# Patient Record
Sex: MALE | Race: WHITE | NOT HISPANIC OR LATINO | Employment: FULL TIME | URBAN - METROPOLITAN AREA
[De-identification: names, ages, dates, MRNs, and addresses within clinical notes are randomized per-mention and may not be internally consistent; named-entity substitution may affect disease eponyms.]

---

## 2017-01-25 ENCOUNTER — MYC REFILL (OUTPATIENT)
Dept: FAMILY MEDICINE | Facility: CLINIC | Age: 37
End: 2017-01-25

## 2017-01-25 DIAGNOSIS — B00.1 COLD SORE: ICD-10-CM

## 2017-01-25 DIAGNOSIS — F40.10 SOCIAL PHOBIA: ICD-10-CM

## 2017-01-25 RX ORDER — LORAZEPAM 0.5 MG/1
TABLET ORAL
Qty: 90 TABLET | Refills: 0 | Status: SHIPPED | OUTPATIENT
Start: 2017-01-25 | End: 2017-04-18

## 2017-01-25 RX ORDER — VALACYCLOVIR HYDROCHLORIDE 1 G/1
TABLET, FILM COATED ORAL
Qty: 28 TABLET | Refills: 2 | Status: SHIPPED | OUTPATIENT
Start: 2017-01-25 | End: 2017-07-05

## 2017-01-25 NOTE — TELEPHONE ENCOUNTER
Message from MyChart:  Original authorizing provider: JEREMIE Martin Randy Mindy would like a refill of the following medications:  valACYclovir (VALTREX) 1000 mg tablet [Jackelyn Mahajan PA-C]  LORazepam (ATIVAN) 0.5 MG tablet [Jackelyn Mahajan PA-C]    Preferred pharmacy: [facility] [address]    Comment:        Last Written Prescription Date: 01/20/16-valacyclovir, 10/24/16-lorazepam  Last Fill Quantity: 28,90,  # refills: 2,0   Last Office Visit with [facility], [facility] or [facility] prescribing provider: 07/07/16                                         Next 5 appointments (look out 90 days)     Jan 31, 2017  1:30 PM   Office Visit with Jackelyn Mahajan PA-C   [facility]    [address] [place] [id number] [phone]

## 2017-01-31 ENCOUNTER — OFFICE VISIT (OUTPATIENT)
Dept: FAMILY MEDICINE | Facility: CLINIC | Age: 37
End: 2017-01-31
Payer: COMMERCIAL

## 2017-01-31 VITALS
WEIGHT: 224 LBS | DIASTOLIC BLOOD PRESSURE: 82 MMHG | BODY MASS INDEX: 28.57 KG/M2 | RESPIRATION RATE: 16 BRPM | TEMPERATURE: 97.6 F | SYSTOLIC BLOOD PRESSURE: 122 MMHG | HEART RATE: 88 BPM

## 2017-01-31 DIAGNOSIS — F40.10 SOCIAL PHOBIA: ICD-10-CM

## 2017-01-31 DIAGNOSIS — R79.89 LOW TSH LEVEL: ICD-10-CM

## 2017-01-31 DIAGNOSIS — G47.9 SLEEP DISORDER: ICD-10-CM

## 2017-01-31 DIAGNOSIS — F90.0 ADD (ATTENTION DEFICIT HYPERACTIVITY DISORDER, INATTENTIVE TYPE): Primary | ICD-10-CM

## 2017-01-31 DIAGNOSIS — F32.5 MAJOR DEPRESSION IN COMPLETE REMISSION (H): ICD-10-CM

## 2017-01-31 DIAGNOSIS — R61 GENERALIZED HYPERHIDROSIS: ICD-10-CM

## 2017-01-31 LAB — TSH SERPL DL<=0.005 MIU/L-ACNC: 0.47 MU/L (ref 0.4–4)

## 2017-01-31 PROCEDURE — 99213 OFFICE O/P EST LOW 20 MIN: CPT | Performed by: PHYSICIAN ASSISTANT

## 2017-01-31 PROCEDURE — 36415 COLL VENOUS BLD VENIPUNCTURE: CPT | Performed by: PHYSICIAN ASSISTANT

## 2017-01-31 PROCEDURE — 84443 ASSAY THYROID STIM HORMONE: CPT | Performed by: PHYSICIAN ASSISTANT

## 2017-01-31 RX ORDER — DEXTROAMPHETAMINE SACCHARATE, AMPHETAMINE ASPARTATE MONOHYDRATE, DEXTROAMPHETAMINE SULFATE AND AMPHETAMINE SULFATE 5; 5; 5; 5 MG/1; MG/1; MG/1; MG/1
20 CAPSULE, EXTENDED RELEASE ORAL DAILY
Qty: 30 CAPSULE | Refills: 0 | Status: SHIPPED | OUTPATIENT
Start: 2017-03-28 | End: 2017-06-16

## 2017-01-31 RX ORDER — DEXTROAMPHETAMINE SACCHARATE, AMPHETAMINE ASPARTATE MONOHYDRATE, DEXTROAMPHETAMINE SULFATE AND AMPHETAMINE SULFATE 5; 5; 5; 5 MG/1; MG/1; MG/1; MG/1
20 CAPSULE, EXTENDED RELEASE ORAL DAILY
Qty: 30 CAPSULE | Refills: 0 | Status: SHIPPED | OUTPATIENT
Start: 2017-02-28 | End: 2017-01-31

## 2017-01-31 RX ORDER — VENLAFAXINE HYDROCHLORIDE 75 MG/1
225 CAPSULE, EXTENDED RELEASE ORAL DAILY
Qty: 270 CAPSULE | Refills: 3 | Status: SHIPPED | OUTPATIENT
Start: 2017-01-31 | End: 2017-07-05

## 2017-01-31 RX ORDER — DEXTROAMPHETAMINE SACCHARATE, AMPHETAMINE ASPARTATE MONOHYDRATE, DEXTROAMPHETAMINE SULFATE AND AMPHETAMINE SULFATE 5; 5; 5; 5 MG/1; MG/1; MG/1; MG/1
20 CAPSULE, EXTENDED RELEASE ORAL DAILY
Qty: 30 CAPSULE | Refills: 0 | Status: SHIPPED | OUTPATIENT
Start: 2017-01-31 | End: 2017-01-31

## 2017-01-31 RX ORDER — TRAZODONE HYDROCHLORIDE 100 MG/1
100 TABLET ORAL AT BEDTIME
Qty: 90 TABLET | Refills: 3 | Status: SHIPPED | OUTPATIENT
Start: 2017-01-31 | End: 2017-07-05

## 2017-01-31 ASSESSMENT — PAIN SCALES - GENERAL: PAINLEVEL: NO PAIN (0)

## 2017-01-31 NOTE — NURSING NOTE
"Chief Complaint   Patient presents with     A.D.H.D     med check       Initial /82 mmHg  Pulse 88  Temp(Src) 97.6  F (36.4  C) (Tympanic)  Resp 16  Wt 224 lb (101.606 kg) Estimated body mass index is 28.57 kg/(m^2) as calculated from the following:    Height as of 7/7/16: 6' 2.25\" (1.886 m).    Weight as of this encounter: 224 lb (101.606 kg).  BP completed using cuff size: kyara Hernandez CMA (AAMA)   "

## 2017-01-31 NOTE — PROGRESS NOTES
SUBJECTIVE:                                                    Roberto Guillen is a 37 year old male who presents to clinic today for the following health issues:      Medication Followup of Adderall    Taking Medication as prescribed: yes    Side Effects:  None    Medication Helping Symptoms:  Patient would like to go back to the extended release cap -had been on 20 milligrams extended release.          Depression and Anxiety Follow-Up    Status since last visit: Doing very well-no concerns. Currently on Effexor 225 mg daily. Also uses Ativan sparingly for anxiety and for diagnosis of social phobia-recently had this refilled.  Would like to continue same dose of medication.      Other associated symptoms:None    Complicating factors:     Significant life event: No     Current substance abuse: None    PHQ-9 SCORE 5/28/2015 7/7/2016 1/31/2017   Total Score 5 - -   Total Score - 3 1     No flowsheet data found.     PHQ-9  English      PHQ-9   Any Language     GAD7       SLEEP DISORDER-  Using trazodone 100 mg at bedtime-sleep has improved dramatically since the use of this medication.    LOW TSH-  His worsening anxiety had run a TSH which was just slightly under the normal at 0.36. Have suggested we recheck that in a month and he had forgotten to come in. Denies any significant endocrinology issues with review of systems today. We'll recheck this today.    Problem list and histories reviewed & adjusted, as indicated.  Additional history: as documented    Problem list, Medication list, Allergies, and Medical/Social/Surgical histories reviewed in [facility] and updated as appropriate.    ROS:  Constitutional, HEENT, cardiovascular, pulmonary, gi and gu systems are negative, except as otherwise noted.    OBJECTIVE:                                                    /82 mmHg  Pulse 88  Temp(Src) 97.6  F (36.4  C) (Tympanic)  Resp 16  Wt 224 lb (101.606 kg)  Body mass index is 28.57 kg/(m^2).   GENERAL:  healthy, alert and no distress  No signs of self harming behaviours. Normal hygiene & dress. Eye contact normal. Speech/mentation normal.      Diagnostic Test Results:  TSH is pending.      ASSESSMENT:                                                       ADD (attention deficit hyperactivity disorder, inattentive type)  Sleep disorder  Social phobia  Major depression in complete remission (H)  Generalized hyperhidrosis  Low TSH level      PLAN:                                                        ICD-10-CM    1. ADD (attention deficit hyperactivity disorder, inattentive type) F90.0 amphetamine-dextroamphetamine (ADDERALL XR) 20 MG per 24 hr capsule     DISCONTINUED: amphetamine-dextroamphetamine (ADDERALL XR) 20 MG per 24 hr capsule     DISCONTINUED: amphetamine-dextroamphetamine (ADDERALL XR) 20 MG per 24 hr capsule   2. Sleep disorder G47.9 traZODone (DESYREL) 100 MG tablet     venlafaxine (EFFEXOR-XR) 75 MG 24 hr capsule   3. Social phobia F40.10 traZODone (DESYREL) 100 MG tablet     venlafaxine (EFFEXOR-XR) 75 MG 24 hr capsule   4. Major depression in complete remission (H) F32.5 traZODone (DESYREL) 100 MG tablet     venlafaxine (EFFEXOR-XR) 75 MG 24 hr capsule   5. Generalized hyperhidrosis R61 traZODone (DESYREL) 100 MG tablet     venlafaxine (EFFEXOR-XR) 75 MG 24 hr capsule   6. Low TSH level R94.6 traZODone (DESYREL) 100 MG tablet     venlafaxine (EFFEXOR-XR) 75 MG 24 hr capsule     TSH with free T4 reflex           Changed his current Adderall prescription from generic twice daily dosing to extended release 20 mg daily. 3 separate months of prescriptions written out including January, February, March. He will take these to his pharmacy. Refilled Effexor and trazodone as stable on these medications. Follow-up in 6 months, sooner if any concerns or worsening.    Jackelyn Mahajan PA-C  [facility]    GREATER THAN 50% OF TIME SPENT IN COUNSELING & CARE COORDINATION - TOTAL FACE TO FACE TIME   15 MINUTES.    Orders Placed This Encounter     TSH with free T4 reflex     DISCONTD: amphetamine-dextroamphetamine (ADDERALL XR) 20 MG per 24 hr capsule     traZODone (DESYREL) 100 MG tablet     venlafaxine (EFFEXOR-XR) 75 MG 24 hr capsule     DISCONTD: amphetamine-dextroamphetamine (ADDERALL XR) 20 MG per 24 hr capsule     amphetamine-dextroamphetamine (ADDERALL XR) 20 MG per 24 hr capsule       Chart documentation done in part with Dragon Voice recognition Software. Although reviewed after completion, some word and grammatical error may remain.  AVS given to patient upon discharge today.  Electronically signed by Jackelyn Mahajan PA-C  January 31, 2017  2:13 PM

## 2017-01-31 NOTE — PROGRESS NOTES
Quick Note:    Roberto - your TSH is normal, but on the lower side. I would like to keep a close eye on this - next time you come in we should recheck it.  ______

## 2017-02-01 ASSESSMENT — PATIENT HEALTH QUESTIONNAIRE - PHQ9: SUM OF ALL RESPONSES TO PHQ QUESTIONS 1-9: 1

## 2017-02-14 ENCOUNTER — TELEPHONE (OUTPATIENT)
Dept: FAMILY MEDICINE | Facility: CLINIC | Age: 37
End: 2017-02-14

## 2017-02-14 DIAGNOSIS — J32.9 SINUS INFECTION: Primary | ICD-10-CM

## 2017-02-14 NOTE — TELEPHONE ENCOUNTER
RN Sinusitis Treatment Protocol: ages 16 and up  Roberto Guillen, a 37 year old male, is having symptoms reviewed for possible sinus infection.    ASSESSMENT/PLAN:  1.  Antibiotic treatment is indicated as onset of symptoms have been more than 10 days.  Amoxicillin-clavulanate 875mg/125mg orally twice daily or 500mg/125mg three times daily for 5 to 7 days.  2.  Education: ibuprofen or acetaminophen as directed on the bottle, over the counter decongestant, Nasal saline rinse, (Neti-pot or a nasal saline spray is preferred to Afrin nasal spray), Afrin nasal spray no longer than 5 days.  3.  Follow-up: Contact provider's triage RN if symptoms do not improve after 3 days of antibiotic treatment or if symptoms return after antibiotic therapy is complete.   4.  Patient verbalized understanding of this plan and is agreeable.    SUBJECTIVE:  Symptoms: Facial pain or pressure over the sinus areas, especially if worse with position change or cough, Nasal discharge/purulent, Nasal congestion, Change in sense of smell or lack of smell and Post nasal drip.     In addition notes: None   Shortness of breath: NO  Onset of symptoms was 4 week(s) ago.    Treatment measures tried include Fluids, OTC meds, Rest and Vaporizer.   Course of illness is same.  Predisposing conditions include: None    Complicating Factors and Serious Symptoms:   Patient reports: NONE   Patient denies: Fever > 102.5  Orbital pain  Periorbital swelling or erythema  Severe facial swelling or erythema  Severe neck pain  This being the worse headache the patient has ever experienced  Vision changes  COPD (chronic obstructive pulmonary disease)    ALLERGIES:  No Known Allergies  OBJECTIVE:  Weight: 0 lbs 0 oz    Encounter handled by: Nurse Triage.     Josey Miller RN

## 2017-04-18 ENCOUNTER — MYC REFILL (OUTPATIENT)
Dept: FAMILY MEDICINE | Facility: CLINIC | Age: 37
End: 2017-04-18

## 2017-04-18 DIAGNOSIS — F40.10 SOCIAL PHOBIA: ICD-10-CM

## 2017-04-18 RX ORDER — LORAZEPAM 0.5 MG/1
TABLET ORAL
Qty: 90 TABLET | Refills: 0 | Status: SHIPPED | OUTPATIENT
Start: 2017-04-18 | End: 2017-07-05

## 2017-04-18 NOTE — TELEPHONE ENCOUNTER
Message from MyChart:  Original authorizing provider: JEREMIE Martin would like a refill of the following medications:  LORazepam (ATIVAN) 0.5 MG tablet [Jackelyn Mahajan PA-C]    Preferred pharmacy: [address]    Comment:        Last Written Prescription Date: 01/25/17  Last Fill Quantity: 90,  # refills: 0   Last Office Visit with [facility], [facility] or [facility] prescribing provider: 01/31/17

## 2017-06-16 ENCOUNTER — MYC REFILL (OUTPATIENT)
Dept: FAMILY MEDICINE | Facility: CLINIC | Age: 37
End: 2017-06-16

## 2017-06-16 DIAGNOSIS — F90.0 ADD (ATTENTION DEFICIT HYPERACTIVITY DISORDER, INATTENTIVE TYPE): ICD-10-CM

## 2017-06-16 NOTE — TELEPHONE ENCOUNTER
Message from [employer]:  Original authorizing provider: JEREMIE Martin would like a refill of the following medications:  amphetamine-dextroamphetamine (ADDERALL XR) 20 MG per 24 hr capsule [Jackelyn Mahajan PA-C]    Preferred pharmacy: [facility] [address]    Comment:  I can  the script whenever it's ready        Last Written Prescription Date: 03/28/17  Last Fill Quantity: 30,  # refills: 0   Last Office Visit with [facility], [facility] or [facility] prescribing provider: 01/31/17

## 2017-06-19 RX ORDER — DEXTROAMPHETAMINE SACCHARATE, AMPHETAMINE ASPARTATE MONOHYDRATE, DEXTROAMPHETAMINE SULFATE AND AMPHETAMINE SULFATE 5; 5; 5; 5 MG/1; MG/1; MG/1; MG/1
20 CAPSULE, EXTENDED RELEASE ORAL DAILY
Qty: 30 CAPSULE | Refills: 0 | Status: SHIPPED | OUTPATIENT
Start: 2017-06-19 | End: 2017-07-05

## 2017-07-05 ENCOUNTER — OFFICE VISIT (OUTPATIENT)
Dept: FAMILY MEDICINE | Facility: CLINIC | Age: 37
End: 2017-07-05
Payer: COMMERCIAL

## 2017-07-05 VITALS
HEIGHT: 75 IN | DIASTOLIC BLOOD PRESSURE: 88 MMHG | SYSTOLIC BLOOD PRESSURE: 138 MMHG | HEART RATE: 100 BPM | BODY MASS INDEX: 26.73 KG/M2 | TEMPERATURE: 98.2 F | RESPIRATION RATE: 18 BRPM | WEIGHT: 215 LBS | OXYGEN SATURATION: 96 %

## 2017-07-05 DIAGNOSIS — R79.89 LOW TSH LEVEL: ICD-10-CM

## 2017-07-05 DIAGNOSIS — Z13.6 CARDIOVASCULAR SCREENING; LDL GOAL LESS THAN 160: ICD-10-CM

## 2017-07-05 DIAGNOSIS — G47.9 SLEEP DISORDER: ICD-10-CM

## 2017-07-05 DIAGNOSIS — R61 GENERALIZED HYPERHIDROSIS: ICD-10-CM

## 2017-07-05 DIAGNOSIS — F32.5 MAJOR DEPRESSION IN COMPLETE REMISSION (H): ICD-10-CM

## 2017-07-05 DIAGNOSIS — Z00.00 ENCOUNTER FOR GENERAL ADULT MEDICAL EXAMINATION WITHOUT ABNORMAL FINDINGS: Primary | ICD-10-CM

## 2017-07-05 DIAGNOSIS — F33.0 MILD RECURRENT MAJOR DEPRESSION (H): ICD-10-CM

## 2017-07-05 DIAGNOSIS — F40.10 SOCIAL PHOBIA: ICD-10-CM

## 2017-07-05 DIAGNOSIS — B00.1 COLD SORE: ICD-10-CM

## 2017-07-05 DIAGNOSIS — F98.8 ATTENTION DEFICIT DISORDER, UNSPECIFIED HYPERACTIVITY PRESENCE: ICD-10-CM

## 2017-07-05 PROBLEM — Z86.19 HISTORY OF COLD SORES: Status: ACTIVE | Noted: 2017-07-05

## 2017-07-05 LAB — TSH SERPL DL<=0.005 MIU/L-ACNC: 0.84 MU/L (ref 0.4–4)

## 2017-07-05 PROCEDURE — 36415 COLL VENOUS BLD VENIPUNCTURE: CPT | Performed by: PHYSICIAN ASSISTANT

## 2017-07-05 PROCEDURE — 99395 PREV VISIT EST AGE 18-39: CPT | Performed by: PHYSICIAN ASSISTANT

## 2017-07-05 PROCEDURE — 84443 ASSAY THYROID STIM HORMONE: CPT | Performed by: PHYSICIAN ASSISTANT

## 2017-07-05 RX ORDER — TRAZODONE HYDROCHLORIDE 100 MG/1
100 TABLET ORAL AT BEDTIME
Qty: 90 TABLET | Refills: 3 | Status: SHIPPED | OUTPATIENT
Start: 2017-07-05 | End: 2018-01-30

## 2017-07-05 RX ORDER — DEXTROAMPHETAMINE SACCHARATE, AMPHETAMINE ASPARTATE MONOHYDRATE, DEXTROAMPHETAMINE SULFATE AND AMPHETAMINE SULFATE 5; 5; 5; 5 MG/1; MG/1; MG/1; MG/1
20 CAPSULE, EXTENDED RELEASE ORAL DAILY
Qty: 30 CAPSULE | Refills: 0 | Status: SHIPPED | OUTPATIENT
Start: 2017-07-17 | End: 2017-08-16

## 2017-07-05 RX ORDER — VALACYCLOVIR HYDROCHLORIDE 1 G/1
TABLET, FILM COATED ORAL
Qty: 28 TABLET | Refills: 2 | Status: SHIPPED | OUTPATIENT
Start: 2017-07-05 | End: 2018-08-09

## 2017-07-05 RX ORDER — LORAZEPAM 0.5 MG/1
TABLET ORAL
Qty: 90 TABLET | Refills: 0 | Status: SHIPPED | OUTPATIENT
Start: 2017-07-05 | End: 2017-11-08

## 2017-07-05 RX ORDER — VENLAFAXINE HYDROCHLORIDE 75 MG/1
225 CAPSULE, EXTENDED RELEASE ORAL DAILY
Qty: 270 CAPSULE | Refills: 3 | Status: SHIPPED | OUTPATIENT
Start: 2017-07-05 | End: 2018-01-30

## 2017-07-05 ASSESSMENT — PAIN SCALES - GENERAL: PAINLEVEL: NO PAIN (0)

## 2017-07-05 NOTE — MR AVS SNAPSHOT
After Visit Summary   7/5/2017    Roberto Guillen    MRN: [id number]           Patient Information     Date Of Birth          1980        Visit Information        Provider Department      7/5/2017 1:30 PM Jackelyn Mahajan PA-C [facility]        Today's Diagnoses     Social phobia    -  1    Mild recurrent major depression (H)        Sleep disorder        Major depression in complete remission (H)        Generalized hyperhidrosis        Low TSH level        Cold sore        Attention deficit disorder, unspecified hyperactivity presence          Care Instructions      Preventive Health Recommendations  Male Ages 26 - 39    Yearly exam:             See your health care provider every year in order to  o   Review health changes.   o   Discuss preventive care.    o   Review your medicines if your doctor has prescribed any.    You should be tested each year for STDs (sexually transmitted diseases), if you re at risk.     After age 35, talk to your provider about cholesterol testing. If you are at risk for heart disease, have your cholesterol tested at least every 5 years.     If you are at risk for diabetes, you should have a diabetes test (fasting glucose).  Shots: Get a flu shot each year. Get a tetanus shot every 10 years.     Nutrition:    Eat at least 5 servings of fruits and vegetables daily.     Eat whole-grain bread, whole-wheat pasta and brown rice instead of white grains and rice.     Talk to your provider about Calcium and Vitamin D.     Lifestyle    Exercise for at least 150 minutes a week (30 minutes a day, 5 days a week). This will help you control your weight and prevent disease.     Limit alcohol to one drink per day.     No smoking.     Wear sunscreen to prevent skin cancer.     See your dentist every six months for an exam and cleaning.             Follow-ups after your visit        Who to contact     If you have questions or need follow up information about  "today's clinic visit or your schedule please contact [facility] directly at [phone].  Normal or non-critical lab and imaging results will be communicated to you by MyChart, letter or phone within 4 business days after the clinic has received the results. If you do not hear from us within 7 days, please contact the clinic through [employer] or phone. If you have a critical or abnormal lab result, we will notify you by phone as soon as possible.  Submit refill requests through [employer] or call your pharmacy and they will forward the refill request to us. Please allow 3 business days for your refill to be completed.          Additional Information About Your Visit        [employer] Information     [employer] gives you secure access to your electronic health record. If you see a primary care provider, you can also send messages to your care team and make appointments. If you have questions, please call your primary care clinic.  If you do not have a primary care provider, please call [phone] and they will assist you.        Care EveryWhere ID     This is your Care EveryWhere ID. This could be used by other organizations to access your [place] medical records  [id number]        Your Vitals Were     Pulse Temperature Respirations Height Pulse Oximetry BMI (Body Mass Index)    100 98.2  F (36.8  C) (Tympanic) 18 6' 3.4\" (1.915 m) 96% 26.59 kg/m2       Blood Pressure from Last 3 Encounters:   07/05/17 (!) 142/100   01/31/17 122/82   07/07/16 128/80    Weight from Last 3 Encounters:   07/05/17 215 lb (97.5 kg)   01/31/17 224 lb (101.6 kg)   07/07/16 211 lb (95.7 kg)              We Performed the Following     DEPRESSION ACTION PLAN (DAP)     TSH with free T4 reflex          Where to get your medicines      These medications were sent to [facility] [address] [address]     Phone:  [phone]     traZODone 100 MG tablet    " valACYclovir 1000 mg tablet    venlafaxine 75 MG 24 hr capsule         Some of these will need a paper prescription and others can be bought over the counter.  Ask your nurse if you have questions.     Bring a paper prescription for each of these medications     amphetamine-dextroamphetamine 20 MG per 24 hr capsule    LORazepam 0.5 MG tablet          Primary Care Provider Office Phone # Fax #    Jackelyn S JEREMIE Mahajan [phone]       [address] [place] [address]        Equal Access to Services     ANNETTE BARRETT : Hadii aad ku hadasho Soomaali, waaxda luqadaha, qaybta kaalmada adeegyada, waxay idiin hayaan adeeg kholivia lui . So [facility] [phone].    ATENCIÓN: Si habla español, tiene a otto disposición servicios gratuitos de asistencia lingüística. [facility] [phone].    We comply with applicable federal civil rights laws and [place] laws. We do not discriminate on the basis of race, color, national origin, age, disability sex, sexual orientation or gender identity.            Thank you!     Thank you for choosing [facility]  for your care. Our goal is always to provide you with excellent care. Hearing back from our patients is one way we can continue to improve our services. Please take a few minutes to complete the written survey that you may receive in the mail after your visit with us. Thank you!             Your Updated Medication List - Protect others around you: Learn how to safely use, store and throw away your medicines at www.disposemymeds.org.          This list is accurate as of: 7/5/17  2:05 PM.  Always use your most recent med list.                   Brand Name Dispense Instructions for use Diagnosis    amphetamine-dextroamphetamine 20 MG per 24 hr capsule   Start taking on:  7/17/2017    ADDERALL XR    30 capsule    Take 1 capsule (20 mg) by mouth daily    Attention deficit disorder, unspecified hyperactivity presence        LORazepam 0.5 MG tablet    ATIVAN    90 tablet    ONE - TWO EVERY 6 HOURS, AS NEEDED FOR ANXIETY.    Social phobia       traZODone 100 MG tablet    DESYREL    90 tablet    Take 1 tablet (100 mg) by mouth At Bedtime for sleep    Sleep disorder, Social phobia, Major depression in complete remission (H), Generalized hyperhidrosis, Low TSH level       valACYclovir 1000 mg tablet    VALTREX    28 tablet    Take  by mouth. 2 Tabs bid x1 day for cold sore outbreak. 4 tablets for treatment.    Cold sore       venlafaxine 75 MG 24 hr capsule    EFFEXOR-XR    270 capsule    Take 3 capsules (225 mg) by mouth daily    Social phobia, Major depression in complete remission (H), Sleep disorder, Generalized hyperhidrosis, Low TSH level

## 2017-07-05 NOTE — PROGRESS NOTES
SUBJECTIVE:   CC: Roberto Guillen is an 37 year old male who presents for preventative health visit.     Healthy Habits:    Do you get at least three servings of calcium containing foods daily (dairy, green leafy vegetables, etc.)? yes    Amount of exercise or daily activities, outside of work: 5 day(s) per week    Problems taking medications regularly No    Medication side effects: No    Have you had an eye exam in the past two years? no    Do you see a dentist twice per year? yes    Do you have sleep apnea, excessive snoring or daytime drowsiness?no        Depression/Anxiety Followup    Status since last visit: Stable - has been on the same medication management long-term including Effexor to 25 mg daily and Ativan sparingly. Uses trazodone for sleep.    See PHQ-9 for current symptoms.  Other associated symptoms: None    Complicating factors:   Significant life event:  No   Current substance abuse:  None  Anxiety or Panic symptoms:  Yes-  Anxiety        PHQ-9  English  PHQ-9   Any Language    Recheck ADHD - has been on Adderall extended release 20 mg daily for the past few few years after being diagnosed by Dr. Izquierdo with ADD. Is here today also for 6 month follow-up.    Also needs follow-up of his thyroid-have been watching this carefully as he did have one low TSH level, but follow-ups have prove normal levels.    Today's PHQ-2 Score:   PHQ-2 ( 1999 Pfizer) 7/5/2017   Q1: Little interest or pleasure in doing things 0   Q2: Feeling down, depressed or hopeless 0   PHQ-2 Score 0       Abuse: Current or Past(Physical, Sexual or Emotional)- No  Do you feel safe in your environment - Yes    Social History   Substance Use Topics     Smoking status: Former Smoker     Packs/day: 0.50     Quit date: 1/1/2013     Smokeless tobacco: Never Used     Alcohol use No      Comment: treatment in 2006     The patient does not drink >3 drinks per day nor >7 drinks per week.    Last PSA: No results found for:  "PSA    Reviewed orders with patient. Reviewed health maintenance and updated orders accordingly - Yes  Patient Active Problem List   Diagnosis     Social phobia     Sleep disorder     CARDIOVASCULAR SCREENING; LDL GOAL LESS THAN 160     ADD (attention deficit hyperactivity disorder, inattentive type)     History of cold sores     Mild recurrent major depression (H)     Past Surgical History:   Procedure Laterality Date     TONSILLECTOMY  1990       Social History   Substance Use Topics     Smoking status: Former Smoker     Packs/day: 0.50     Quit date: 1/1/2013     Smokeless tobacco: Never Used     Alcohol use No      Comment: treatment in 2006     Family History   Problem Relation Age of Onset     Depression Sister      Depression Mother      DIABETES Mother      borderline     Hypertension Mother      Prostate Cancer Father 60     Lipids Father      Psychotic Disorder Maternal Uncle      suicide     Cancer - colorectal No family hx of            Reviewed and updated as needed this visit by clinical staff  Tobacco  Allergies  Meds         Reviewed and updated as needed this visit by Provider            ROS:  C: NEGATIVE for fever, chills, change in weight  I: NEGATIVE for worrisome rashes, moles or lesions  E: NEGATIVE for vision changes or irritation  ENT: NEGATIVE for ear, mouth and throat problems  R: NEGATIVE for significant cough or SOB  CV: NEGATIVE for chest pain, palpitations or peripheral edema  GI: NEGATIVE for nausea, abdominal pain, heartburn, or change in bowel habits   male: negative for dysuria, hematuria, decreased urinary stream, erectile dysfunction, urethral discharge  M: NEGATIVE for significant arthralgias or myalgia  N: NEGATIVE for weakness, dizziness or paresthesias  P: NEGATIVE for changes in mood or affect    OBJECTIVE:   /88  Pulse 100  Temp 98.2  F (36.8  C) (Tympanic)  Resp 18  Ht 6' 3.4\" (1.915 m)  Wt 215 lb (97.5 kg)  SpO2 96%  BMI 26.59 kg/m2  EXAM:  GENERAL: " healthy, alert and no distress  EYES: Eyes grossly normal to inspection, PERRL and conjunctivae and sclerae normal  HENT: ear canals and TM's normal, nose and mouth without ulcers or lesions  NECK: no adenopathy, no asymmetry, masses, or scars and thyroid normal to palpation  RESP: lungs clear to auscultation - no rales, rhonchi or wheezes  CV: regular rate and rhythm, normal S1 S2, no S3 or S4, no murmur, click or rub, no peripheral edema and peripheral pulses strong  ABDOMEN: soft, nontender, no hepatosplenomegaly, no masses and bowel sounds normal   (male): declined  MS: no gross musculoskeletal defects noted, no edema  SKIN: no suspicious lesions or rashes  NEURO: Normal strength and tone, mentation intact and speech normal  PSYCH: mentation appears normal, affect normal/bright    ASSESSMENT/PLAN:       ICD-10-CM    1. Encounter for general adult medical examination without abnormal findings Z00.00    2. Social phobia F40.10 LORazepam (ATIVAN) 0.5 MG tablet     traZODone (DESYREL) 100 MG tablet     venlafaxine (EFFEXOR-XR) 75 MG 24 hr capsule   3. Mild recurrent major depression (H) F33.0    4. Sleep disorder G47.9 traZODone (DESYREL) 100 MG tablet     venlafaxine (EFFEXOR-XR) 75 MG 24 hr capsule   5. Major depression in complete remission (H) F32.5 traZODone (DESYREL) 100 MG tablet     venlafaxine (EFFEXOR-XR) 75 MG 24 hr capsule   6. Generalized hyperhidrosis R61 traZODone (DESYREL) 100 MG tablet     venlafaxine (EFFEXOR-XR) 75 MG 24 hr capsule   7. Low TSH level R94.6 traZODone (DESYREL) 100 MG tablet     venlafaxine (EFFEXOR-XR) 75 MG 24 hr capsule     TSH with free T4 reflex   8. Cold sore-history of B00.1 valACYclovir (VALTREX) 1000 mg tablet   9. Attention deficit disorder, unspecified hyperactivity presence F98.8 amphetamine-dextroamphetamine (ADDERALL XR) 20 MG per 24 hr capsule   10. CARDIOVASCULAR SCREENING; LDL GOAL LESS THAN 160 Z13.6 Lipid Profile (Chol, Trig, HDL, LDL calc)  "      COUNSELING:  Reviewed preventive health counseling, as reflected in patient instructions       Regular exercise       Healthy diet/nutrition       Vision screening    BP Screening:   Last 3 BP Readings:    BP Readings from Last 3 Encounters:   07/05/17 138/88   01/31/17 122/82   07/07/16 128/80       The following was recommended to the patient:  Re-screen BP within a year and recommended lifestyle modifications     reports that he quit smoking about 4 years ago. He smoked 0.50 packs per day. He has never used smokeless tobacco.    Estimated body mass index is 26.59 kg/(m^2) as calculated from the following:    Height as of this encounter: 6' 3.4\" (1.915 m).    Weight as of this encounter: 215 lb (97.5 kg).     Refilled all medications-she has been on these long-term and is stable. We'll see him back in 6 months for follow-up of the ADD and refill of the medication or this diagnoses. TSH and lipid panel were done today-we'll call him with results as they return. His blood pressure was slightly elevated at first check, but did come down to a normal level at the end of the visit. Still high-normal. Would like him to continue monitoring this carefully. 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 He will keep me informed particularly if elevating.      Counseling Resources:  ATP IV Guidelines  Pooled Cohorts Equation Calculator  FRAX Risk Assessment  ICSI Preventive Guidelines  Dietary Guidelines for Americans, 2010  USDA's MyPlate  ASA Prophylaxis  Lung CA Screening    Jackelyn Mahajan PA-C  [facility] Placed This Encounter     DEPRESSION ACTION PLAN (DAP)     TSH with free T4 reflex     Lipid Profile (Chol, Trig, HDL, LDL calc)     LORazepam (ATIVAN) 0.5 MG tablet     traZODone (DESYREL) 100 MG tablet     venlafaxine (EFFEXOR-XR) 75 MG 24 hr capsule     valACYclovir (VALTREX) 1000 mg tablet     amphetamine-dextroamphetamine (ADDERALL XR) 20 MG per 24 hr " capsule       AVS given to patient upon discharge today.  Electronically signed by Jackelyn Mahajan PA-C  July 12, 2017  10:36 AM

## 2017-07-05 NOTE — PROGRESS NOTES
Roberto - your lab looks fine - no concern - I'd like to continue checking this yearly with your physicals.

## 2017-07-05 NOTE — NURSING NOTE
"Chief Complaint   Patient presents with     Physical       Initial BP (!) 142/100  Pulse 100  Temp 98.2  F (36.8  C) (Tympanic)  Resp 18  Ht 6' 3.4\" (1.915 m)  Wt 215 lb (97.5 kg)  SpO2 96%  BMI 26.59 kg/m2 Estimated body mass index is 26.59 kg/(m^2) as calculated from the following:    Height as of this encounter: 6' 3.4\" (1.915 m).    Weight as of this encounter: 215 lb (97.5 kg).  BP completed using cuff size: carly Almaraz MA      "

## 2017-07-05 NOTE — LETTER
My Depression Action Plan  Name: Roberto Guillen   Date of Birth 1980  Date: 7/5/2017    My doctor: Jackelyn Mahajan   My clinic: [address] [address] [phone]          GREEN    ZONE   Good Control    What it looks like:     Things are going generally well. You have normal up s and down s. You may even feel depressed from time to time, but bad moods usually last less than a day.   What you need to do:  1. Continue to care for yourself (see self care plan)  2. Check your depression survival kit and update it as needed  3. Follow your physician s recommendations including any medication.  4. Do not stop taking medication unless you consult with your physician first.           YELLOW         ZONE Getting Worse    What it looks like:     Depression is starting to interfere with your life.     It may be hard to get out of bed; you may be starting to isolate yourself from others.    Symptoms of depression are starting to last most all day and this has happened for several days.     You may have suicidal thoughts but they are not constant.   What you need to do:     1. Call your care team, your response to treatment will improve if you keep your care team informed of your progress. Yellow periods are signs an adjustment may need to be made.     2. Continue your self-care, even if you have to fake it!    3. Talk to someone in your support network    4. Open up your depression survival kit           RED    ZONE Medical Alert - Get Help    What it looks like:     Depression is seriously interfering with your life.     You may experience these or other symptoms: You can t get out of bed most days, can t work or engage in other necessary activities, you have trouble taking care of basic hygiene, or basic responsibilities, thoughts of suicide or death that will not go away, self-injurious behavior.     What you need to do:  1. Call your care team  and request a same-day appointment. If they are not available (weekends or after hours) call your local crisis line, emergency room or 911.      Electronically signed by: Suyapa Almaraz, July 5, 2017    Depression Self Care Plan / Survival Kit    Self-Care for Depression  Here s the deal. Your body and mind are really not as separate as most people think.  What you do and think affects how you feel and how you feel influences what you do and think. This means if you do things that people who feel good do, it will help you feel better.  Sometimes this is all it takes.  There is also a place for medication and therapy depending on how severe your depression is, so be sure to consult with your medical provider and/ or Behavioral Health Consultant if your symptoms are worsening or not improving.     In order to better manage my stress, I will:    Exercise  Get some form of exercise, every day. This will help reduce pain and release endorphins, the  feel good  chemicals in your brain. This is almost as good as taking antidepressants!  This is not the same as joining a gym and then never going! (they count on that by the way ) It can be as simple as just going for a walk or doing some gardening, anything that will get you moving.      Hygiene   Maintain good hygiene (Get out of bed in the morning, Make your bed, Brush your teeth, Take a shower, and Get dressed like you were going to work, even if you are unemployed).  If your clothes don't fit try to get ones that do.    Diet  I will strive to eat foods that are good for me, drink plenty of water, and avoid excessive sugar, caffeine, alcohol, and other mood-altering substances.  Some foods that are helpful in depression are: complex carbohydrates, B vitamins, flaxseed, fish or fish oil, fresh fruits and vegetables.    Psychotherapy  I agree to participate in Individual Therapy (if recommended).    Medication  If prescribed medications, I agree to take them.  Missing doses  can result in serious side effects.  I understand that drinking alcohol, or other illicit drug use, may cause potential side effects.  I will not stop my medication abruptly without first discussing it with my provider.    Staying Connected With Others  I will stay in touch with my friends, family members, and my primary care provider/team.    Use your imagination  Be creative.  We all have a creative side; it doesn t matter if it s oil painting, sand castles, or mud pies! This will also kick up the endorphins.    Witness Beauty  (AKA stop and smell the roses) Take a look outside, even in mid-winter. Notice colors, textures. Watch the squirrels and birds.     Service to others  Be of service to others.  There is always someone else in need.  By helping others we can  get out of ourselves  and remember the really important things.  This also provides opportunities for practicing all the other parts of the program.    Humor  Laugh and be silly!  Adjust your TV habits for less news and crime-drama and more comedy.    Control your stress  Try breathing deep, massage therapy, biofeedback, and meditation. Find time to relax each day.     My support system    Clinic Contact:  Phone number:    Contact 1:  Phone number:    Contact 2:  Phone number:    Taoist/:  Phone number:    Therapist:  Phone number:    Local crisis center:    Phone number:    Other community support:  Phone number:

## 2017-07-06 ASSESSMENT — PATIENT HEALTH QUESTIONNAIRE - PHQ9: SUM OF ALL RESPONSES TO PHQ QUESTIONS 1-9: 2

## 2017-08-16 ENCOUNTER — MYC REFILL (OUTPATIENT)
Dept: FAMILY MEDICINE | Facility: CLINIC | Age: 37
End: 2017-08-16

## 2017-08-16 DIAGNOSIS — F98.8 ATTENTION DEFICIT DISORDER, UNSPECIFIED HYPERACTIVITY PRESENCE: ICD-10-CM

## 2017-08-16 RX ORDER — DEXTROAMPHETAMINE SACCHARATE, AMPHETAMINE ASPARTATE MONOHYDRATE, DEXTROAMPHETAMINE SULFATE AND AMPHETAMINE SULFATE 5; 5; 5; 5 MG/1; MG/1; MG/1; MG/1
20 CAPSULE, EXTENDED RELEASE ORAL DAILY
Qty: 30 CAPSULE | Refills: 0 | Status: SHIPPED | OUTPATIENT
Start: 2017-08-16 | End: 2017-10-23

## 2017-08-16 NOTE — TELEPHONE ENCOUNTER
Message from [employer]:  Original authorizing provider: JEREMIE Martin would like a refill of the following medications:  amphetamine-dextroamphetamine (ADDERALL XR) 20 MG per 24 hr capsule [Jackelyn Mahajan PA-C]    Preferred pharmacy: [facility] [address]    Comment:  You can give Rx to Bailee Narvaez if it's before the meeting today.. otherwise I can         Last Written Prescription Date: 07/17/17  Last Fill Quantity: 30,  # refills: 0   Last Office Visit with G, P or [facility] prescribing provider: 07/05/17

## 2017-08-16 NOTE — TELEPHONE ENCOUNTER
Hard copy Rx placed at  at [facility] to be picked up by patient.  ................Misha Friend LPN,   August 16, 2017,      1:02 PM,   [facility]

## 2017-10-23 ENCOUNTER — MYC REFILL (OUTPATIENT)
Dept: FAMILY MEDICINE | Facility: CLINIC | Age: 37
End: 2017-10-23

## 2017-10-23 DIAGNOSIS — F98.8 ATTENTION DEFICIT DISORDER, UNSPECIFIED HYPERACTIVITY PRESENCE: ICD-10-CM

## 2017-10-23 RX ORDER — DEXTROAMPHETAMINE SACCHARATE, AMPHETAMINE ASPARTATE MONOHYDRATE, DEXTROAMPHETAMINE SULFATE AND AMPHETAMINE SULFATE 5; 5; 5; 5 MG/1; MG/1; MG/1; MG/1
20 CAPSULE, EXTENDED RELEASE ORAL DAILY
Qty: 30 CAPSULE | Refills: 0 | Status: SHIPPED | OUTPATIENT
Start: 2017-11-20 | End: 2017-10-23

## 2017-10-23 RX ORDER — DEXTROAMPHETAMINE SACCHARATE, AMPHETAMINE ASPARTATE MONOHYDRATE, DEXTROAMPHETAMINE SULFATE AND AMPHETAMINE SULFATE 5; 5; 5; 5 MG/1; MG/1; MG/1; MG/1
20 CAPSULE, EXTENDED RELEASE ORAL DAILY
Qty: 30 CAPSULE | Refills: 0 | Status: SHIPPED | OUTPATIENT
Start: 2017-12-18 | End: 2018-01-30

## 2017-10-23 RX ORDER — DEXTROAMPHETAMINE SACCHARATE, AMPHETAMINE ASPARTATE MONOHYDRATE, DEXTROAMPHETAMINE SULFATE AND AMPHETAMINE SULFATE 5; 5; 5; 5 MG/1; MG/1; MG/1; MG/1
20 CAPSULE, EXTENDED RELEASE ORAL DAILY
Qty: 30 CAPSULE | Refills: 0 | Status: SHIPPED | OUTPATIENT
Start: 2017-10-23 | End: 2017-10-23

## 2017-10-23 NOTE — TELEPHONE ENCOUNTER
Message from [employer]:  Original authorizing provider: JEREMIE Martin would like a refill of the following medications:  amphetamine-dextroamphetamine (ADDERALL XR) 20 MG per 24 hr capsule [Jackelyn Mahajan PA-C]    Preferred pharmacy: Other - [facility] [place]    Comment:  Script can be mailed

## 2017-11-08 ENCOUNTER — MYC REFILL (OUTPATIENT)
Dept: FAMILY MEDICINE | Facility: CLINIC | Age: 37
End: 2017-11-08

## 2017-11-08 DIAGNOSIS — F40.10 SOCIAL PHOBIA: ICD-10-CM

## 2017-11-08 RX ORDER — LORAZEPAM 0.5 MG/1
TABLET ORAL
Qty: 90 TABLET | Refills: 0 | Status: SHIPPED | OUTPATIENT
Start: 2017-11-08 | End: 2018-01-30

## 2017-11-08 NOTE — TELEPHONE ENCOUNTER
Message from [employer]:  Original authorizing provider: JEREMIE Martin would like a refill of the following medications:  LORazepam (ATIVAN) 0.5 MG tablet [Jackelyn Mahajan PA-C]    Preferred pharmacy: [facility] [address]    Comment:

## 2018-01-30 ENCOUNTER — OFFICE VISIT (OUTPATIENT)
Dept: FAMILY MEDICINE | Facility: CLINIC | Age: 38
End: 2018-01-30
Payer: COMMERCIAL

## 2018-01-30 VITALS
HEIGHT: 75 IN | SYSTOLIC BLOOD PRESSURE: 138 MMHG | DIASTOLIC BLOOD PRESSURE: 98 MMHG | WEIGHT: 222 LBS | TEMPERATURE: 97.7 F | HEART RATE: 102 BPM | BODY MASS INDEX: 27.6 KG/M2 | RESPIRATION RATE: 16 BRPM | OXYGEN SATURATION: 96 %

## 2018-01-30 DIAGNOSIS — G47.9 SLEEP DISORDER: ICD-10-CM

## 2018-01-30 DIAGNOSIS — F98.8 ATTENTION DEFICIT DISORDER, UNSPECIFIED HYPERACTIVITY PRESENCE: ICD-10-CM

## 2018-01-30 DIAGNOSIS — F40.10 SOCIAL PHOBIA: ICD-10-CM

## 2018-01-30 DIAGNOSIS — R03.0 ELEVATED BLOOD PRESSURE READING WITHOUT DIAGNOSIS OF HYPERTENSION: ICD-10-CM

## 2018-01-30 DIAGNOSIS — F33.0 MILD RECURRENT MAJOR DEPRESSION (H): Primary | ICD-10-CM

## 2018-01-30 PROCEDURE — 99213 OFFICE O/P EST LOW 20 MIN: CPT | Performed by: PHYSICIAN ASSISTANT

## 2018-01-30 RX ORDER — TRAZODONE HYDROCHLORIDE 100 MG/1
100 TABLET ORAL AT BEDTIME
Qty: 90 TABLET | Refills: 3 | Status: SHIPPED | OUTPATIENT
Start: 2018-01-30 | End: 2018-08-09

## 2018-01-30 RX ORDER — DEXTROAMPHETAMINE SACCHARATE, AMPHETAMINE ASPARTATE MONOHYDRATE, DEXTROAMPHETAMINE SULFATE AND AMPHETAMINE SULFATE 5; 5; 5; 5 MG/1; MG/1; MG/1; MG/1
20 CAPSULE, EXTENDED RELEASE ORAL DAILY
Qty: 30 CAPSULE | Refills: 0 | Status: SHIPPED | OUTPATIENT
Start: 2018-03-27 | End: 2018-08-09

## 2018-01-30 RX ORDER — DEXTROAMPHETAMINE SACCHARATE, AMPHETAMINE ASPARTATE MONOHYDRATE, DEXTROAMPHETAMINE SULFATE AND AMPHETAMINE SULFATE 5; 5; 5; 5 MG/1; MG/1; MG/1; MG/1
20 CAPSULE, EXTENDED RELEASE ORAL DAILY
Qty: 30 CAPSULE | Refills: 0 | Status: SHIPPED | OUTPATIENT
Start: 2018-01-30 | End: 2018-01-30

## 2018-01-30 RX ORDER — DEXTROAMPHETAMINE SACCHARATE, AMPHETAMINE ASPARTATE MONOHYDRATE, DEXTROAMPHETAMINE SULFATE AND AMPHETAMINE SULFATE 5; 5; 5; 5 MG/1; MG/1; MG/1; MG/1
20 CAPSULE, EXTENDED RELEASE ORAL DAILY
Qty: 30 CAPSULE | Refills: 0 | Status: SHIPPED | OUTPATIENT
Start: 2018-02-27 | End: 2018-01-30

## 2018-01-30 RX ORDER — VENLAFAXINE HYDROCHLORIDE 75 MG/1
225 CAPSULE, EXTENDED RELEASE ORAL DAILY
Qty: 270 CAPSULE | Refills: 1 | Status: SHIPPED | OUTPATIENT
Start: 2018-01-30 | End: 2018-06-29

## 2018-01-30 RX ORDER — LORAZEPAM 0.5 MG/1
TABLET ORAL
Qty: 90 TABLET | Refills: 0 | Status: SHIPPED | OUTPATIENT
Start: 2018-01-31 | End: 2018-05-02

## 2018-01-30 RX ORDER — VENLAFAXINE 37.5 MG/1
TABLET ORAL
Qty: 60 TABLET | Refills: 0 | Status: SHIPPED | OUTPATIENT
Start: 2018-01-30 | End: 2022-01-12

## 2018-01-30 ASSESSMENT — PAIN SCALES - GENERAL: PAINLEVEL: NO PAIN (0)

## 2018-01-30 NOTE — MR AVS SNAPSHOT
After Visit Summary   1/30/2018    Roberto Guillen    MRN: [id number]           Patient Information     Date Of Birth          1980        Visit Information        Provider Department      1/30/2018 2:00 PM Jackelyn Mahajan PA-C [facility]        Today's Diagnoses     Mild recurrent major depression (H)    -  1    Social phobia        Attention deficit disorder, unspecified hyperactivity presence        Sleep disorder        Elevated blood pressure reading without diagnosis of hypertension           Follow-ups after your visit        Who to contact     If you have questions or need follow up information about today's clinic visit or your schedule please contact [facility] directly at [phone].  Normal or non-critical lab and imaging results will be communicated to you by MyChart, letter or phone within 4 business days after the clinic has received the results. If you do not hear from us within 7 days, please contact the clinic through [employer] or phone. If you have a critical or abnormal lab result, we will notify you by phone as soon as possible.  Submit refill requests through [employer] or call your pharmacy and they will forward the refill request to us. Please allow 3 business days for your refill to be completed.          Additional Information About Your Visit        MyChart Information     [employer] gives you secure access to your electronic health record. If you see a primary care provider, you can also send messages to your care team and make appointments. If you have questions, please call your primary care clinic.  If you do not have a primary care provider, please call [phone] and they will assist you.        Care EveryWhere ID     This is your Care EveryWhere ID. This could be used by other organizations to access your [place] medical records  [id number]        Your Vitals Were     Pulse Temperature Respirations Height Pulse Oximetry  "BMI (Body Mass Index)    102 97.7  F (36.5  C) (Tympanic) 16 6' 2.5\" (1.892 m) 96% 28.12 kg/m2       Blood Pressure from Last 3 Encounters:   01/30/18 (!) 138/98   07/05/17 138/88   01/31/17 122/82    Weight from Last 3 Encounters:   01/30/18 222 lb (100.7 kg)   07/05/17 215 lb (97.5 kg)   01/31/17 224 lb (101.6 kg)              Today, you had the following     No orders found for display         Today's Medication Changes          These changes are accurate as of 1/30/18 11:59 PM.  If you have any questions, ask your nurse or doctor.               Start taking these medicines.        Dose/Directions    amphetamine-dextroamphetamine 20 MG per 24 hr capsule   Commonly known as:  ADDERALL XR   Used for:  Attention deficit disorder, unspecified hyperactivity presence   Started by:  Jackelyn Mahajan PA-C        Dose:  20 mg   Start taking on:  3/27/2018   Take 1 capsule (20 mg) by mouth daily   Quantity:  30 capsule   Refills:  0         These medicines have changed or have updated prescriptions.        Dose/Directions    * venlafaxine 37.5 MG tablet   Commonly known as:  EFFEXOR   This may have changed:  You were already taking a medication with the same name, and this prescription was added. Make sure you understand how and when to take each.   Used for:  Mild recurrent major depression (H), Social phobia   Changed by:  Jackelyn Mahajan PA-C        One tablet to be taken if extended release Effexor is missed (to help alleviate side effects)   Quantity:  60 tablet   Refills:  0       * venlafaxine 75 MG 24 hr capsule   Commonly known as:  EFFEXOR-XR   This may have changed:  Another medication with the same name was added. Make sure you understand how and when to take each.   Used for:  Social phobia, Mild recurrent major depression (H)   Changed by:  Jackelyn Mahajan PA-C        Dose:  225 mg   Take 3 capsules (225 mg) by mouth daily   Quantity:  270 capsule   Refills:  1       * Notice:  This list has 2 " medication(s) that are the same as other medications prescribed for you. Read the directions carefully, and ask your doctor or other care provider to review them with you.         Where to get your medicines      These medications were sent to [facility] - [address]  [address] [place] [address]     Phone:  [phone]     traZODone 100 MG tablet    venlafaxine 37.5 MG tablet    venlafaxine 75 MG 24 hr capsule         Some of these will need a paper prescription and others can be bought over the counter.  Ask your nurse if you have questions.     Bring a paper prescription for each of these medications     amphetamine-dextroamphetamine 20 MG per 24 hr capsule    LORazepam 0.5 MG tablet                Primary Care Provider Office Phone # Fax #    Jackelyn Mahajan PA-C [phone] [address] [place] [address]        Equal Access to Services     [facility]: Hadii nighat ku hadasho Soomaali, waaxda luqadaha, qaybta kaalmada adeegyada, waxay monaein hayrustyn carlyle lui . So [facility] [phone].    ATENCIÓN: Si habla español, tiene a otto disposición servicios gratuitos de asistencia lingüística. Lldana al [phone].    We comply with applicable federal civil rights laws and [place] laws. We do not discriminate on the basis of race, color, national origin, age, disability, sex, sexual orientation, or gender identity.            Thank you!     Thank you for choosing [facility]  for your care. Our goal is always to provide you with excellent care. Hearing back from our patients is one way we can continue to improve our services. Please take a few minutes to complete the written survey that you may receive in the mail after your visit with us. Thank you!             Your Updated Medication List - Protect others around you: Learn how to safely use, store and throw away your medicines at www.disposemymeds.org.           This list is accurate as of 1/30/18 11:59 PM.  Always use your most recent med list.                   Brand Name Dispense Instructions for use Diagnosis    amphetamine-dextroamphetamine 20 MG per 24 hr capsule   Start taking on:  3/27/2018    ADDERALL XR    30 capsule    Take 1 capsule (20 mg) by mouth daily    Attention deficit disorder, unspecified hyperactivity presence       LORazepam 0.5 MG tablet    ATIVAN    90 tablet    ONE - TWO EVERY 6 HOURS, AS NEEDED FOR ANXIETY.    Social phobia       traZODone 100 MG tablet    DESYREL    90 tablet    Take 1 tablet (100 mg) by mouth At Bedtime for sleep    Sleep disorder, Social phobia       valACYclovir 1000 mg tablet    VALTREX    28 tablet    Take  by mouth. 2 Tabs bid x1 day for cold sore outbreak. 4 tablets for treatment.    Cold sore       * venlafaxine 37.5 MG tablet    EFFEXOR    60 tablet    One tablet to be taken if extended release Effexor is missed (to help alleviate side effects)    Mild recurrent major depression (H), Social phobia       * venlafaxine 75 MG 24 hr capsule    EFFEXOR-XR    270 capsule    Take 3 capsules (225 mg) by mouth daily    Social phobia, Mild recurrent major depression (H)       * Notice:  This list has 2 medication(s) that are the same as other medications prescribed for you. Read the directions carefully, and ask your doctor or other care provider to review them with you.

## 2018-01-30 NOTE — PROGRESS NOTES
SUBJECTIVE:   Roberto Guillen is a 38 year old male who presents to clinic today for the following health issues:      Depression/social phobia/sleep disturbance Followup  Been on Effexor 225 mg long-term with good effect.  This is an extended release version.  He states rarely but periodically he will forget to take his medication and feels horrible throughout the day.  Unfortunately he works in [place] and has quite a long commute from the [facility].  He is wondering about having some plain Effexor on hand in case he does forget to take his medication bridge and hopefully help alleviate some of those symptoms that he has.  States this happens maybe 1-2 times a month.  Continues to use trazodone 100 mg at bedtime with good success for sleep issues.  He is sleeping well and has no concerns with this medication.  Uses lorazepam 0.5 mg for social phobia-uses this sparingly and not on a daily basis.    Status since last visit: Stable     See PHQ-9 for current symptoms.  Other associated symptoms: None    Complicating factors:   Significant life event:  No   Current substance abuse:  None  Anxiety or Panic symptoms:  No    PHQ-9 1/31/2017 7/5/2017 1/30/2018   Total Score 1 2 2   Q9: Suicide Ideation Not at all Not at all Not at all       PHQ-9  English  PHQ-9   Any Language  Suicide Assessment Five-step Evaluation and Treatment (SAFE-T)      Recheck ADHD  Has been on Adderall extended release 20 mg daily long-term.  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    Amount of exercise or physical activity: 2-3 days/week for an average of 45-60 minutes    Problems taking medications regularly: No    Medication side effects: none    Diet: regular (no restrictions)            Problem list and histories reviewed & adjusted, as indicated.  Additional history: as documented    Patient Active Problem List   Diagnosis     Social phobia     Sleep disorder      "CARDIOVASCULAR SCREENING; LDL GOAL LESS THAN 160     ADD (attention deficit hyperactivity disorder, inattentive type)     History of cold sores     Mild recurrent major depression (H)     Past Surgical History:   Procedure Laterality Date     TONSILLECTOMY  1990       Social History   Substance Use Topics     Smoking status: Former Smoker     Packs/day: 0.50     Quit date: 1/1/2013     Smokeless tobacco: Never Used     Alcohol use No      Comment: treatment in 2006     Family History   Problem Relation Age of Onset     Depression Sister      Depression Mother      DIABETES Mother      borderline     Hypertension Mother      Prostate Cancer Father 60     Lipids Father      Psychotic Disorder Maternal Uncle      suicide     Cancer - colorectal No family hx of            Reviewed and updated as needed this visit by clinical staff  Tobacco  Allergies  Meds       Reviewed and updated as needed this visit by Provider         ROS:  Constitutional, HEENT, cardiovascular, pulmonary, gi and gu systems are negative, except as otherwise noted.    OBJECTIVE:     BP (!) 138/98  Pulse 102  Temp 97.7  F (36.5  C) (Tympanic)  Resp 16  Ht 6' 2.5\" (1.892 m)  Wt 222 lb (100.7 kg)  SpO2 96%  BMI 28.12 kg/m2  Body mass index is 28.12 kg/(m^2).   GENERAL: healthy, alert and no distress  PSYCH: No signs of self harming behaviours. Normal hygiene & dress. Eye contact normal. Speech/mentation normal.      Diagnostic Test Results:  none     ASSESSMENT:      Social phobia  Attention deficit disorder, unspecified hyperactivity presence  Mild recurrent major depression (H)  Sleep disorder  Elevated blood pressure reading without diagnosis of hypertension      PLAN:       ICD-10-CM    1. Mild recurrent major depression (H) F33.0 venlafaxine (EFFEXOR) 37.5 MG tablet     venlafaxine (EFFEXOR-XR) 75 MG 24 hr capsule   2. Social phobia F40.10 LORazepam (ATIVAN) 0.5 MG tablet     venlafaxine (EFFEXOR) 37.5 MG tablet     traZODone (DESYREL) " 100 MG tablet     venlafaxine (EFFEXOR-XR) 75 MG 24 hr capsule   3. Attention deficit disorder, unspecified hyperactivity presence F98.8 amphetamine-dextroamphetamine (ADDERALL XR) 20 MG per 24 hr capsule     DISCONTINUED: amphetamine-dextroamphetamine (ADDERALL XR) 20 MG per 24 hr capsule     DISCONTINUED: amphetamine-dextroamphetamine (ADDERALL XR) 20 MG per 24 hr capsule   4. Sleep disorder G47.9 traZODone (DESYREL) 100 MG tablet   5. Elevated blood pressure reading without diagnosis of hypertension R03.0        BP Screening:   Last 3 BP Readings:    BP Readings from Last 3 Encounters:   01/30/18 (!) 138/98   07/05/17 138/88   01/31/17 122/82       The following was recommended to the patient:  Re-screen within 4 weeks and recommend lifestyle modifications I would like him to watch his carefully and my chart me if his pressures are staying elevated greater than 140/90.  Recheck today was 138/98.  Healthy lifestyle reviewed with him today which likely would help the blood pressure is well.  But have concerns with the use of the Adderall that potentially this could be pushing it up.  May need to consider plain Ritalin if that is the case.  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    MEDICATIONS:  Continue current medications without change  FUTURE APPOINTMENTS:       - Follow-up visit in 6 months unless blood pressure remains elevated then should come in sooner for management and will need to get baseline lab studies.    Reviewed with him that he can do an E visit on the off 6 month timeframe so in 1 year he could do an E visit for refill of medications and do his PHQ 9 form via my chart.    Jackelyn Mahajan PA-C  [facility]    GREATER THAN 50% OF TIME SPENT IN COUNSELING & CARE COORDINATION - TOTAL FACE TO FACE TIME  20 MINUTES.    Orders Placed This Encounter     LORazepam (ATIVAN) 0.5 MG tablet     venlafaxine  (EFFEXOR) 37.5 MG tablet     DISCONTD: amphetamine-dextroamphetamine (ADDERALL XR) 20 MG per 24 hr capsule     DISCONTD: amphetamine-dextroamphetamine (ADDERALL XR) 20 MG per 24 hr capsule     amphetamine-dextroamphetamine (ADDERALL XR) 20 MG per 24 hr capsule     traZODone (DESYREL) 100 MG tablet     venlafaxine (EFFEXOR-XR) 75 MG 24 hr capsule       AVS given to patient upon discharge today.  Electronically signed by Jackelyn Mahajan PA-C  January 31, 2018  8:50 AM

## 2018-01-30 NOTE — NURSING NOTE
"Chief Complaint   Patient presents with     A.D.H.D     Depression       Initial BP (!) 142/100  Pulse 102  Temp 97.7  F (36.5  C) (Tympanic)  Resp 16  Ht 6' 2.5\" (1.892 m)  Wt 222 lb (100.7 kg)  SpO2 96%  BMI 28.12 kg/m2 Estimated body mass index is 28.12 kg/(m^2) as calculated from the following:    Height as of this encounter: 6' 2.5\" (1.892 m).    Weight as of this encounter: 222 lb (100.7 kg).  BP completed using cuff size: carly Almaraz MA      "

## 2018-01-31 ASSESSMENT — PATIENT HEALTH QUESTIONNAIRE - PHQ9: SUM OF ALL RESPONSES TO PHQ QUESTIONS 1-9: 2

## 2018-05-02 ENCOUNTER — MYC REFILL (OUTPATIENT)
Dept: FAMILY MEDICINE | Facility: CLINIC | Age: 38
End: 2018-05-02

## 2018-05-02 DIAGNOSIS — F40.10 SOCIAL PHOBIA: ICD-10-CM

## 2018-05-02 RX ORDER — LORAZEPAM 0.5 MG/1
TABLET ORAL
Qty: 90 TABLET | Refills: 0 | Status: SHIPPED | OUTPATIENT
Start: 2018-05-02 | End: 2018-08-09

## 2018-05-02 NOTE — TELEPHONE ENCOUNTER
Message from [employer]:  Original authorizing provider: JEREMIE Martin would like a refill of the following medications:  LORazepam (ATIVAN) 0.5 MG tablet [Jackelyn Mahajan PA-C]    Preferred pharmacy: [facility] [address]    Comment:

## 2018-05-02 NOTE — TELEPHONE ENCOUNTER
LORAZEPAM      Last Written Prescription Date:  1/31/18  Last Fill Quantity: 90,   # refills: 0  Last Office Visit: 1/30/18  Future Office visit:       Routing refill request to provider for review/approval because:  Drug not on the G, P or [facility] refill protocol or controlled substance

## 2018-06-14 ENCOUNTER — MYC MEDICAL ADVICE (OUTPATIENT)
Dept: FAMILY MEDICINE | Facility: CLINIC | Age: 38
End: 2018-06-14
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
## 2018-06-15 ENCOUNTER — TELEPHONE (OUTPATIENT)
Dept: FAMILY MEDICINE | Facility: CLINIC | Age: 38
End: 2018-06-15

## 2018-06-15 NOTE — TELEPHONE ENCOUNTER
Prior Authorization Retail Medication Request    Medication/Dose: Adderall XR 20 mg  ICD code (if different than what is on RX):    Previously Tried and Failed:  Adderall 10 mg BID  Rationale:  High risk of significant adverse clinical outcome with medication change.  Inadequate therapeutic response with lower does tried.    Insurance Name:  [employer] ([phone])  Insurance ID:  [id number], Group # [id number]      Pharmacy Information (if different than what is on RX)  Name:  [facility]  Phone:  [phone]

## 2018-06-18 NOTE — TELEPHONE ENCOUNTER
Central Prior Authorization Team   Phone: [phone]    PA Initiation    Medication: Adderall XR 20 mg  Insurance Company: [employer] - Phone [phone] Fax [phone]  Pharmacy Filling the Rx: [facility] - [address]  Filling Pharmacy Phone: [phone]  Filling Pharmacy Fax: [phone]  Start Date: 6/18/2018

## 2018-06-21 NOTE — TELEPHONE ENCOUNTER
Prior Authorization Approval    Authorization Effective Date: 5/20/2018  Authorization Expiration Date: 6/20/2019  Medication: Adderall XR 20 mg-APPROVED  Approved Dose/Quantity:    Reference #:  [id number]   Insurance Company: [employer] - Phone [phone] Fax [phone]  Expected CoPay:       CoPay Card Available:      Foundation Assistance Needed:    Which Pharmacy is filling the prescription (Not needed for infusion/clinic administered): [facility] - [place], [place] - [address] [place] [address]  Pharmacy Notified: Yes  Patient Notified: Yes

## 2018-08-01 ENCOUNTER — MYC REFILL (OUTPATIENT)
Dept: FAMILY MEDICINE | Facility: CLINIC | Age: 38
End: 2018-08-01

## 2018-08-01 DIAGNOSIS — F33.0 MILD RECURRENT MAJOR DEPRESSION (H): ICD-10-CM

## 2018-08-01 DIAGNOSIS — F40.10 SOCIAL PHOBIA: ICD-10-CM

## 2018-08-02 RX ORDER — VENLAFAXINE HYDROCHLORIDE 75 MG/1
225 CAPSULE, EXTENDED RELEASE ORAL DAILY
Qty: 90 CAPSULE | Refills: 0 | Status: SHIPPED | OUTPATIENT
Start: 2018-08-02 | End: 2018-08-09

## 2018-08-02 NOTE — TELEPHONE ENCOUNTER
Message from [email or web address]:  Original authorizing provider: JEREMIE Martin would like a refill of the following medications:  venlafaxine (EFFEXOR-XR) 75 MG 24 hr capsule [Jackelyn Mahajan PA-C]    Preferred pharmacy: [facility] [address]    Comment:

## 2018-08-02 NOTE — TELEPHONE ENCOUNTER
"Requested Prescriptions   Pending Prescriptions Disp Refills     venlafaxine (EFFEXOR-XR) 75 MG 24 hr capsule  Last Written Prescription Date:  6/29/18  Last Fill Quantity: 90,  # refills: 0   Last office visit: 1/30/2018 with prescribing provider:  1/30/18   Future Office Visit:   Next 5 appointments (look out 90 days)     Aug 09, 2018  2:00 PM CDT   [facility] Jackelyn [facility]ajan [facility]-C   [facility]    [address] [place] [id number] [phone]                  90 capsule 0     Sig: Take 3 capsules (225 mg) by mouth daily    Serotonin-Norepinephrine Reuptake Inhibitors  Failed    8/2/2018  8:56 AM       Failed - Blood pressure under 140/90 in past 12 months    BP Readings from Last 3 Encounters:   01/30/18 (!) 138/98   07/05/17 138/88   01/31/17 122/82                Failed - PHQ-9 score of less than 5 in past 6 months    Please review last PHQ-9 score.          Failed - Normal serum creatinine on file in past 12 months    Recent Labs   Lab Test  07/07/16   1220   CR  0.85            Passed - Patient is age 18 or older       Passed - Recent (6 mo) or future (30 days) visit within the authorizing provider's specialty    Patient had office visit in the last 6 months or has a visit in the next 30 days with authorizing provider or within the authorizing provider's specialty.  See \"Patient Info\" tab in inbasket, or \"Choose Columns\" in Meds & Orders section of the refill encounter.                "

## 2018-08-09 ENCOUNTER — OFFICE VISIT (OUTPATIENT)
Dept: FAMILY MEDICINE | Facility: CLINIC | Age: 38
End: 2018-08-09
Payer: COMMERCIAL

## 2018-08-09 VITALS
TEMPERATURE: 97 F | DIASTOLIC BLOOD PRESSURE: 104 MMHG | BODY MASS INDEX: 28.72 KG/M2 | RESPIRATION RATE: 16 BRPM | HEART RATE: 90 BPM | SYSTOLIC BLOOD PRESSURE: 154 MMHG | OXYGEN SATURATION: 98 % | HEIGHT: 75 IN | WEIGHT: 231 LBS

## 2018-08-09 DIAGNOSIS — F40.10 SOCIAL PHOBIA: ICD-10-CM

## 2018-08-09 DIAGNOSIS — B00.1 COLD SORE: ICD-10-CM

## 2018-08-09 DIAGNOSIS — F33.0 MILD RECURRENT MAJOR DEPRESSION (H): ICD-10-CM

## 2018-08-09 DIAGNOSIS — R03.0 ELEVATED BLOOD PRESSURE READING WITHOUT DIAGNOSIS OF HYPERTENSION: ICD-10-CM

## 2018-08-09 DIAGNOSIS — Z00.01 ENCOUNTER FOR ROUTINE ADULT MEDICAL EXAM WITH ABNORMAL FINDINGS: Primary | ICD-10-CM

## 2018-08-09 DIAGNOSIS — G47.9 SLEEP DISORDER: ICD-10-CM

## 2018-08-09 DIAGNOSIS — F98.8 ATTENTION DEFICIT DISORDER, UNSPECIFIED HYPERACTIVITY PRESENCE: ICD-10-CM

## 2018-08-09 PROCEDURE — 99395 PREV VISIT EST AGE 18-39: CPT | Performed by: PHYSICIAN ASSISTANT

## 2018-08-09 RX ORDER — DEXTROAMPHETAMINE SACCHARATE, AMPHETAMINE ASPARTATE MONOHYDRATE, DEXTROAMPHETAMINE SULFATE AND AMPHETAMINE SULFATE 5; 5; 5; 5 MG/1; MG/1; MG/1; MG/1
20 CAPSULE, EXTENDED RELEASE ORAL DAILY
Qty: 30 CAPSULE | Refills: 0 | Status: SHIPPED | OUTPATIENT
Start: 2018-10-04 | End: 2019-03-07

## 2018-08-09 RX ORDER — DEXTROAMPHETAMINE SACCHARATE, AMPHETAMINE ASPARTATE MONOHYDRATE, DEXTROAMPHETAMINE SULFATE AND AMPHETAMINE SULFATE 5; 5; 5; 5 MG/1; MG/1; MG/1; MG/1
20 CAPSULE, EXTENDED RELEASE ORAL DAILY
Qty: 30 CAPSULE | Refills: 0 | Status: SHIPPED | OUTPATIENT
Start: 2018-09-06 | End: 2018-08-09

## 2018-08-09 RX ORDER — LORAZEPAM 0.5 MG/1
TABLET ORAL
Qty: 90 TABLET | Refills: 0 | Status: SHIPPED | OUTPATIENT
Start: 2018-08-09 | End: 2019-01-08

## 2018-08-09 RX ORDER — DEXTROAMPHETAMINE SACCHARATE, AMPHETAMINE ASPARTATE MONOHYDRATE, DEXTROAMPHETAMINE SULFATE AND AMPHETAMINE SULFATE 5; 5; 5; 5 MG/1; MG/1; MG/1; MG/1
20 CAPSULE, EXTENDED RELEASE ORAL DAILY
Qty: 30 CAPSULE | Refills: 0 | Status: SHIPPED | OUTPATIENT
Start: 2018-08-09 | End: 2018-08-09

## 2018-08-09 RX ORDER — TRAZODONE HYDROCHLORIDE 100 MG/1
100 TABLET ORAL AT BEDTIME
Qty: 90 TABLET | Refills: 3 | Status: SHIPPED | OUTPATIENT
Start: 2018-08-09 | End: 2019-09-17

## 2018-08-09 RX ORDER — VENLAFAXINE 37.5 MG/1
TABLET ORAL
Qty: 60 TABLET | Refills: 0 | Status: CANCELLED | OUTPATIENT
Start: 2018-08-09

## 2018-08-09 RX ORDER — VALACYCLOVIR HYDROCHLORIDE 1 G/1
TABLET, FILM COATED ORAL
Qty: 28 TABLET | Refills: 2 | Status: SHIPPED | OUTPATIENT
Start: 2018-08-09 | End: 2020-03-26

## 2018-08-09 RX ORDER — VENLAFAXINE HYDROCHLORIDE 75 MG/1
225 CAPSULE, EXTENDED RELEASE ORAL DAILY
Qty: 270 CAPSULE | Refills: 1 | Status: SHIPPED | OUTPATIENT
Start: 2018-08-09 | End: 2019-02-18

## 2018-08-09 ASSESSMENT — PAIN SCALES - GENERAL: PAINLEVEL: NO PAIN (0)

## 2018-08-09 NOTE — NURSING NOTE
"Chief Complaint   Patient presents with     Physical       Initial BP (!) 142/102  Pulse 90  Temp 97  F (36.1  C) (Temporal)  Resp 16  Ht 6' 2.5\" (1.892 m)  Wt 231 lb (104.8 kg)  SpO2 98%  BMI 29.26 kg/m2 Estimated body mass index is 29.26 kg/(m^2) as calculated from the following:    Height as of this encounter: 6' 2.5\" (1.892 m).    Weight as of this encounter: 231 lb (104.8 kg).  BP completed using cuff size: carly Almaraz MA      "

## 2018-08-09 NOTE — MR AVS SNAPSHOT
After Visit Summary   8/9/2018    Roberto Guillen    MRN: [id number]           Patient Information     Date Of Birth          1980        Visit Information        Provider Department      8/9/2018 2:00 PM Jackelyn Mahajan PA-C [facility]        Today's Diagnoses     Encounter for routine adult medical exam with abnormal findings    -  1    Mild recurrent major depression (H)        Attention deficit disorder, unspecified hyperactivity presence        Sleep disorder        Social phobia        Cold sore-history of        Elevated blood pressure reading without diagnosis of hypertension          Care Instructions      Preventive Health Recommendations  Male Ages 26 - 39    Yearly exam:             See your health care provider every year in order to  o   Review health changes.   o   Discuss preventive care.    o   Review your medicines if your doctor has prescribed any.    You should be tested each year for STDs (sexually transmitted diseases), if you re at risk.     After age 35, talk to your provider about cholesterol testing. If you are at risk for heart disease, have your cholesterol tested at least every 5 years.     If you are at risk for diabetes, you should have a diabetes test (fasting glucose).  Shots: Get a flu shot each year. Get a tetanus shot every 10 years.     Nutrition:    Eat at least 5 servings of fruits and vegetables daily.     Eat whole-grain bread, whole-wheat pasta and brown rice instead of white grains and rice.     Get adequate Calcium and Vitamin D.     Lifestyle    Exercise for at least 150 minutes a week (30 minutes a day, 5 days a week). This will help you control your weight and prevent disease.     Limit alcohol to one drink per day.     No smoking.     Wear sunscreen to prevent skin cancer.     See your dentist every six months for an exam and cleaning.             Follow-ups after your visit        Your next 10 appointments already  "scheduled     Aug 15, 2018  1:45 PM CDT   Nurse Only with ANGEL MONTELONGO [facility]   [facility]    [address] [facility] [place] [facility] [address] [phone]              Who to contact     If you have questions or need follow up information about today's clinic visit or your schedule please contact [facility] directly at [phone].  Normal or non-critical lab and imaging results will be communicated to you by [employer], letter or phone within 4 business days after the clinic has received the results. If you do not hear from us within 7 days, please contact the clinic through [employer] or phone. If you have a critical or abnormal lab result, we will notify you by phone as soon as possible.  Submit refill requests through [employer] or call your pharmacy and they will forward the refill request to us. Please allow 3 business days for your refill to be completed.          Additional Information About Your Visit        [employer] Information     [employer] gives you secure access to your electronic health record. If you see a primary care provider, you can also send messages to your care team and make appointments. If you have questions, please call your primary care clinic.  If you do not have a primary care provider, please call [phone] and they will assist you.        Care EveryWhere ID     This is your Care EveryWhere ID. This could be used by other organizations to access your [facility] medical records  [id number]        Your Vitals Were     Pulse Temperature Respirations Height Pulse Oximetry BMI (Body Mass Index)    90 97  F (36.1  C) (Temporal) 16 6' 2.5\" (1.892 m) 98% 29.26 kg/m2       Blood Pressure from Last 3 Encounters:   08/09/18 (!) 154/104   01/30/18 (!) 138/98   07/05/17 138/88    Weight from Last 3 Encounters:   08/09/18 231 lb (104.8 kg)   01/30/18 222 lb (100.7 kg)   07/05/17 215 lb (97.5 kg)              We Performed the Following     DEPRESSION ACTION " PLAN (DAP)          Today's Medication Changes          These changes are accurate as of 8/9/18 11:59 PM.  If you have any questions, ask your nurse or doctor.               Start taking these medicines.        Dose/Directions    amphetamine-dextroamphetamine 20 MG per 24 hr capsule   Commonly known as:  ADDERALL XR   Used for:  Attention deficit disorder, unspecified hyperactivity presence   Started by:  Jackelyn Mahajan PA-C        Dose:  20 mg   Start taking on:  10/4/2018   Take 1 capsule (20 mg) by mouth daily   Quantity:  30 capsule   Refills:  0            Where to get your medicines      These medications were sent to [facility] [address]  [address] [place] [address]     Phone:  [phone]     traZODone 100 MG tablet    valACYclovir 1000 mg tablet    venlafaxine 75 MG 24 hr capsule         Some of these will need a paper prescription and others can be bought over the counter.  Ask your nurse if you have questions.     Bring a paper prescription for each of these medications     amphetamine-dextroamphetamine 20 MG per 24 hr capsule    LORazepam 0.5 MG tablet                Primary Care Provider Office Phone # Fax #    Jackelyn Mahajan PA-C [phone] [address] [place] [address]        Equal Access to Services     ANNETTE BARRETT AH: Hadii nighat spears hadasho Soomaali, waaxda luqadaha, qaybta kaalmada adeegyada, waxay darshan jones. So [facility] [phone].    ATENCIÓN: Si habla español, tiene a otto disposición servicios gratuitos de asistencia lingüística. Llame al [phone].    We comply with applicable federal civil rights laws and [place] laws. We do not discriminate on the basis of race, color, national origin, age, disability, sex, sexual orientation, or gender identity.            Thank you!     Thank you for choosing [facility]  for your care. Our goal is always to provide  you with excellent care. Hearing back from our patients is one way we can continue to improve our services. Please take a few minutes to complete the written survey that you may receive in the mail after your visit with us. Thank you!             Your Updated Medication List - Protect others around you: Learn how to safely use, store and throw away your medicines at www.disposemymeds.org.          This list is accurate as of 8/9/18 11:59 PM.  Always use your most recent med list.                   Brand Name Dispense Instructions for use Diagnosis    amphetamine-dextroamphetamine 20 MG per 24 hr capsule   Start taking on:  10/4/2018    ADDERALL XR    30 capsule    Take 1 capsule (20 mg) by mouth daily    Attention deficit disorder, unspecified hyperactivity presence       LORazepam 0.5 MG tablet    ATIVAN    90 tablet    ONE - TWO EVERY 6 HOURS, AS NEEDED FOR ANXIETY.    Social phobia       traZODone 100 MG tablet    DESYREL    90 tablet    Take 1 tablet (100 mg) by mouth At Bedtime for sleep    Sleep disorder, Social phobia       valACYclovir 1000 mg tablet    VALTREX    28 tablet    Take  by mouth. 2 Tabs bid x1 day for cold sore outbreak. 4 tablets for treatment.    Cold sore       * venlafaxine 37.5 MG tablet    EFFEXOR    60 tablet    One tablet to be taken if extended release Effexor is missed (to help alleviate side effects)    Mild recurrent major depression (H), Social phobia       * venlafaxine 75 MG 24 hr capsule    EFFEXOR-XR    270 capsule    Take 3 capsules (225 mg) by mouth daily    Social phobia, Mild recurrent major depression (H)       * Notice:  This list has 2 medication(s) that are the same as other medications prescribed for you. Read the directions carefully, and ask your doctor or other care provider to review them with you.

## 2018-08-09 NOTE — PROGRESS NOTES
SUBJECTIVE:   CC: Roberto Guillen is an 38 year old male who presents for preventative health visit.     Physical   Annual:     Getting at least 3 servings of Calcium per day:  Yes    Bi-annual eye exam:  NO    Dental care twice a year:  NO    Sleep apnea or symptoms of sleep apnea:  None    Diet:  Regular (no restrictions)    Frequency of exercise:  2-3 days/week    Duration of exercise:  15-30 minutes    Taking medications regularly:  Yes    Medication side effects:  None    Additional concerns today:  No        Depression/social phobia/anxiety/ADD Followup  I have seen him for years for anxiety and depression.  Has been stable on 225 mg of venlafaxine daily for several years.  Periodically if he misses a dose he will use a 37.5 mg tablet which is very rare.  He is also used rare lorazepam off and on through the years as well.  Has had big issues with social anxiety which was diagnosed through a counselor-overall has been well managed, but has been of concern.  Also uses trazodone for sleep 100 mg.  He was evaluated with a neuropsych test and has been on Adderall for the past several years for a documented diagnosis of ADD.  Has done very well on extended release Adderall 20 mg daily.        Status since last visit: Stable     See PHQ-9 for current symptoms.  Other associated symptoms: None    Complicating factors:   Significant life event:  No   Current substance abuse:  None  Anxiety or Panic symptoms:  No    PHQ-9 7/5/2017 1/30/2018 8/9/2018   Total Score 2 2 1   Q9: Suicide Ideation Not at all Not at all Not at all       PHQ-9  English  PHQ-9   Any Language  Suicide Assessment Five-step Evaluation and Treatment (SAFE-T)    Recheck ADHD     Today's PHQ-2 Score:   PHQ-2 ( 1999 [employer]) 8/9/2018   Q1: Little interest or pleasure in doing things 0   Q2: Feeling down, depressed or hopeless 0   PHQ-2 Score 0   Q1: Little interest or pleasure in doing things Not at all   Q2: Feeling down, depressed or hopeless  Not at all   PHQ-2 Score 0        Abuse: Current or Past(Physical, Sexual or Emotional)- No  Do you feel safe in your environment - Yes    Social History   Substance Use Topics     Smoking status: Former Smoker     Packs/day: 0.50     Quit date: 1/1/2013     Smokeless tobacco: Never Used     Alcohol use No      Comment: treatment in 2006     Alcohol Use 8/9/2018   If you drink alcohol do you typically have greater than 3 drinks per day OR greater than 7 drinks per week? No   No flowsheet data found.    Last PSA: No results found for: PSA    Reviewed orders with patient. Reviewed health maintenance and updated orders accordingly - Yes  Patient Active Problem List   Diagnosis     Social phobia     Sleep disorder     CARDIOVASCULAR SCREENING; LDL GOAL LESS THAN 160     ADD (attention deficit hyperactivity disorder, inattentive type)     History of cold sores     Mild recurrent major depression (H)     Past Surgical History:   Procedure Laterality Date     TONSILLECTOMY  1990       Social History   Substance Use Topics     Smoking status: Former Smoker     Packs/day: 0.50     Quit date: 1/1/2013     Smokeless tobacco: Never Used     Alcohol use No      Comment: treatment in 2006     Family History   Problem Relation Age of Onset     Depression Sister      Depression Mother      Diabetes Mother      borderline     Hypertension Mother      Prostate Cancer Father 60     Lipids Father      Psychotic Disorder Maternal Uncle      suicide     Cancer - colorectal No family hx of            Reviewed and updated as needed this visit by clinical staff  Tobacco  Allergies  Meds         Reviewed and updated as needed this visit by Provider            Review of Systems  CONSTITUTIONAL: NEGATIVE for fever, chills, change in weight  INTEGUMENTARY/SKIN: NEGATIVE for worrisome rashes, moles or lesions  EYES: NEGATIVE for vision changes or irritation  ENT: NEGATIVE for ear, mouth and throat problems  RESP: NEGATIVE for significant  "cough or SOB  CV: NEGATIVE for chest pain, palpitations or peripheral edema  GI: NEGATIVE for nausea, abdominal pain, heartburn, or change in bowel habits   male: negative for dysuria, hematuria, decreased urinary stream, erectile dysfunction, urethral discharge  MUSCULOSKELETAL: NEGATIVE for significant arthralgias or myalgia  NEURO: NEGATIVE for weakness, dizziness or paresthesias  ENDOCRINE: NEGATIVE for temperature intolerance, skin/hair changes  HEME/ALLERGY/IMMUNE: NEGATIVE for bleeding problems  PSYCHIATRIC: NEGATIVE for changes in mood or affect    OBJECTIVE:   BP (!) 154/104  Pulse 90  Temp 97  F (36.1  C) (Temporal)  Resp 16  Ht 6' 2.5\" (1.892 m)  Wt 231 lb (104.8 kg)  SpO2 98%  BMI 29.26 kg/m2    Physical Exam  GENERAL: alert, no distress and over weight  EYES: Eyes grossly normal to inspection, PERRL and conjunctivae and sclerae normal  HENT: ear canals and TM's normal, nose and mouth without ulcers or lesions  NECK: no adenopathy, no asymmetry, masses, or scars and thyroid normal to palpation  RESP: lungs clear to auscultation - no rales, rhonchi or wheezes  CV: regular rate and rhythm, normal S1 S2, no S3 or S4, no murmur, click or rub, no peripheral edema and peripheral pulses strong  ABDOMEN: soft, nontender, no hepatosplenomegaly, no masses and bowel sounds normal  MS: no gross musculoskeletal defects noted, no edema  SKIN: no suspicious lesions or rashes  NEURO: Normal strength and tone, mentation intact and speech normal  PSYCH: mentation appears normal, affect normal/bright    Diagnostic Test Results:  Future lipid test ordered.    ASSESSMENT/PLAN:       ICD-10-CM    1. Encounter for routine adult medical exam with abnormal findings Z00.01 Lipid Profile (Chol, Trig, HDL, LDL calc)   2. Mild recurrent major depression (H) F33.0 DEPRESSION ACTION PLAN (DAP)     venlafaxine (EFFEXOR-XR) 75 MG 24 hr capsule   3. Attention deficit disorder, unspecified hyperactivity presence F98.8 " "amphetamine-dextroamphetamine (ADDERALL XR) 20 MG per 24 hr capsule     DISCONTINUED: amphetamine-dextroamphetamine (ADDERALL XR) 20 MG per 24 hr capsule     DISCONTINUED: amphetamine-dextroamphetamine (ADDERALL XR) 20 MG per 24 hr capsule   4. Sleep disorder G47.9 traZODone (DESYREL) 100 MG tablet   5. Social phobia F40.10 traZODone (DESYREL) 100 MG tablet     venlafaxine (EFFEXOR-XR) 75 MG 24 hr capsule     LORazepam (ATIVAN) 0.5 MG tablet   6. Cold sore-history of B00.1 valACYclovir (VALTREX) 1000 mg tablet   7. Elevated blood pressure reading without diagnosis of hypertension R03.0        COUNSELING:   Reviewed preventive health counseling, as reflected in patient instructions       Consider AAA screening for ages 65-75 and smoking history       Regular exercise       Healthy diet/nutrition       Vision screening    BP Readings from Last 1 Encounters:   08/09/18 (!) 154/104     Estimated body mass index is 29.26 kg/(m^2) as calculated from the following:    Height as of this encounter: 6' 2.5\" (1.892 m).    Weight as of this encounter: 231 lb (104.8 kg).    BP Screening:   Last 3 BP Readings:    BP Readings from Last 3 Encounters:   08/09/18 (!) 154/104   01/30/18 (!) 138/98   07/05/17 138/88     I would like him to return in a week to have a follow-up visit blood pressure follow-up as his blood pressure is elevated.  Concerns with the use of the Adderall if he is using that medication.  If his blood pressure remains elevated will need to do baseline lab studies and consider medical management.  He is told to watch very carefully and also watch for symptoms of elevated pressure including visual disturbance, headaches, chest pain, shortness of breath, peripheral edema.  If he notices anything suspicious, he should be coming in sooner.    Next follow-up for medications including depression, ADD, anxiety meds will need to be in 6 months.    Yearly medications refilled- six-month follow-up with refill of meds again " at that time.  He is aware that I will be leaving the [facility] system and will need to establish care with a new provider.  Urged to start looking at making that transition  The following was recommended to the patient:  Recommend lifestyle modifications  Weight management plan: Discussed healthy diet and exercise guidelines and patient will follow up in 12 months in clinic to re-evaluate.     reports that he quit smoking about 5 years ago. He smoked 0.50 packs per day. He has never used smokeless tobacco.      Counseling Resources:  ATP IV Guidelines  Pooled Cohorts Equation Calculator  FRAX Risk Assessment  ICSI Preventive Guidelines  Dietary Guidelines for Americans, 2010  USDA's MyPlate  ASA Prophylaxis  Lung CA Screening    Jackelyn Mahajan PA-C  [facility]    Orders Placed This Encounter     DEPRESSION ACTION PLAN (DAP)     Lipid Profile (Chol, Trig, HDL, LDL calc)     DISCONTD: amphetamine-dextroamphetamine (ADDERALL XR) 20 MG per 24 hr capsule     traZODone (DESYREL) 100 MG tablet     venlafaxine (EFFEXOR-XR) 75 MG 24 hr capsule     LORazepam (ATIVAN) 0.5 MG tablet     valACYclovir (VALTREX) 1000 mg tablet     DISCONTD: amphetamine-dextroamphetamine (ADDERALL XR) 20 MG per 24 hr capsule     amphetamine-dextroamphetamine (ADDERALL XR) 20 MG per 24 hr capsule       AVS given to patient upon discharge today.  Electronically signed by Jackelyn Mahajan PA-C  August 14, 2018  5:18 PM

## 2018-08-10 ASSESSMENT — PATIENT HEALTH QUESTIONNAIRE - PHQ9: SUM OF ALL RESPONSES TO PHQ QUESTIONS 1-9: 1

## 2018-08-17 ENCOUNTER — ALLIED HEALTH/NURSE VISIT (OUTPATIENT)
Dept: FAMILY MEDICINE | Facility: CLINIC | Age: 38
End: 2018-08-17
Payer: COMMERCIAL

## 2018-08-17 VITALS — DIASTOLIC BLOOD PRESSURE: 86 MMHG | SYSTOLIC BLOOD PRESSURE: 140 MMHG | HEART RATE: 80 BPM

## 2018-08-17 DIAGNOSIS — Z01.30 BLOOD PRESSURE CHECK: Primary | ICD-10-CM

## 2018-08-17 PROCEDURE — 99207 ZZC NO CHARGE NURSE ONLY: CPT

## 2018-08-17 NOTE — PROGRESS NOTES
Please contact patient - I would like him to check his BP a couple of times a week either at work, here with float nurse or at home.  If consistently over the next few weeks elevated my first thought would be to change his ADD management from Adderall to Ritalin - Ritalin doesn't seem to raise BP like Adderall can.  He should mychart me with numbers if doing this outside of [facility].  Electronically signed by Jackelyn Mahajan PA-C  8/17/2018

## 2018-08-17 NOTE — NURSING NOTE
Roberto Guillen is a 38 year old patient who comes in today for a Blood Pressure check.  Initial BP:  /86  Pulse 80     80  Disposition: results routed to provider

## 2018-08-17 NOTE — MR AVS SNAPSHOT
After Visit Summary   8/17/2018    Roberto Guillen    MRN: [id number]           Patient Information     Date Of Birth          1980        Visit Information        Provider Department      8/17/2018 2:15 PM ANGEL MONTELONGO MA [facility]        Today's Diagnoses     Blood pressure check    -  1       Follow-ups after your visit        Who to contact     If you have questions or need follow up information about today's clinic visit or your schedule please contact [facility] directly at [phone].  Normal or non-critical lab and imaging results will be communicated to you by Socruisehart, letter or phone within 4 business days after the clinic has received the results. If you do not hear from us within 7 days, please contact the clinic through Intronist or phone. If you have a critical or abnormal lab result, we will notify you by phone as soon as possible.  Submit refill requests through Showroomprive or call your pharmacy and they will forward the refill request to us. Please allow 3 business days for your refill to be completed.          Additional Information About Your Visit        MyChart Information     Showroomprive gives you secure access to your electronic health record. If you see a primary care provider, you can also send messages to your care team and make appointments. If you have questions, please call your primary care clinic.  If you do not have a primary care provider, please call [phone] and they will assist you.        Care EveryWhere ID     This is your Care EveryWhere ID. This could be used by other organizations to access your [facility] medical records  [id number]        Your Vitals Were     Pulse                   80            Blood Pressure from Last 3 Encounters:   08/17/18 140/86   08/09/18 (!) 154/104   01/30/18 (!) 138/98    Weight from Last 3 Encounters:   08/09/18 231 lb (104.8 kg)   01/30/18 222 lb (100.7 kg)   07/05/17 215 lb (97.5 kg)               Today, you had the following     No orders found for display       Primary Care Provider Office Phone # Fax #    Jackelyn Mahajan PA-C [phone]       [address] [place] [address]        Equal Access to Services     ANNETTE BARRETT : Hadii nighat ku hadkeno Soomaali, waaxda luqadaha, qaybta kaalmada adeegyada, waxearlene shenn adeda haile laMarybetheddie jones. So [facility] [phone].    ATENCIÓN: Si habla español, tiene a otto disposición servicios gratuitos de asistencia lingüística. Llame al [phone].    We comply with applicable federal civil rights laws and [place] laws. We do not discriminate on the basis of race, color, national origin, age, disability, sex, sexual orientation, or gender identity.            Thank you!     Thank you for choosing [facility]  for your care. Our goal is always to provide you with excellent care. Hearing back from our patients is one way we can continue to improve our services. Please take a few minutes to complete the written survey that you may receive in the mail after your visit with us. Thank you!             Your Updated Medication List - Protect others around you: Learn how to safely use, store and throw away your medicines at www.disposemymeds.org.          This list is accurate as of 8/17/18  2:17 PM.  Always use your most recent med list.                   Brand Name Dispense Instructions for use Diagnosis    amphetamine-dextroamphetamine 20 MG per 24 hr capsule   Start taking on:  10/4/2018    ADDERALL XR    30 capsule    Take 1 capsule (20 mg) by mouth daily    Attention deficit disorder, unspecified hyperactivity presence       LORazepam 0.5 MG tablet    ATIVAN    90 tablet    ONE - TWO EVERY 6 HOURS, AS NEEDED FOR ANXIETY.    Social phobia       traZODone 100 MG tablet    DESYREL    90 tablet    Take 1 tablet (100 mg) by mouth At Bedtime for sleep    Sleep disorder, Social phobia       valACYclovir 1000 mg tablet    VALTREX    28  tablet    Take  by mouth. 2 Tabs bid x1 day for cold sore outbreak. 4 tablets for treatment.    Cold sore       * venlafaxine 37.5 MG tablet    EFFEXOR    60 tablet    One tablet to be taken if extended release Effexor is missed (to help alleviate side effects)    Mild recurrent major depression (H), Social phobia       * venlafaxine 75 MG 24 hr capsule    EFFEXOR-XR    270 capsule    Take 3 capsules (225 mg) by mouth daily    Social phobia, Mild recurrent major depression (H)       * Notice:  This list has 2 medication(s) that are the same as other medications prescribed for you. Read the directions carefully, and ask your doctor or other care provider to review them with you.

## 2018-08-20 ENCOUNTER — MYC MEDICAL ADVICE (OUTPATIENT)
Dept: FAMILY MEDICINE | Facility: CLINIC | Age: 38
End: 2018-08-20

## 2018-08-20 DIAGNOSIS — R03.0 ELEVATED BLOOD PRESSURE READING WITHOUT DIAGNOSIS OF HYPERTENSION: Primary | ICD-10-CM

## 2018-10-19 ENCOUNTER — MYC REFILL (OUTPATIENT)
Dept: FAMILY MEDICINE | Facility: CLINIC | Age: 38
End: 2018-10-19

## 2018-10-19 DIAGNOSIS — F98.8 ATTENTION DEFICIT DISORDER, UNSPECIFIED HYPERACTIVITY PRESENCE: ICD-10-CM

## 2018-10-19 RX ORDER — DEXTROAMPHETAMINE SACCHARATE, AMPHETAMINE ASPARTATE MONOHYDRATE, DEXTROAMPHETAMINE SULFATE AND AMPHETAMINE SULFATE 5; 5; 5; 5 MG/1; MG/1; MG/1; MG/1
20 CAPSULE, EXTENDED RELEASE ORAL DAILY
Qty: 30 CAPSULE | Refills: 0 | Status: CANCELLED | OUTPATIENT
Start: 2018-10-19

## 2018-10-22 NOTE — TELEPHONE ENCOUNTER
Message from [employer]:   From: ROBERTO ROSA   Sent: Sat Oct 20, 2018 10:07 AM   To: [facility]  Subject: RE: Medication Renewal Request  Sorry.. didn't realize pharmacy still had 2 on file for me! Yunakul, I'm going to be seeing Felicia just have to do the meet & greet.     ----- Message -----  From: Cierra SOLIS  Sent: 10/19/18, 4:10 PM  To: Roberto Rosa  Subject: RE: Medication Renewal Request    Dear Roberto,  Did you  the prescription for the fill for 10/4/18?  Jackelyn Mahajan has left the clinic and is no longer working at [facility].  You will need to establish care with another provider for ongoing medical care and refills of your medication.    Sincerely,  Your [facility] Care Team    ----- Message -----   From: Roberto Rosa   Sent: 10/19/2018 3:57 PM CDT   To: Jackelyn Mahajan PA-C  Subject: Medication Renewal Request    Original authorizing provider: JEREMIE Martin would like a refill of the following medications:  amphetamine-dextroamphetamine (ADDERALL XR) 20 MG per 24 hr capsule [Jackelyn Mahajan PA-C]    Preferred pharmacy: [facility] [address]    Comment:  I can  script

## 2018-12-06 ENCOUNTER — TELEPHONE (OUTPATIENT)
Dept: FAMILY MEDICINE | Facility: OTHER | Age: 38
End: 2018-12-06

## 2018-12-06 ENCOUNTER — OFFICE VISIT (OUTPATIENT)
Dept: INTERNAL MEDICINE | Facility: CLINIC | Age: 38
End: 2018-12-06
Payer: COMMERCIAL

## 2018-12-06 VITALS
TEMPERATURE: 97.2 F | HEART RATE: 98 BPM | WEIGHT: 227.2 LBS | BODY MASS INDEX: 28.78 KG/M2 | DIASTOLIC BLOOD PRESSURE: 102 MMHG | RESPIRATION RATE: 14 BRPM | SYSTOLIC BLOOD PRESSURE: 146 MMHG | OXYGEN SATURATION: 100 %

## 2018-12-06 DIAGNOSIS — N45.1 EPIDIDYMITIS: ICD-10-CM

## 2018-12-06 DIAGNOSIS — I10 BENIGN ESSENTIAL HYPERTENSION: Primary | ICD-10-CM

## 2018-12-06 DIAGNOSIS — Z13.6 CARDIOVASCULAR SCREENING; LDL GOAL LESS THAN 160: Primary | ICD-10-CM

## 2018-12-06 DIAGNOSIS — I10 BENIGN ESSENTIAL HYPERTENSION: ICD-10-CM

## 2018-12-06 LAB
ALBUMIN SERPL-MCNC: 4.6 G/DL (ref 3.4–5)
ALP SERPL-CCNC: 67 U/L (ref 40–150)
ALT SERPL W P-5'-P-CCNC: 52 U/L (ref 0–70)
ANION GAP SERPL CALCULATED.3IONS-SCNC: 11 MMOL/L (ref 3–14)
AST SERPL W P-5'-P-CCNC: 23 U/L (ref 0–45)
BILIRUB SERPL-MCNC: 0.8 MG/DL (ref 0.2–1.3)
BUN SERPL-MCNC: 12 MG/DL (ref 7–30)
CALCIUM SERPL-MCNC: 9.3 MG/DL (ref 8.5–10.1)
CHLORIDE SERPL-SCNC: 100 MMOL/L (ref 94–109)
CO2 SERPL-SCNC: 25 MMOL/L (ref 20–32)
CREAT SERPL-MCNC: 0.93 MG/DL (ref 0.66–1.25)
GFR SERPL CREATININE-BSD FRML MDRD: >90 ML/MIN/1.7M2
GLUCOSE SERPL-MCNC: 99 MG/DL (ref 70–99)
POTASSIUM SERPL-SCNC: 4.1 MMOL/L (ref 3.4–5.3)
PROT SERPL-MCNC: 8.5 G/DL (ref 6.8–8.8)
SODIUM SERPL-SCNC: 136 MMOL/L (ref 133–144)

## 2018-12-06 PROCEDURE — 80053 COMPREHEN METABOLIC PANEL: CPT | Performed by: INTERNAL MEDICINE

## 2018-12-06 PROCEDURE — 99213 OFFICE O/P EST LOW 20 MIN: CPT | Performed by: INTERNAL MEDICINE

## 2018-12-06 PROCEDURE — 36415 COLL VENOUS BLD VENIPUNCTURE: CPT | Performed by: INTERNAL MEDICINE

## 2018-12-06 RX ORDER — IRBESARTAN AND HYDROCHLOROTHIAZIDE 300; 12.5 MG/1; MG/1
1 TABLET, FILM COATED ORAL DAILY
Qty: 30 TABLET | Refills: 0 | Status: SHIPPED | OUTPATIENT
Start: 2018-12-06 | End: 2019-02-07 | Stop reason: ALTCHOICE

## 2018-12-06 RX ORDER — SULFAMETHOXAZOLE/TRIMETHOPRIM 800-160 MG
1 TABLET ORAL 2 TIMES DAILY
Qty: 60 TABLET | Refills: 3 | Status: SHIPPED | OUTPATIENT
Start: 2018-12-06 | End: 2021-03-10

## 2018-12-06 RX ORDER — LISINOPRIL/HYDROCHLOROTHIAZIDE 10-12.5 MG
1 TABLET ORAL DAILY
Qty: 30 TABLET | Refills: 0 | Status: SHIPPED | OUTPATIENT
Start: 2018-12-06 | End: 2018-12-06

## 2018-12-06 ASSESSMENT — PAIN SCALES - GENERAL: PAINLEVEL: NO PAIN (0)

## 2018-12-06 NOTE — TELEPHONE ENCOUNTER
Reason for Call:  Other call back regarding drug interaction    Detailed comments: Pharmacist calling and states there is a possible drug interaction between Lisinopril and Bactrim. Please call back to discuss or send new Rx.     Phone Number Patient can be reached at: [facility] [phone]     Best Time:     Can we leave a detailed message on this number? Not Applicable    Call taken on 12/6/2018 at 3:38 PM by Randi Mcmullen

## 2018-12-06 NOTE — TELEPHONE ENCOUNTER
Patient informed, told to call the pharmacy prior to  to make sure meds are available.    Bertha Hendrickson XRO/  [facility]

## 2018-12-06 NOTE — PROGRESS NOTES
SUBJECTIVE:   Roberto Guillen is a 38 year old male who presents to clinic today for the following health issues:                    Chief Complaint         The patient is a pleasant 38-year-old gentleman who presents today with testicular discomfort.  He has not had this for several days.  Seems to be slowly worse.  He notes that it is more on the right side than the left.  It is reproducible with palpation of a slight nodule at the superior pole of the testicle.  No other discomfort is noted in the testicle he does have some mild inguinal discomfort and even occasionally discomfort in the right flank.  He has had no recent trauma or injury to the testicle.  His past medical history and medications are reviewed.  Does have a history of some ADHD as well as anxiety and some depression.  He notes these are currently controlled.                         PAST, FAMILY,SOCIAL HISTORY:     Medical  History:   has a past medical history of Genital warts (8/12/2010), Lipoma, Moderate recurrent major depression (H) (7/22/2009), and Social phobia (7/22/2009).     Surgical History:   has a past surgical history that includes tonsillectomy (1990).     Social History:   reports that he quit smoking about 6 years ago. He smoked 0.50 packs per day. he has never used smokeless tobacco. He reports that he does not drink alcohol or use drugs.     Family History:  family history includes Depression in his mother and sister; Diabetes in his mother; Hypertension in his mother; Lipids in his father; Prostate Cancer (age of onset: 60) in his father; Psychotic Disorder in his maternal uncle.            MEDICATIONS  Current Outpatient Medications   Medication Sig Dispense Refill     amphetamine-dextroamphetamine (ADDERALL XR) 20 MG per 24 hr capsule Take 1 capsule (20 mg) by mouth daily 30 capsule 0     LORazepam (ATIVAN) 0.5 MG tablet ONE - TWO EVERY 6 HOURS, AS NEEDED FOR ANXIETY. 90 tablet 0     sulfamethoxazole-trimethoprim  (BACTRIM DS/SEPTRA DS) 800-160 MG tablet Take 1 tablet by mouth 2 times daily 60 tablet 3     traZODone (DESYREL) 100 MG tablet Take 1 tablet (100 mg) by mouth At Bedtime for sleep 90 tablet 3     valACYclovir (VALTREX) 1000 mg tablet Take  by mouth. 2 Tabs bid x1 day for cold sore outbreak. 4 tablets for treatment. 28 tablet 2     venlafaxine (EFFEXOR) 37.5 MG tablet One tablet to be taken if extended release Effexor is missed (to help alleviate side effects) 60 tablet 0     venlafaxine (EFFEXOR-XR) 75 MG 24 hr capsule Take 3 capsules (225 mg) by mouth daily 270 capsule 1     irbesartan-hydrochlorothiazide (AVALIDE) 300-12.5 MG tablet Take 1 tablet by mouth daily 30 tablet 0     order for DME Equipment being ordered: blood pressure machine with cuff 1 Device 0         --------------------------------------------------------------------------------------------------------------------                              Review of Systems       LUNGS: Pt denies: cough, excess sputum, hemoptysis, or shortness of breath.   HEART: Pt denies: chest pain, arrhythmia, syncope, tachy or bradyarrhythmia.   GI: Pt denies: nausea, vomiting, diarrhea, constipation, melena, or hematochezia.   NEURO: Pt denies: seizures, strokes, diplopia, weakness, paraesthesias, or paralysis.   SKIN: Pt denies: itching, rashes, discoloration, or specific lesions of concern. Denies recent hair loss.   PSYCH: The patient denies significant depression, anxiety, mood imbalance. Specifically denies any suicidal ideation.  States his medications are working adequately.                                     Examination      BP (!) 146/102 (BP Location: Left arm, Patient Position: Sitting, Cuff Size: Adult Regular)   Pulse 98   Temp 97.2  F (36.2  C) (Temporal)   Resp 14   Wt 103.1 kg (227 lb 3.2 oz)   SpO2 100%   BMI 28.78 kg/m      LUNGS: clear bilaterally, airflow is brisk, no intercostal retraction or stridor is noted. No coughing is noted during  visit.   HEART:  regular without rubs, clicks, gallops, or murmurs. PMI is nondisplaced. Upstrokes are brisk. S1,S2 are heard.   GI: Abdomen is soft, without rebound, guarding or tenderness. Bowel sounds are appropriate. No renal bruits are heard.   NEURO: Pt is alert and appropriate. No neurologic lateralization is noted. Cranial nerves 2-12 are intact. Peripheral sensory and motor function are grossly normal.    SKIN:  warm and dry. No erythema, or rashes are noted. No specific lesions of concern are noted.    PSYCH: The patient appears grossly appropriate. Maintains good eye contact, does not have any jittery or atypical motion. Displays appropriate affect.  BP (!) 146/102 (BP Location: Left arm, Patient Position: Sitting, Cuff Size: Adult Regular)   Pulse 98   Temp 97.2  F (36.2  C) (Temporal)   Resp 14   Wt 103.1 kg (227 lb 3.2 oz)   SpO2 100%   BMI 28.78 kg/m      URINARY: Yoel's punch is negative. No suprapubic tenderness is noted with palpation.   GENITAL: Normal male genitalia is noted. Testicles are bilaterally present. No penile lesions are present.  Evidence of some mild epididymitis on the right is noted.  It is tender and there is some slight swelling to the epididymal head.                                           Decision Making    1. Epididymitis  Recommend doubling up on tighty-whities to help offset pendulous activity  Anticipate antibiotics for 30 days  - sulfamethoxazole-trimethoprim (BACTRIM DS/SEPTRA DS) 800-160 MG tablet; Take 1 tablet by mouth 2 times daily  Dispense: 60 tablet; Refill: 3    2. Benign essential hypertension  We will check renal function.  Suspect to be part of current hypertension is anxiety.  Patient will check blood pressure out of the clinic  - Comprehensive metabolic panel    3. CARDIOVASCULAR SCREENING; LDL GOAL LESS THAN 160  Check lipids for screening purposes   - Lipid Profile (Chol, Trig, HDL, LDL calc); Future                      FOLLOW UP   I have asked  the patient to make an appointment for followup with me in 1 month          I have carefully explained the diagnosis and treatment options to the patient.  The patient has displayed an understanding of the above, and all subsequent questions were answered.      DO RENO Andre    Portions of this note were produced using [employer]  Although every attempt at real-time proof reading has been made, occasional grammar/syntax errors may have been missed.

## 2018-12-06 NOTE — TELEPHONE ENCOUNTER
Reason for Call:  Other call back    Detailed comments: Pharmacy called back and stated that the other medication is in the same drug family as Lisinopril and would interact with the blood pressure medication the patient is on. Please call them back to discuss a new Rx.     Phone Number Patient can be reached at: Other phone number:  [phone]    Best Time: any    Can we leave a detailed message on this number? Not Applicable    Call taken on 12/6/2018 at 4:58 PM by Anna Garg

## 2018-12-07 NOTE — PROGRESS NOTES
Dear Roberto, your recent test results are attached.  The chemistry panel shows a kidney and liver tests to be normal.    Feel free to contact me via the office or My Chart if you have any questions regarding the above.  Sincerely,  DO RENO Andre

## 2019-01-08 ENCOUNTER — MYC REFILL (OUTPATIENT)
Dept: FAMILY MEDICINE | Facility: CLINIC | Age: 39
End: 2019-01-08

## 2019-01-08 DIAGNOSIS — F40.10 SOCIAL PHOBIA: ICD-10-CM

## 2019-01-08 RX ORDER — LORAZEPAM 0.5 MG/1
TABLET ORAL
Qty: 90 TABLET | Refills: 0 | Status: SHIPPED | OUTPATIENT
Start: 2019-01-08 | End: 2019-05-11

## 2019-01-08 NOTE — TELEPHONE ENCOUNTER
I have faxed the script to [facility]. Senait Fabian CMA ([facility])

## 2019-01-08 NOTE — LETTER
[address] [address]  Phone: [phone]  Fax: [phone]        January 9, 2019      Roberto Randy Mindy                                                                                                                                [facility] [address]            Dear Mr. Guillen,    We are concerned about your health care.  We recently provided you with a medication refill.  Many medications require routine follow-up with your Doctor.      At this time we ask that: You schedule an appointment for your annual physica and to establish care with a provider.    Your prescription: Has been refilled for 1 month so you may have time for the above noted follow-up.      Thank you,      [facility] Care Team

## 2019-01-09 NOTE — TELEPHONE ENCOUNTER
2nd attempt to reach pt- left another message. Routing back to team to send letter.  Thank you,  Fabi Gunter- Pt Rep.

## 2019-02-06 ENCOUNTER — MYC MEDICAL ADVICE (OUTPATIENT)
Dept: FAMILY MEDICINE | Facility: OTHER | Age: 39
End: 2019-02-06

## 2019-02-06 DIAGNOSIS — I10 ESSENTIAL HYPERTENSION: Primary | ICD-10-CM

## 2019-02-07 RX ORDER — LISINOPRIL/HYDROCHLOROTHIAZIDE 10-12.5 MG
1 TABLET ORAL DAILY
Qty: 90 TABLET | Refills: 3 | Status: SHIPPED | OUTPATIENT
Start: 2019-02-07 | End: 2019-04-16

## 2019-02-18 ENCOUNTER — MYC REFILL (OUTPATIENT)
Dept: FAMILY MEDICINE | Facility: CLINIC | Age: 39
End: 2019-02-18

## 2019-02-18 DIAGNOSIS — F33.0 MILD RECURRENT MAJOR DEPRESSION (H): ICD-10-CM

## 2019-02-18 DIAGNOSIS — F40.10 SOCIAL PHOBIA: ICD-10-CM

## 2019-02-19 RX ORDER — VENLAFAXINE HYDROCHLORIDE 75 MG/1
225 CAPSULE, EXTENDED RELEASE ORAL DAILY
Qty: 270 CAPSULE | Refills: 1 | Status: SHIPPED | OUTPATIENT
Start: 2019-02-19 | End: 2019-08-15

## 2019-02-19 NOTE — TELEPHONE ENCOUNTER
"Routing refill request to provider for review/approval because:  Pt due for updated PHq-9  Last recorded Bp > goal    Effexor  Last Written Prescription Date:  8/9/2018  Last Fill Quantity: 270,  # refills: 1   Last office visit: 8/17/2018 with prescribing provider:     Future Office Visit:    PHQ-9 SCORE 7/5/2017 1/30/2018 8/9/2018   PHQ-9 Total Score - - -   PHQ-9 Total Score 2 2 1     Requested Prescriptions   Pending Prescriptions Disp Refills     venlafaxine (EFFEXOR-XR) 75 MG 24 hr capsule 270 capsule 1     Sig: Take 3 capsules (225 mg) by mouth daily    Serotonin-Norepinephrine Reuptake Inhibitors  Failed - 2/19/2019 10:07 AM       Failed - Blood pressure under 140/90 in past 12 months    BP Readings from Last 3 Encounters:   12/06/18 (!) 146/102   08/17/18 140/86   08/09/18 (!) 154/104                Failed - PHQ-9 score of less than 5 in past 6 months    Please review last PHQ-9 score.          Failed - Recent (6 mo) or future (30 days) visit within the authorizing provider's specialty    Patient had office visit in the last 6 months or has a visit in the next 30 days with authorizing provider or within the authorizing provider's specialty.  See \"Patient Info\" tab in inbasket, or \"Choose Columns\" in Meds & Orders section of the refill encounter.           Passed - Medication is active on med list       Passed - Patient is age 18 or older       Passed - Normal serum creatinine on file in past 12 months    Recent Labs   Lab Test 12/06/18  1521   CR 0.93               Shaye Hill RN on 2/19/2019 at 10:10 AM    "

## 2019-03-06 ENCOUNTER — MYC REFILL (OUTPATIENT)
Dept: FAMILY MEDICINE | Facility: CLINIC | Age: 39
End: 2019-03-06

## 2019-03-06 DIAGNOSIS — G47.9 SLEEP DISORDER: ICD-10-CM

## 2019-03-06 DIAGNOSIS — F40.10 SOCIAL PHOBIA: ICD-10-CM

## 2019-03-07 ENCOUNTER — MYC REFILL (OUTPATIENT)
Dept: FAMILY MEDICINE | Facility: CLINIC | Age: 39
End: 2019-03-07

## 2019-03-07 DIAGNOSIS — F98.8 ATTENTION DEFICIT DISORDER, UNSPECIFIED HYPERACTIVITY PRESENCE: ICD-10-CM

## 2019-03-07 RX ORDER — DEXTROAMPHETAMINE SACCHARATE, AMPHETAMINE ASPARTATE MONOHYDRATE, DEXTROAMPHETAMINE SULFATE AND AMPHETAMINE SULFATE 5; 5; 5; 5 MG/1; MG/1; MG/1; MG/1
20 CAPSULE, EXTENDED RELEASE ORAL DAILY
Qty: 90 CAPSULE | Refills: 0 | Status: SHIPPED | OUTPATIENT
Start: 2019-03-07 | End: 2019-09-12

## 2019-03-07 RX ORDER — TRAZODONE HYDROCHLORIDE 100 MG/1
100 TABLET ORAL AT BEDTIME
Qty: 90 TABLET | Refills: 3 | OUTPATIENT
Start: 2019-03-07

## 2019-03-07 NOTE — TELEPHONE ENCOUNTER
"Medication denied, refills on file.     Trazodone  Last Written Prescription Date:  08/09/2018  Last Fill Quantity: 90,  # refills: 3   Last office visit: 12/06/2018 with prescribing provider:  Felicia   Future Office Visit:  None    Requested Prescriptions   Pending Prescriptions Disp Refills     traZODone (DESYREL) 100 MG tablet 90 tablet 3     Sig: Take 1 tablet (100 mg) by mouth At Bedtime for sleep    Serotonin Modulators Passed - 3/7/2019  8:31 AM       Passed - Recent (12 mo) or future (30 days) visit within the authorizing provider's specialty    Patient had office visit in the last 12 months or has a visit in the next 30 days with authorizing provider or within the authorizing provider's specialty.  See \"Patient Info\" tab in inbasket, or \"Choose Columns\" in Meds & Orders section of the refill encounter.         Passed - Medication is active on med list       Passed - Patient is age 18 or older      Josey Miller RN   "

## 2019-03-08 NOTE — TELEPHONE ENCOUNTER
Patient has already established care with Dr. Duarte and he will be seeing patient now.     Justa Michael MA

## 2019-04-16 ENCOUNTER — MYC MEDICAL ADVICE (OUTPATIENT)
Dept: FAMILY MEDICINE | Facility: OTHER | Age: 39
End: 2019-04-16

## 2019-04-16 DIAGNOSIS — I10 ESSENTIAL HYPERTENSION: Primary | ICD-10-CM

## 2019-04-16 RX ORDER — AMLODIPINE BESYLATE 10 MG/1
10 TABLET ORAL DAILY
Qty: 30 TABLET | Refills: 0 | Status: SHIPPED | OUTPATIENT
Start: 2019-04-16 | End: 2019-05-11

## 2019-05-11 ENCOUNTER — MYC REFILL (OUTPATIENT)
Dept: FAMILY MEDICINE | Facility: CLINIC | Age: 39
End: 2019-05-11

## 2019-05-11 DIAGNOSIS — F40.10 SOCIAL PHOBIA: ICD-10-CM

## 2019-05-11 DIAGNOSIS — I10 ESSENTIAL HYPERTENSION: ICD-10-CM

## 2019-05-13 RX ORDER — LORAZEPAM 0.5 MG/1
TABLET ORAL
Qty: 90 TABLET | Refills: 0 | Status: SHIPPED | OUTPATIENT
Start: 2019-05-13 | End: 2019-10-11

## 2019-05-13 RX ORDER — AMLODIPINE BESYLATE 10 MG/1
10 TABLET ORAL DAILY
Qty: 30 TABLET | Refills: 0 | Status: SHIPPED | OUTPATIENT
Start: 2019-05-13 | End: 2019-06-10

## 2019-05-13 NOTE — TELEPHONE ENCOUNTER
"Norvasc  Last Written Prescription Date:  04/16/2019  Last Fill Quantity: 30,  # refills: 0   Last office visit: 12/06/2018 with prescribing provider:  Felicia Boone Office Visit:  None  Routing refill request to provider for review/approval because:  Blood pressures elevated above goal.     Ativan  Last Written Prescription Date:  01/08/2019  Last Fill Quantity: 90,  # refills: 0   Last office visit: 12/06/2018 with prescribing provider:  Felicia Boone Office Visit:  None  Routing refill request to provider for review/approval because:  Drug not on the [facility] [place]     Requested Prescriptions   Pending Prescriptions Disp Refills     LORazepam (ATIVAN) 0.5 MG tablet 90 tablet 0     Sig: ONE - TWO EVERY 6 HOURS, AS NEEDED FOR ANXIETY.       There is no refill protocol information for this order        amLODIPine (NORVASC) 10 MG tablet 30 tablet 0     Sig: Take 1 tablet (10 mg) by mouth daily       Calcium Channel Blockers Protocol  Failed - 5/13/2019  8:14 AM        Failed - Blood pressure under 140/90 in past 12 months     BP Readings from Last 3 Encounters:   12/06/18 (!) 146/102   08/17/18 140/86   08/09/18 (!) 154/104           Passed - Recent (12 mo) or future (30 days) visit within the authorizing provider's specialty     Patient had office visit in the last 12 months or has a visit in the next 30 days with authorizing provider or within the authorizing provider's specialty.  See \"Patient Info\" tab in inbasket, or \"Choose Columns\" in Meds & Orders section of the refill encounter.          Passed - Medication is active on med list        Passed - Patient is age 18 or older        Passed - Normal serum creatinine on file in past 12 months     Recent Labs   Lab Test 12/06/18  1521   CR 0.93         Josey Miller RN   "

## 2019-06-10 ENCOUNTER — MYC REFILL (OUTPATIENT)
Dept: FAMILY MEDICINE | Facility: CLINIC | Age: 39
End: 2019-06-10

## 2019-06-10 DIAGNOSIS — I10 ESSENTIAL HYPERTENSION: ICD-10-CM

## 2019-06-10 RX ORDER — AMLODIPINE BESYLATE 10 MG/1
10 TABLET ORAL DAILY
Qty: 30 TABLET | Refills: 0 | Status: SHIPPED | OUTPATIENT
Start: 2019-06-10 | End: 2019-07-09

## 2019-07-09 ENCOUNTER — MYC REFILL (OUTPATIENT)
Dept: FAMILY MEDICINE | Facility: CLINIC | Age: 39
End: 2019-07-09

## 2019-07-09 DIAGNOSIS — I10 ESSENTIAL HYPERTENSION: ICD-10-CM

## 2019-07-09 RX ORDER — AMLODIPINE BESYLATE 10 MG/1
10 TABLET ORAL DAILY
Qty: 30 TABLET | Refills: 0 | Status: SHIPPED | OUTPATIENT
Start: 2019-07-09 | End: 2019-10-30

## 2019-08-15 ENCOUNTER — MYC REFILL (OUTPATIENT)
Dept: FAMILY MEDICINE | Facility: CLINIC | Age: 39
End: 2019-08-15

## 2019-08-15 DIAGNOSIS — F40.10 SOCIAL PHOBIA: ICD-10-CM

## 2019-08-15 DIAGNOSIS — F33.0 MILD RECURRENT MAJOR DEPRESSION (H): ICD-10-CM

## 2019-08-15 RX ORDER — VENLAFAXINE HYDROCHLORIDE 75 MG/1
225 CAPSULE, EXTENDED RELEASE ORAL DAILY
Qty: 270 CAPSULE | Refills: 1 | Status: SHIPPED | OUTPATIENT
Start: 2019-08-15 | End: 2020-02-07

## 2019-08-15 NOTE — TELEPHONE ENCOUNTER
"Requested Prescriptions   Pending Prescriptions Disp Refills     venlafaxine (EFFEXOR-XR) 75 MG 24 hr capsule 270 capsule 1     Sig: Take 3 capsules (225 mg) by mouth daily  Last Written Prescription Date:  02/19/2019  Last Fill Quantity: 270,  # refills: 0   Last office visit: 8/17/2018 with prescribing provider:  02/12/2019   Future Office Visit:           Serotonin-Norepinephrine Reuptake Inhibitors  Failed - 8/15/2019  8:23 AM        Failed - Blood pressure under 140/90 in past 12 months     BP Readings from Last 3 Encounters:   12/06/18 (!) 146/102   08/17/18 140/86   08/09/18 (!) 154/104                 Failed - PHQ-9 score of less than 5 in past 6 months     Please review last PHQ-9 score.           Failed - Recent (6 mo) or future (30 days) visit within the authorizing provider's specialty     Patient had office visit in the last 6 months or has a visit in the next 30 days with authorizing provider or within the authorizing provider's specialty.  See \"Patient Info\" tab in inbasket, or \"Choose Columns\" in Meds & Orders section of the refill encounter.            Passed - Medication is active on med list        Passed - Patient is age 18 or older        Passed - Normal serum creatinine on file in past 12 months     Recent Labs   Lab Test 12/06/18  1521   CR 0.93               "

## 2019-09-12 ENCOUNTER — MYC REFILL (OUTPATIENT)
Dept: FAMILY MEDICINE | Facility: CLINIC | Age: 39
End: 2019-09-12

## 2019-09-12 DIAGNOSIS — F98.8 ATTENTION DEFICIT DISORDER, UNSPECIFIED HYPERACTIVITY PRESENCE: ICD-10-CM

## 2019-09-12 RX ORDER — DEXTROAMPHETAMINE SACCHARATE, AMPHETAMINE ASPARTATE MONOHYDRATE, DEXTROAMPHETAMINE SULFATE AND AMPHETAMINE SULFATE 5; 5; 5; 5 MG/1; MG/1; MG/1; MG/1
20 CAPSULE, EXTENDED RELEASE ORAL DAILY
Qty: 90 CAPSULE | Refills: 0 | Status: SHIPPED | OUTPATIENT
Start: 2019-09-12 | End: 2020-03-23

## 2019-09-12 NOTE — TELEPHONE ENCOUNTER
Adderall      Last Written Prescription Date:  03/07/2019  Last Fill Quantity: 90,   # refills: 0  Last Office Visit: 12/06/2018  Future Office visit:       Routing refill request to provider for review/approval because:  Drug not on the [facility], P or [facility] refill protocol or controlled substance.     Routed to pcp.     Justa Michael MA

## 2019-09-17 DIAGNOSIS — G47.9 SLEEP DISORDER: ICD-10-CM

## 2019-09-17 DIAGNOSIS — F40.10 SOCIAL PHOBIA: ICD-10-CM

## 2019-09-17 RX ORDER — TRAZODONE HYDROCHLORIDE 100 MG/1
100 TABLET ORAL AT BEDTIME
Qty: 90 TABLET | Refills: 3 | Status: SHIPPED | OUTPATIENT
Start: 2019-09-17 | End: 2020-01-15

## 2019-09-27 ENCOUNTER — TELEPHONE (OUTPATIENT)
Dept: FAMILY MEDICINE | Facility: OTHER | Age: 39
End: 2019-09-27

## 2019-09-27 NOTE — TELEPHONE ENCOUNTER
Prior Authorization Retail Medication Request    Medication/Dose: amphetamine-dextroamphetamine (ADDERALL XR) 20 MG 24 hr capsule  ICD code (if different than what is on RX):  Attention deficit disorder, unspecified hyperactivity presence [F98.8]   Previously Tried and Failed:  na  Rationale:  PA renewal needed yearly    Insurance Name:  [facility])  Insurance ID:  [id number]      Pharmacy Information (if different than what is on RX)  Name:  [facility]  [facility]  Phone:  [phone]

## 2019-09-27 NOTE — TELEPHONE ENCOUNTER
Central Prior Authorization Team   Phone: [phone]      Prior Authorization Approval    Authorization Effective Date: 8/28/2019  Authorization Expiration Date: 9/26/2020  Medication: amphetamine-dextroamphetamine (ADDERALL XR) 20 MG 24 hr capsule - APPROVED  Approved Dose/Quantity: 90 FOR 90  Reference #:     Insurance Company: [employer] - Phone [phone] Fax [phone]  Expected CoPay:       CoPay Card Available:      Foundation Assistance Needed:    Which Pharmacy is filling the prescription (Not needed for infusion/clinic administered): [facility] - [place], [place] - [address] [place] [address]  Pharmacy Notified: Yes  Patient Notified: Yes (**Instructed pharmacy to notify patient when script is ready to /ship.**)

## 2019-09-30 ENCOUNTER — HEALTH MAINTENANCE LETTER (OUTPATIENT)
Age: 39
End: 2019-09-30

## 2019-10-11 ENCOUNTER — MYC REFILL (OUTPATIENT)
Dept: FAMILY MEDICINE | Facility: CLINIC | Age: 39
End: 2019-10-11

## 2019-10-11 DIAGNOSIS — F40.10 SOCIAL PHOBIA: ICD-10-CM

## 2019-10-11 RX ORDER — LORAZEPAM 0.5 MG/1
TABLET ORAL
Qty: 90 TABLET | Refills: 0 | Status: SHIPPED | OUTPATIENT
Start: 2019-10-11 | End: 2020-03-23

## 2019-10-11 NOTE — TELEPHONE ENCOUNTER
Ativan      Last Written Prescription Date:  05/13/2019  Last Fill Quantity: 90,   # refills: 0  Last Office Visit: 12/16/2018  Future Office visit:       Routing refill request to provider for review/approval because:  Drug not on the [facility], [facility] or [facility] refill protocol or controlled substance.     Routed to pcp.     Justa Michael MA

## 2019-10-29 ENCOUNTER — HEALTH MAINTENANCE LETTER (OUTPATIENT)
Age: 39
End: 2019-10-29

## 2019-10-30 ENCOUNTER — MYC REFILL (OUTPATIENT)
Dept: FAMILY MEDICINE | Facility: CLINIC | Age: 39
End: 2019-10-30

## 2019-10-30 DIAGNOSIS — I10 ESSENTIAL HYPERTENSION: ICD-10-CM

## 2019-10-30 RX ORDER — AMLODIPINE BESYLATE 10 MG/1
10 TABLET ORAL DAILY
Qty: 30 TABLET | Refills: 0 | Status: SHIPPED | OUTPATIENT
Start: 2019-10-30 | End: 2019-12-03

## 2019-10-30 NOTE — TELEPHONE ENCOUNTER
"Requested Prescriptions   Pending Prescriptions Disp Refills     amLODIPine (NORVASC) 10 MG tablet 30 tablet 0     Sig: Take 1 tablet (10 mg) by mouth daily   Last Written Prescription Date:  07/09/2019  Last Fill Quantity: 30,  # refills: 0   Last office visit: 8/17/2018 with prescribing provider:  12/06/2018   Future Office Visit:        Calcium Channel Blockers Protocol  Failed - 10/30/2019  9:44 AM        Failed - Blood pressure under 140/90 in past 12 months     BP Readings from Last 3 Encounters:   12/06/18 (!) 146/102   08/17/18 140/86   08/09/18 (!) 154/104                 Passed - Recent (12 mo) or future (30 days) visit within the authorizing provider's specialty     Patient has had an office visit with the authorizing provider or a provider within the authorizing providers department within the previous 12 mos or has a future within next 30 days. See \"Patient Info\" tab in inbasket, or \"Choose Columns\" in Meds & Orders section of the refill encounter.              Passed - Medication is active on med list        Passed - Patient is age 18 or older        Passed - Normal serum creatinine on file in past 12 months     Recent Labs   Lab Test 12/06/18  1521   CR 0.93               "

## 2019-12-03 ENCOUNTER — MYC REFILL (OUTPATIENT)
Dept: FAMILY MEDICINE | Facility: CLINIC | Age: 39
End: 2019-12-03

## 2019-12-03 DIAGNOSIS — I10 ESSENTIAL HYPERTENSION: ICD-10-CM

## 2019-12-03 RX ORDER — AMLODIPINE BESYLATE 10 MG/1
10 TABLET ORAL DAILY
Qty: 30 TABLET | Refills: 0 | Status: SHIPPED | OUTPATIENT
Start: 2019-12-03 | End: 2020-01-03

## 2019-12-03 NOTE — TELEPHONE ENCOUNTER
"Requested Prescriptions   Pending Prescriptions Disp Refills     amLODIPine (NORVASC) 10 MG tablet 30 tablet 0     Sig: Take 1 tablet (10 mg) by mouth daily   Last Written Prescription Date:  10/30/2019  Last Fill Quantity: 30,  # refills: 0   Last office visit: 8/17/2018 with prescribing provider:  12/06/2018   Future Office Visit:        Calcium Channel Blockers Protocol  Failed - 12/3/2019 11:51 AM        Failed - Blood pressure under 140/90 in past 12 months     BP Readings from Last 3 Encounters:   12/06/18 (!) 146/102   08/17/18 140/86   08/09/18 (!) 154/104                 Passed - Recent (12 mo) or future (30 days) visit within the authorizing provider's specialty     Patient has had an office visit with the authorizing provider or a provider within the authorizing providers department within the previous 12 mos or has a future within next 30 days. See \"Patient Info\" tab in inbasket, or \"Choose Columns\" in Meds & Orders section of the refill encounter.              Passed - Medication is active on med list        Passed - Patient is age 18 or older        Passed - Normal serum creatinine on file in past 12 months     Recent Labs   Lab Test 12/06/18  1521   CR 0.93               "

## 2020-01-03 ENCOUNTER — MYC REFILL (OUTPATIENT)
Dept: FAMILY MEDICINE | Facility: CLINIC | Age: 40
End: 2020-01-03

## 2020-01-03 DIAGNOSIS — I10 ESSENTIAL HYPERTENSION: ICD-10-CM

## 2020-01-03 RX ORDER — AMLODIPINE BESYLATE 10 MG/1
10 TABLET ORAL DAILY
Qty: 30 TABLET | Refills: 0 | Status: SHIPPED | OUTPATIENT
Start: 2020-01-03 | End: 2020-02-07

## 2020-01-03 NOTE — TELEPHONE ENCOUNTER
"Requested Prescriptions   Pending Prescriptions Disp Refills     amlodipine (NORVASC) 10 MG tablet 30 tablet 0     Sig: Take 1 tablet (10 mg) by mouth daily   Last Written Prescription Date:  12/3/2019  Last Fill Quantity: 30,  # refills: 0   Last office visit: 8/17/2018 with prescribing provider:  08/09/2018   Future Office Visit:        Calcium Channel Blockers Protocol  Failed - 1/3/2020  3:26 AM        Failed - Blood pressure under 140/90 in past 12 months     BP Readings from Last 3 Encounters:   12/06/18 (!) 146/102   08/17/18 140/86   08/09/18 (!) 154/104                 Failed - Recent (12 mo) or future (30 days) visit within the authorizing provider's specialty     Patient has had an office visit with the authorizing provider or a provider within the authorizing providers department within the previous 12 mos or has a future within next 30 days. See \"Patient Info\" tab in inbasket, or \"Choose Columns\" in Meds & Orders section of the refill encounter.              Failed - Normal serum creatinine on file in past 12 months     Recent Labs   Lab Test 12/06/18  1521   CR 0.93             Passed - Medication is active on med list        Passed - Patient is age 18 or older          "

## 2020-01-14 ENCOUNTER — MYC MEDICAL ADVICE (OUTPATIENT)
Dept: FAMILY MEDICINE | Facility: OTHER | Age: 40
End: 2020-01-14

## 2020-01-14 DIAGNOSIS — G47.9 SLEEP DISORDER: ICD-10-CM

## 2020-01-14 DIAGNOSIS — F40.10 SOCIAL PHOBIA: ICD-10-CM

## 2020-01-15 RX ORDER — TRAZODONE HYDROCHLORIDE 100 MG/1
150 TABLET ORAL AT BEDTIME
Qty: 90 TABLET | Refills: 3 | COMMUNITY
Start: 2020-01-15 | End: 2020-01-23

## 2020-01-17 ENCOUNTER — TELEPHONE (OUTPATIENT)
Dept: FAMILY MEDICINE | Facility: OTHER | Age: 40
End: 2020-01-17

## 2020-01-17 NOTE — TELEPHONE ENCOUNTER
Panel Management Review      Patient has the following on his problem list: None      Composite cancer screening  Chart review shows that this patient is due/due soon for the following None  Summary:    Patient is due/failing the following:   BP CHECK and PHQ9    Action needed:   Patient needs to do PHQ9. and Patient needs nurse only appointment.    Type of outreach:    Sent [employer] message.    Questions for provider review:    None                                                                                                                                    Hetal Smith MA     1/17/2020       Chart routed to  .

## 2020-01-23 ENCOUNTER — TELEPHONE (OUTPATIENT)
Dept: FAMILY MEDICINE | Facility: OTHER | Age: 40
End: 2020-01-23

## 2020-01-23 DIAGNOSIS — F40.10 SOCIAL PHOBIA: ICD-10-CM

## 2020-01-23 DIAGNOSIS — G47.9 SLEEP DISORDER: ICD-10-CM

## 2020-01-23 RX ORDER — TRAZODONE HYDROCHLORIDE 100 MG/1
150 TABLET ORAL AT BEDTIME
Qty: 135 TABLET | Refills: 3 | Status: SHIPPED | OUTPATIENT
Start: 2020-01-23 | End: 2021-01-13

## 2020-01-23 RX ORDER — TRAZODONE HYDROCHLORIDE 100 MG/1
150 TABLET ORAL AT BEDTIME
Qty: 90 TABLET | Refills: 3 | Status: SHIPPED | OUTPATIENT
Start: 2020-01-23 | End: 2020-01-23

## 2020-01-23 NOTE — TELEPHONE ENCOUNTER
[facility] calling. Dosage was changed, but not the quantity. Can this be changed so he can get a 90 day supply?   Thank you,  Fabi Gunter- Patient Representative

## 2020-01-23 NOTE — TELEPHONE ENCOUNTER
Trazodone  Last office visit: 12/06/2018 with prescribing provider:  devyn   Future Office Visit:  None    Routing refill request to provider for review/approval because:  Medication is reported/historical  Change dose per [employer] message on 01/14/2020.    Josey Miller RN

## 2020-01-29 ENCOUNTER — OFFICE VISIT (OUTPATIENT)
Dept: URGENT CARE | Facility: RETAIL CLINIC | Age: 40
End: 2020-01-29
Payer: COMMERCIAL

## 2020-01-29 VITALS
OXYGEN SATURATION: 97 % | HEART RATE: 96 BPM | SYSTOLIC BLOOD PRESSURE: 151 MMHG | DIASTOLIC BLOOD PRESSURE: 88 MMHG | TEMPERATURE: 97.7 F

## 2020-01-29 DIAGNOSIS — H10.9 BACTERIAL CONJUNCTIVITIS: Primary | ICD-10-CM

## 2020-01-29 PROCEDURE — 99203 OFFICE O/P NEW LOW 30 MIN: CPT | Performed by: NURSE PRACTITIONER

## 2020-01-29 RX ORDER — OFLOXACIN 3 MG/ML
SOLUTION/ DROPS OPHTHALMIC
Qty: 5 ML | Refills: 0 | Status: SHIPPED | OUTPATIENT
Start: 2020-01-29 | End: 2021-03-10

## 2020-01-29 ASSESSMENT — ENCOUNTER SYMPTOMS
PHOTOPHOBIA: 0
FEVER: 0
SORE THROAT: 0
RHINORRHEA: 0
EYE DISCHARGE: 1
EYE PAIN: 0
COUGH: 0
EYE REDNESS: 1
EYE ITCHING: 1

## 2020-01-29 NOTE — PROGRESS NOTES
Chief Complaint   Patient presents with     Eye Problem     left eye red and draining x 2 days getting worse     SUBJECTIVE:  Roberto Guillen is a 40 year old male who presents complaining of severe left red eye, yellow goopy watery drainage, crusting in the mornings for 2 days. He tried Polytrim with dexamethasone drops from a doctor he works with and that made it worse. He has no significant eye history. No known injury to eyeball.  Patient denies pain, change in vision and light sensitivity.  Onset/timing: gradual, worsening.    Treatment measures tried include: polytrim with dexamethasone drops.  Contact wearer: No.    Past Medical History:   Diagnosis Date     Genital warts 2010     Lipoma     right leg, excised     Moderate recurrent major depression (H) 2009    Dx age  21     Social phobia 2009     amLODIPine (NORVASC) 10 MG tablet, Take 1 tablet (10 mg) by mouth daily  amphetamine-dextroamphetamine (ADDERALL XR) 20 MG 24 hr capsule, Take 1 capsule (20 mg) by mouth daily  LORazepam (ATIVAN) 0.5 MG tablet, ONE - TWO EVERY 6 HOURS, AS NEEDED FOR ANXIETY.  order for DME, Equipment being ordered: blood pressure machine with cuff  sulfamethoxazole-trimethoprim (BACTRIM DS/SEPTRA DS) 800-160 MG tablet, Take 1 tablet by mouth 2 times daily  traZODone (DESYREL) 100 MG tablet, Take 1.5 tablets (150 mg) by mouth At Bedtime for sleep  valACYclovir (VALTREX) 1000 mg tablet, Take  by mouth. 2 Tabs bid x1 day for cold sore outbreak. 4 tablets for treatment.  venlafaxine (EFFEXOR) 37.5 MG tablet, One tablet to be taken if extended release Effexor is missed (to help alleviate side effects)  venlafaxine (EFFEXOR-XR) 75 MG 24 hr capsule, Take 3 capsules (225 mg) by mouth daily    No current facility-administered medications on file prior to visit.     Social History     Tobacco Use     Smoking status: Former Smoker     Packs/day: 0.50     Last attempt to quit: 2013     Years since quittin.0      Smokeless tobacco: Never Used   Substance Use Topics     Alcohol use: No     Comment: treatment in 2006     No Known Allergies    Review of Systems   Constitutional: Negative for fever.   HENT: Negative for congestion, ear pain, rhinorrhea, sneezing and sore throat.    Eyes: Positive for discharge (goopy watery yellow, crusting in the mornings), redness and itching (felt like there was a stye in it on Monday). Negative for photophobia, pain and visual disturbance.   Respiratory: Negative for cough.    Skin: Negative for rash.   Allergic/Immunologic: Negative for environmental allergies.     OBJECTIVE:  BP (!) 151/88   Pulse 96   Temp 97.7  F (36.5  C) (Temporal)   SpO2 97%     Physical Exam  Vitals signs reviewed.   Constitutional:       General: He is not in acute distress.     Appearance: Normal appearance. He is not ill-appearing.   HENT:      Head: Normocephalic and atraumatic.      Nose: No congestion or rhinorrhea.   Eyes:      General:         Left eye: Discharge (watery) present.     Extraocular Movements: Extraocular movements intact.      Pupils: Pupils are equal, round, and reactive to light.      Comments: Severe conjunctival injection scattered, without ciliary flush. Nontender periorbital area. No lesions visualized.   Cardiovascular:      Rate and Rhythm: Normal rate.   Pulmonary:      Effort: Pulmonary effort is normal.   Skin:     General: Skin is warm and dry.   Neurological:      General: No focal deficit present.      Mental Status: He is alert and oriented to person, place, and time.   Psychiatric:         Mood and Affect: Mood normal.         Behavior: Behavior normal.       ASSESSMENT:    ICD-10-CM    1. Bacterial conjunctivitis H10.9 ofloxacin (OCUFLOX) 0.3 % ophthalmic solution     PLAN:   Patient Instructions   Use antibiotic eye drops as directed for 7 days.  Wipe away drainage before administering eye drops.  Wipe discharge away with wet paper towel and then discard.   Discharge should  clear up in 72 hours; redness may continue several more days.  Out of activities for at least 24 hrs due to being contagious.  Infection is spread by contact. Avoid touching eye as much as possible to avoid spreading the infection.  Wash hands regularly and sanitize items touched.  Wash previously used bath, face and hand towels. Do not share towels with others.    Optho urgently if no improvement by tomorrow, eyeball pain, vision change, worsening redness. I am worried about serious conditions such as keratitis / iritis. Patient understands, but prefers trying antibiotic drops for pink eye.    Follow up with primary care provider with any problems, questions or concerns or if symptoms worsen or fail to improve. Patient agreed to plan and verbalized understanding.    Sarah Gordillo, DELMY  [facility] [place]

## 2020-01-29 NOTE — PATIENT INSTRUCTIONS
Use antibiotic eye drops as directed for 7 days.  Wipe away drainage before administering eye drops.  Wipe discharge away with wet paper towel and then discard.   Discharge should clear up in 72 hours; redness may continue several more days.  Out of activities for at least 24 hrs due to being contagious.  Infection is spread by contact. Avoid touching eye as much as possible to avoid spreading the infection.  Wash hands regularly and sanitize items touched.  Wash previously used bath, face and hand towels. Do not share towels with others.    Optho urgently if no improvement by tomorrow, eyeball pain, vision change, worsening redness. I am worried about serious conditions such as keratitis / iritis. Patient understands, but prefers trying antibiotic drops for pink eye.

## 2020-02-07 ENCOUNTER — MYC REFILL (OUTPATIENT)
Dept: FAMILY MEDICINE | Facility: CLINIC | Age: 40
End: 2020-02-07

## 2020-02-07 DIAGNOSIS — F40.10 SOCIAL PHOBIA: ICD-10-CM

## 2020-02-07 DIAGNOSIS — I10 ESSENTIAL HYPERTENSION: ICD-10-CM

## 2020-02-07 DIAGNOSIS — F33.0 MILD RECURRENT MAJOR DEPRESSION (H): ICD-10-CM

## 2020-02-07 NOTE — TELEPHONE ENCOUNTER
"Requested Prescriptions   Pending Prescriptions Disp Refills     venlafaxine (EFFEXOR-XR) 75 MG 24 hr capsule 270 capsule 1     Sig: Take 3 capsules (225 mg) by mouth daily   Last Written Prescription Date:  08/15/2019  Last Fill Quantity: 270,  # refills: 1   Last office visit: 8/17/2018 with prescribing provider:  12/06/2018   Future Office Visit:        Serotonin-Norepinephrine Reuptake Inhibitors  Failed - 2/7/2020  8:26 AM        Failed - Blood pressure under 140/90 in past 12 months     BP Readings from Last 3 Encounters:   01/29/20 (!) 151/88   12/06/18 (!) 146/102 08/17/18 140/86                 Failed - PHQ-9 score of less than 5 in past 6 months     Please review last PHQ-9 score.           Failed - Normal serum creatinine on file in past 12 months     Recent Labs   Lab Test 12/06/18  1521   CR 0.93             Failed - Recent (6 mo) or future (30 days) visit within the authorizing provider's specialty     Patient had office visit in the last 6 months or has a visit in the next 30 days with authorizing provider or within the authorizing provider's specialty.  See \"Patient Info\" tab in inbasket, or \"Choose Columns\" in Meds & Orders section of the refill encounter.            Passed - Medication is active on med list        Passed - Patient is age 18 or older        amLODIPine (NORVASC) 10 MG tablet 30 tablet 0     Sig: Take 1 tablet (10 mg) by mouth daily   Last Written Prescription Date:  01/03/2020  Last Fill Quantity: 30,  # refills: 0   Last office visit: 8/17/2018 with prescribing provider:  12/06/2018   Future Office Visit:        Calcium Channel Blockers Protocol  Failed - 2/7/2020  8:26 AM        Failed - Blood pressure under 140/90 in past 12 months     BP Readings from Last 3 Encounters:   01/29/20 (!) 151/88   12/06/18 (!) 146/102   08/17/18 140/86                 Failed - Recent (12 mo) or future (30 days) visit within the authorizing provider's specialty     Patient has had an office visit " "with the authorizing provider or a provider within the authorizing providers department within the previous 12 mos or has a future within next 30 days. See \"Patient Info\" tab in inbasket, or \"Choose Columns\" in Meds & Orders section of the refill encounter.              Failed - Normal serum creatinine on file in past 12 months     Recent Labs   Lab Test 12/06/18  1521   CR 0.93             Passed - Medication is active on med list        Passed - Patient is age 18 or older          "

## 2020-02-10 RX ORDER — AMLODIPINE BESYLATE 10 MG/1
10 TABLET ORAL DAILY
Qty: 30 TABLET | Refills: 0 | Status: SHIPPED | OUTPATIENT
Start: 2020-02-10 | End: 2020-03-05

## 2020-02-10 RX ORDER — VENLAFAXINE HYDROCHLORIDE 75 MG/1
225 CAPSULE, EXTENDED RELEASE ORAL DAILY
Qty: 270 CAPSULE | Refills: 1 | OUTPATIENT
Start: 2020-02-10

## 2020-02-10 RX ORDER — VENLAFAXINE HYDROCHLORIDE 75 MG/1
225 CAPSULE, EXTENDED RELEASE ORAL DAILY
Qty: 270 CAPSULE | Refills: 1 | Status: SHIPPED | OUTPATIENT
Start: 2020-02-10 | End: 2020-03-23

## 2020-02-10 RX ORDER — AMLODIPINE BESYLATE 10 MG/1
10 TABLET ORAL DAILY
Qty: 30 TABLET | Refills: 0 | OUTPATIENT
Start: 2020-02-10

## 2020-02-10 NOTE — TELEPHONE ENCOUNTER
Prescription was sent 2/10/20 for #30 with 0 refills.  Pharmacy notified via E-Prescribe refusal.     Megha Browning RN on 2/10/2020 at 10:35 AM

## 2020-02-10 NOTE — TELEPHONE ENCOUNTER
"Requested Prescriptions   Pending Prescriptions Disp Refills     venlafaxine (EFFEXOR-XR) 75 MG 24 hr capsule [Pharmacy Med Name: venlafaxine ER 75 mg capsule,extended release 24 hr (EFFEXOR XR)] 270 capsule 1     Sig: Take 3 capsules (225 mg) by mouth daily   Last Written Prescription Date:  2/10/20  Last Fill Quantity: 270,  # refills: 1   Last office visit: 8/9/18 [place]  Future Office Visit:        Serotonin-Norepinephrine Reuptake Inhibitors  Failed - 2/7/2020  5:50 PM        Failed - Blood pressure under 140/90 in past 12 months     BP Readings from Last 3 Encounters:   01/29/20 (!) 151/88   12/06/18 (!) 146/102   08/17/18 140/86           Failed - PHQ-9 score of less than 5 in past 6 months     Please review last PHQ-9 score.   PHQ-9 score:    PHQ-9 SCORE 8/9/2018   PHQ-9 Total Score -   PHQ-9 Total Score 1           Failed - Normal serum creatinine on file in past 12 months     Recent Labs   Lab Test 12/06/18  1521   CR 0.93           Failed - Recent (6 mo) or future (30 days) visit within the authorizing provider's specialty     Patient had office visit in the last 6 months or has a visit in the next 30 days with authorizing provider or within the authorizing provider's specialty.  See \"Patient Info\" tab in inbasket, or \"Choose Columns\" in Meds & Orders section of the refill encounter.            Passed - Medication is active on med list        Passed - Patient is age 18 or older        amLODIPine (NORVASC) 10 MG tablet [Pharmacy Med Name: amLODIPine 10 mg tablet (NORVASC)] 30 tablet 0     Sig: Take 1 tablet (10 mg) by mouth daily   Last Written Prescription Date:  2/10/20  Last Fill Quantity: 30,  # refills: 0   Last office visit: 8/9/2018 with prescribing provider:  Keyshawn Boone Office Visit:        Calcium Channel Blockers Protocol  Failed - 2/7/2020  5:50 PM        Failed - Blood pressure under 140/90 in past 12 months     BP Readings from Last 3 Encounters:   01/29/20 (!) 151/88   12/06/18 (!) " "146/102   08/17/18 140/86           Failed - Recent (12 mo) or future (30 days) visit within the authorizing provider's specialty     Patient has had an office visit with the authorizing provider or a provider within the authorizing providers department within the previous 12 mos or has a future within next 30 days. See \"Patient Info\" tab in inbasket, or \"Choose Columns\" in Meds & Orders section of the refill encounter.              Failed - Normal serum creatinine on file in past 12 months     Recent Labs   Lab Test 12/06/18  1521   CR 0.93           Passed - Medication is active on med list        Passed - Patient is age 18 or older          "

## 2020-03-05 ENCOUNTER — MYC REFILL (OUTPATIENT)
Dept: FAMILY MEDICINE | Facility: CLINIC | Age: 40
End: 2020-03-05

## 2020-03-05 DIAGNOSIS — I10 ESSENTIAL HYPERTENSION: ICD-10-CM

## 2020-03-05 RX ORDER — AMLODIPINE BESYLATE 10 MG/1
10 TABLET ORAL DAILY
Qty: 30 TABLET | Refills: 0 | Status: SHIPPED | OUTPATIENT
Start: 2020-03-05 | End: 2020-03-23

## 2020-03-23 ENCOUNTER — MYC REFILL (OUTPATIENT)
Dept: FAMILY MEDICINE | Facility: CLINIC | Age: 40
End: 2020-03-23

## 2020-03-23 DIAGNOSIS — F98.8 ATTENTION DEFICIT DISORDER, UNSPECIFIED HYPERACTIVITY PRESENCE: ICD-10-CM

## 2020-03-23 DIAGNOSIS — I10 ESSENTIAL HYPERTENSION: ICD-10-CM

## 2020-03-23 DIAGNOSIS — F40.10 SOCIAL PHOBIA: ICD-10-CM

## 2020-03-23 DIAGNOSIS — F33.0 MILD RECURRENT MAJOR DEPRESSION (H): ICD-10-CM

## 2020-03-23 RX ORDER — VENLAFAXINE HYDROCHLORIDE 75 MG/1
225 CAPSULE, EXTENDED RELEASE ORAL DAILY
Qty: 270 CAPSULE | Refills: 1 | Status: SHIPPED | OUTPATIENT
Start: 2020-03-23 | End: 2020-08-03

## 2020-03-23 RX ORDER — DEXTROAMPHETAMINE SACCHARATE, AMPHETAMINE ASPARTATE MONOHYDRATE, DEXTROAMPHETAMINE SULFATE AND AMPHETAMINE SULFATE 5; 5; 5; 5 MG/1; MG/1; MG/1; MG/1
20 CAPSULE, EXTENDED RELEASE ORAL DAILY
Qty: 90 CAPSULE | Refills: 0 | Status: SHIPPED | OUTPATIENT
Start: 2020-03-23 | End: 2021-01-05

## 2020-03-23 RX ORDER — LORAZEPAM 0.5 MG/1
TABLET ORAL
Qty: 90 TABLET | Refills: 0 | Status: SHIPPED | OUTPATIENT
Start: 2020-03-23 | End: 2021-03-25

## 2020-03-23 RX ORDER — AMLODIPINE BESYLATE 10 MG/1
10 TABLET ORAL DAILY
Qty: 30 TABLET | Refills: 0 | Status: SHIPPED | OUTPATIENT
Start: 2020-03-23 | End: 2020-05-05

## 2020-03-23 NOTE — TELEPHONE ENCOUNTER
"Requested Prescriptions   Pending Prescriptions Disp Refills     amphetamine-dextroamphetamine (ADDERALL XR) 20 MG 24 hr capsule 90 capsule 0     Sig: Take 1 capsule (20 mg) by mouth daily       Last Written Prescription Date:  09/12/2019  Last Fill Quantity: 90,   # refills: 0  Last Office Visit: 12/06/2018  Future Office visit:       Routing refill request to provider for review/approval because:  Drug not on the [facility], [facility] or  Health refill protocol or controlled substance              LORazepam (ATIVAN) 0.5 MG tablet 90 tablet 0     Sig: ONE - TWO EVERY 6 HOURS, AS NEEDED FOR ANXIETY.      Last Written Prescription Date:  10/11/2019  Last Fill Quantity: 90,   # refills: 0  Last Office Visit: 12/06/2018  Future Office visit:       Routing refill request to provider for review/approval because:  Drug not on the [facility], [facility] or [facility] refill protocol or controlled substance              venlafaxine (EFFEXOR-XR) 75 MG 24 hr capsule 270 capsule 1     Sig: Take 3 capsules (225 mg) by mouth daily   Last Written Prescription Date:  02/10/2020  Last Fill Quantity: 270,  # refills: 1   Last office visit: 8/17/2018 with prescribing provider:  12/06/2018   Future Office Visit:        Serotonin-Norepinephrine Reuptake Inhibitors  Failed - 3/23/2020 12:50 PM        Failed - Blood pressure under 140/90 in past 12 months     BP Readings from Last 3 Encounters:   01/29/20 (!) 151/88   12/06/18 (!) 146/102   08/17/18 140/86                 Failed - Normal serum creatinine on file in past 12 months     Recent Labs   Lab Test 12/06/18  1521   CR 0.93       Ok to refill medication if creatinine is low          Failed - Recent (6 mo) or future (30 days) visit within the authorizing provider's specialty     Patient had office visit in the last 6 months or has a visit in the next 30 days with authorizing provider or within the authorizing provider's specialty.  See \"Patient Info\" tab in inbasket, or \"Choose Columns\" in Meds & Orders " "section of the refill encounter.            Passed - PHQ-9 score of less than 5 in past 6 months     Please review last PHQ-9 score.           Passed - Medication is active on med list        Passed - Patient is age 18 or older           amLODIPine (NORVASC) 10 MG tablet 30 tablet 0     Sig: Take 1 tablet (10 mg) by mouth daily   Last Written Prescription Date:  03/05/2020  Last Fill Quantity: 30,  # refills: 0   Last office visit: 8/17/2018 with prescribing provider:  12/06/2018   Future Office Visit:        Calcium Channel Blockers Protocol  Failed - 3/23/2020 12:50 PM        Failed - Blood pressure under 140/90 in past 12 months     BP Readings from Last 3 Encounters:   01/29/20 (!) 151/88   12/06/18 (!) 146/102   08/17/18 140/86                 Failed - Recent (12 mo) or future (30 days) visit within the authorizing provider's specialty     Patient has had an office visit with the authorizing provider or a provider within the authorizing providers department within the previous 12 mos or has a future within next 30 days. See \"Patient Info\" tab in inbasket, or \"Choose Columns\" in Meds & Orders section of the refill encounter.              Failed - Normal serum creatinine on file in past 12 months     Recent Labs   Lab Test 12/06/18  1521   CR 0.93       Ok to refill medication if creatinine is low          Passed - Medication is active on med list        Passed - Patient is age 18 or older             "

## 2020-03-26 ENCOUNTER — MYC REFILL (OUTPATIENT)
Dept: FAMILY MEDICINE | Facility: CLINIC | Age: 40
End: 2020-03-26

## 2020-03-26 DIAGNOSIS — B00.1 COLD SORE: ICD-10-CM

## 2020-03-26 RX ORDER — VALACYCLOVIR HYDROCHLORIDE 1 G/1
TABLET, FILM COATED ORAL
Qty: 28 TABLET | Refills: 2 | Status: SHIPPED | OUTPATIENT
Start: 2020-03-26 | End: 2023-01-24

## 2020-03-26 NOTE — TELEPHONE ENCOUNTER
"Valtrex  Last Written Prescription Date:  08/09/2018  Last Fill Quantity: 28,  # refills: 2   Last office visit: 8/17/2018 with prescribing provider:  Keyshawn   Future Office Visit:  None  Routing refill request to provider for review/approval because:  Labs not current:  Creatinine.     Requested Prescriptions   Pending Prescriptions Disp Refills     valACYclovir (VALTREX) 1000 mg tablet 28 tablet 2     Sig: Take  by mouth. 2 Tabs bid x1 day for cold sore outbreak. 4 tablets for treatment.       Antivirals for Herpes Protocol Failed - 3/26/2020  9:37 AM        Failed - Recent (12 mo) or future (30 days) visit within the authorizing provider's specialty     Patient has had an office visit with the authorizing provider or a provider within the authorizing providers department within the previous 12 mos or has a future within next 30 days. See \"Patient Info\" tab in inbasket, or \"Choose Columns\" in Meds & Orders section of the refill encounter.          Failed - Normal serum creatinine on file in past 12 months     Recent Labs   Lab Test 12/06/18  1521   CR 0.93     Ok to refill medication if creatinine is low        Passed - Patient is age 12 or older        Passed - Medication is active on med list         Josey Miller RN   "

## 2020-04-02 ENCOUNTER — E-VISIT (OUTPATIENT)
Dept: FAMILY MEDICINE | Facility: OTHER | Age: 40
End: 2020-04-02
Payer: COMMERCIAL

## 2020-04-02 DIAGNOSIS — F33.0 MILD RECURRENT MAJOR DEPRESSION (H): ICD-10-CM

## 2020-04-02 DIAGNOSIS — F40.10 SOCIAL PHOBIA: ICD-10-CM

## 2020-04-02 DIAGNOSIS — N52.2 DRUG-INDUCED ERECTILE DYSFUNCTION: ICD-10-CM

## 2020-04-02 DIAGNOSIS — F98.8 ATTENTION DEFICIT DISORDER, UNSPECIFIED HYPERACTIVITY PRESENCE: Primary | ICD-10-CM

## 2020-04-02 DIAGNOSIS — I10 ESSENTIAL HYPERTENSION: ICD-10-CM

## 2020-04-02 PROCEDURE — 99421 OL DIG E/M SVC 5-10 MIN: CPT | Performed by: INTERNAL MEDICINE

## 2020-04-03 RX ORDER — SILDENAFIL 100 MG/1
50-100 TABLET, FILM COATED ORAL DAILY PRN
Qty: 10 TABLET | Refills: 3 | Status: SHIPPED | OUTPATIENT
Start: 2020-04-03 | End: 2020-06-05

## 2020-04-24 ENCOUNTER — TELEPHONE (OUTPATIENT)
Dept: FAMILY MEDICINE | Facility: OTHER | Age: 40
End: 2020-04-24

## 2020-04-24 NOTE — TELEPHONE ENCOUNTER
Prior Authorization Retail Medication Request    Medication/Dose: sildenafil (VIAGRA) 100 MG tablet   ICD code (if different than what is on RX):  Drug-induced erectile dysfunction [N52.2]   Previously Tried and Failed:  na  Rationale:  na    Insurance Name:  [employer]  Insurance ID:  [id number]      Pharmacy Information (if different than what is on RX)  Name:  [id number]  Phone:  [phone]

## 2020-04-27 NOTE — TELEPHONE ENCOUNTER
PA Initiation    Medication: sildenafil (VIAGRA) 100 MG tablet - INITIATED  Insurance Company: [employer] - Phone [phone] Fax [phone]  Pharmacy Filling the Rx: [facility] - [address]  Filling Pharmacy Phone:    Filling Pharmacy Fax:    Start Date: 4/27/2020

## 2020-04-29 NOTE — TELEPHONE ENCOUNTER
Prior Authorization Approval    Authorization Effective Date: 3/30/2020  Authorization Expiration Date: 4/29/2021  Medication: sildenafil (VIAGRA) 100 MG tablet - APPROVED  Approved Dose/Quantity: 10 TABLETS PER 30 DAYS  Reference #: PA Case ID: [id number]   Insurance Company: [employer] - Phone [phone] Fax [phone]  Expected CoPay:       CoPay Card Available:      Foundation Assistance Needed:    Which Pharmacy is filling the prescription (Not needed for infusion/clinic administered): [facility] - [address]  Pharmacy Notified: Yes  Patient Notified: Yes    FAX TO COME

## 2020-05-05 ENCOUNTER — MYC REFILL (OUTPATIENT)
Dept: FAMILY MEDICINE | Facility: CLINIC | Age: 40
End: 2020-05-05

## 2020-05-05 DIAGNOSIS — I10 ESSENTIAL HYPERTENSION: ICD-10-CM

## 2020-05-05 RX ORDER — AMLODIPINE BESYLATE 10 MG/1
10 TABLET ORAL DAILY
Qty: 30 TABLET | Refills: 0 | Status: SHIPPED | OUTPATIENT
Start: 2020-05-05 | End: 2020-06-03

## 2020-05-06 RX ORDER — AMLODIPINE BESYLATE 10 MG/1
10 TABLET ORAL DAILY
Qty: 30 TABLET | Refills: 0 | OUTPATIENT
Start: 2020-05-06

## 2020-05-10 ENCOUNTER — E-VISIT (OUTPATIENT)
Dept: FAMILY MEDICINE | Facility: OTHER | Age: 40
End: 2020-05-10
Payer: COMMERCIAL
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
## 2020-05-11 RX ORDER — AMOXICILLIN AND CLAVULANATE POTASSIUM 500; 125 MG/1; MG/1
1 TABLET, FILM COATED ORAL 2 TIMES DAILY
Qty: 14 TABLET | Refills: 0 | Status: SHIPPED | OUTPATIENT
Start: 2020-05-11 | End: 2021-03-10

## 2020-05-11 RX ORDER — AMOXICILLIN AND CLAVULANATE POTASSIUM 500; 125 MG/1; MG/1
1 TABLET, FILM COATED ORAL 2 TIMES DAILY
Qty: 14 TABLET | Refills: 0 | Status: SHIPPED | OUTPATIENT
Start: 2020-05-11 | End: 2020-05-11

## 2020-06-03 ENCOUNTER — MYC REFILL (OUTPATIENT)
Dept: FAMILY MEDICINE | Facility: CLINIC | Age: 40
End: 2020-06-03

## 2020-06-03 DIAGNOSIS — I10 ESSENTIAL HYPERTENSION: ICD-10-CM

## 2020-06-05 ENCOUNTER — MYC REFILL (OUTPATIENT)
Dept: FAMILY MEDICINE | Facility: OTHER | Age: 40
End: 2020-06-05

## 2020-06-05 DIAGNOSIS — N52.2 DRUG-INDUCED ERECTILE DYSFUNCTION: ICD-10-CM

## 2020-06-05 RX ORDER — AMLODIPINE BESYLATE 10 MG/1
10 TABLET ORAL DAILY
Qty: 30 TABLET | Refills: 0 | Status: SHIPPED | OUTPATIENT
Start: 2020-06-05 | End: 2020-07-03

## 2020-06-05 RX ORDER — SILDENAFIL 100 MG/1
50-100 TABLET, FILM COATED ORAL DAILY PRN
Qty: 10 TABLET | Refills: 3 | Status: SHIPPED | OUTPATIENT
Start: 2020-06-05 | End: 2020-06-25

## 2020-06-25 DIAGNOSIS — N52.2 DRUG-INDUCED ERECTILE DYSFUNCTION: ICD-10-CM

## 2020-06-26 RX ORDER — SILDENAFIL 100 MG/1
50-100 TABLET, FILM COATED ORAL DAILY PRN
Qty: 10 TABLET | Refills: 11 | Status: SHIPPED | OUTPATIENT
Start: 2020-06-26 | End: 2021-04-19

## 2020-06-28 ENCOUNTER — VIRTUAL VISIT (OUTPATIENT)
Dept: FAMILY MEDICINE | Facility: OTHER | Age: 40
End: 2020-06-28

## 2020-06-28 ENCOUNTER — NURSE TRIAGE (OUTPATIENT)
Dept: NURSING | Facility: CLINIC | Age: 40
End: 2020-06-28

## 2020-06-28 NOTE — PROGRESS NOTES
"Date: 2020 18:19:20  Clinician: Bonnie Castillo  Clinician NPI: [id number]  Patient: Roberto Guillen  Patient : 1980  Patient Address: [address] [place] [address]  Patient Phone: [phone]  Visit Protocol: URI  Patient Summary:  Roberto is a 40 year old ( : 1980 ) male who initiated a Visit for COVID-19 (Coronavirus) evaluation and screening. When asked the question \"Please sign me up to receive news, health information and promotions from [employer].\", Roberto responded \"No\".    Roberto states his symptoms started gradually 3-4 days ago.   His symptoms consist of tooth pain, diarrhea, malaise, and facial pain or pressure.   Symptom details     Facial pain or pressure: The facial pain or pressure does not feel worse when bending or leaning forward.     Tooth pain: The tooth pain is not caused by a cavity, recent dental work, or other mouth problems.      Roberto denies having wheezing, nausea, ageusia, vomiting, rhinitis, ear pain, headache, chills, sore throat, enlarged lymph nodes, myalgias, anosmia, fever, cough, and nasal congestion. He also denies having recent facial or sinus surgery in the past 60 days, taking antibiotic medication in the past month, and double sickening (worsening symptoms after initial improvement). He is not experiencing dyspnea.    Pertinent COVID-19 (Coronavirus) information  In the past 14 days, Roberto has not worked in a congregate living setting.   He either works or volunteers as a healthcare worker or a , or works or volunteers in a healthcare facility. 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 He lives with a healthcare worker.   Roberto has not had a close contact with a laboratory-confirmed COVID-19 patient within 14 days of symptom " onset.   Pertinent medical history  Roberto needs a return to work/school note.   Weight: 230 lbs   Roberto smokes or uses smokeless tobacco.   Weight: 230 lbs    MEDICATIONS: trazodone oral, Norvasc oral, Effexor XR oral, ALLERGIES: NKDA  Clinician Response:  Dear Roberto,  I am sorry you are not feeling well. Your health is our priority. Based on the information you have provided, we understand that you have COVID-19 (Coronavirus) concerns.  You will not be charged for this Visit.&nbsp;Thank you for trusting us with our care.  COVID-19 (Coronavirus) General Information  Because there is currently no vaccine to prevent infection, the best way to protect yourself is to avoid being exposed to this virus. Common symptoms of COVID-19 include but are not limited to fever, cough, and shortness of breath. These symptoms appear 2-14 days after you are exposed to the virus that causes COVID-19. Click here for more information from the CDC on how to protect yourself.  If you are sick with COVID-19 or suspect you are infected with the virus that causes COVID-19, follow the steps here from the CDC to help prevent the disease from spreading to people in your home and community.  Click here for general information from the CDC on testing.  If you develop any of these emergency warning signs for COVID-19, get medical attention immediately:     Trouble breathing    Persistent pain or pressure in the chest    New confusion or inability to arouse    Bluish lips or face      Call your doctor or clinic before going in. Call 911 if you have a medical emergency and notify the  you have or think you may have COVID-19.  For more detailed and up to date information on COVID-19 (Coronavirus), please visit the CDC website.   Diagnosis: Refer for additional evaluation - COVID-19 (Coronavirus) concern  Diagnosis ICD: R69

## 2020-06-28 NOTE — TELEPHONE ENCOUNTER
Patient reports that he has loose stools x 3 days. Extreme fatigue.     No fever, cough, chest tightness or SOB.   Nausea.  No abdominal pain.     6 loose stools today. 6 or more stools yesterday.     Advised see PCP within 24 hours per protocol. Warm transfer to scheduling.     Mariam Self RN/[facility]    COVID 19 Nurse Triage Plan/Patient Instructions    Please be aware that novel coronavirus (COVID-19) may be circulating in the community. If you develop symptoms such as fever, cough, or SOB or if you have concerns about the presence of another infection including coronavirus (COVID-19), please contact your health care provider or visit [email or web address].     Disposition/Instructions    Patient to schedule an In Person Visit with provider. Reference Visit Selection Guide.    Thank you for taking steps to prevent the spread of this virus.  o Limit your contact with others.  o Wear a simple mask to cover your cough.  o Wash your hands well and often.    Resources    [facility]: About COVID-19: [email or web address]    CDC: What to Do If You're Sick: www.cdc.gov/coronavirus/2019-ncov/about/steps-when-sick.html    CDC: Ending Home Isolation: www.cdc.gov/coronavirus/2019-ncov/hcp/disposition-in-home-patients.html     CDC: Caring for Someone: www.cdc.gov/coronavirus/2019-ncov/if-you-are-sick/care-for-someone.html     [facility]: Interim Guidance for Hospital Discharge to Home: www.health.[facility].mn.us/diseases/coronavirus/hcp/hospdischarge.pdf    [facility] clinical trials (COVID-19 research studies): clinicalaffairs.[facility]/n-clinical-trials     Below are the COVID-19 hotlines at the [place] Department of [facility] ([facility]). Interpreters are available.   o For health questions: Call 472-099-3190 or 1-637.667.5633 (7 a.m. to 7 p.m.)  o For questions about schools and childcare: Call 942-603-0002 or 1-376.487.9813 (7 a.m. to 7 p.m.)     Additional Information    Negative: Shock  suspected (e.g., cold/pale/clammy skin, too weak to stand, low BP, rapid pulse)    Negative: Difficult to awaken or acting confused (e.g., disoriented, slurred speech)    Negative: Sounds like a life-threatening emergency to the triager    Negative: Vomiting also present and worse than the diarrhea    Negative: [1] Blood in stool AND [2] without diarrhea    Negative: Diarrhea in a cancer patient who is currently (or recently) receiving chemotherapy or radiation therapy, or cancer patient who has metastatic or end-stage cancer and is receiving palliative care    Negative: [1] SEVERE abdominal pain (e.g., excruciating) AND [2] present > 1 hour    Negative: [1] SEVERE abdominal pain AND [2] age > 60    Negative: [1] Blood in the stool AND [2] moderate or large amount of blood    Negative: Black or tarry bowel movements  (Exception: chronic-unchanged  black-grey bowel movements AND is taking iron pills or Pepto-bismol)    Negative: [1] Drinking very little AND [2] dehydration suspected (e.g., no urine > 12 hours, very dry mouth, very lightheaded)    Negative: Patient sounds very sick or weak to the triager    Negative: [1] SEVERE diarrhea (e.g., 7 or more times / day more than normal) AND [2] age > 60 years    Negative: [1] Constant abdominal pain AND [2] present > 2 hours    Negative: [1] Fever > 103 F (39.4 C) AND [2] not able to get the fever down using Fever Care Advice    Negative: [1] SEVERE diarrhea (e.g., 7 or more times / day more than normal) AND [2] present > 24 hours (1 day)    [1] MODERATE diarrhea (e.g., 4-6 times / day more than normal) AND [2] present > 48 hours (2 days)    Protocols used: DIARRHEA-A-

## 2020-06-29 ENCOUNTER — VIRTUAL VISIT (OUTPATIENT)
Dept: FAMILY MEDICINE | Facility: CLINIC | Age: 40
End: 2020-06-29
Payer: COMMERCIAL

## 2020-06-29 DIAGNOSIS — R19.7 DIARRHEA OF PRESUMED INFECTIOUS ORIGIN: Primary | ICD-10-CM

## 2020-06-29 PROCEDURE — 99213 OFFICE O/P EST LOW 20 MIN: CPT | Mod: 95 | Performed by: FAMILY MEDICINE

## 2020-06-29 NOTE — PROGRESS NOTES
"Roberto Guillen is a 40 year old male who is being evaluated via a billable telephone visit.      Telephone visit performed in lieu of an in-person visit due to current concerns regarding the current COVID-19 situation including social distancing and keeping at risk people safe.  Patient agreed to proceed with this visit due to these circumstances.     The patient has been notified of following:     \"This telephone visit will be conducted via a call between you and your physician/provider. We have found that certain health care needs can be provided without the need for a physical exam.  This service lets us provide the care you need with a short phone conversation.  If a prescription is necessary we can send it directly to your pharmacy.  If lab work is needed we can place an order for that and you can then stop by our lab to have the test done at a later time.    Telephone visits are billed at different rates depending on your insurance coverage. During this emergency period, for some insurers they may be billed the same as an in-person visit.  Please reach out to your insurance provider with any questions.    If during the course of the call the physician/provider feels a telephone visit is not appropriate, you will not be charged for this service.\"    Patient has given verbal consent for Telephone visit?  Yes    What phone number would you like to be contacted at? [phone]    How would you like to obtain your AVS? Jane Shrestha     Roberto Guillen is a 40 year old male who presents via phone visit today for the following health issues:    HPI  Diarrhea      Duration: x 4 days    Description:       Consistency of stool: watery and loose       Blood in stool: no        Number of loose stools past 24 hours: 6-7    Intensity:  moderate    Accompanying signs and symptoms:       Fever: no        Nausea/vomitting: YES- nausea       Abdominal pain: YES- discomfort       Weight loss: no "     History (recent antibiotics or travel/ill contacts/med changes/testing done): none    Precipitating or alleviating factors: None    Therapies tried and outcome: none    He [place] a casino last [place], and 3 days later developed loose watery stool.  Now he is on day 4.  He has not had a fever.  (He has a daughter with low-grade fever who is 2 years old.  Her temp has been up to 99.7 and she has had a little bit of loose stool but no blood or vomiting).  He is able to eat okay, some nausea but no emesis.  No bloody stool.  He has a little bit of sinus congestion.  He feels fatigued and has a loss of taste and smell and has been sleeping 12 hours a day.    He is requesting a COVID test. He works as a surgical tech at [employer] and has been unable to return to work without getting tested.     Patient Active Problem List   Diagnosis     Social phobia     Sleep disorder     CARDIOVASCULAR SCREENING; LDL GOAL LESS THAN 160     ADD (attention deficit hyperactivity disorder, inattentive type)     History of cold sores     Mild recurrent major depression (H)     Past Surgical History:   Procedure Laterality Date     TONSILLECTOMY         Social History     Tobacco Use     Smoking status: Former Smoker     Packs/day: 0.50     Last attempt to quit: 2013     Years since quittin.5     Smokeless tobacco: Never Used   Substance Use Topics     Alcohol use: No     Comment: treatment in      Family History   Problem Relation Age of Onset     Depression Sister      Depression Mother      Diabetes Mother         borderline     Hypertension Mother      Prostate Cancer Father 60     Lipids Father      Psychotic Disorder Maternal Uncle         suicide     Cancer - colorectal No family hx of            Reviewed and updated as needed this visit by Provider  Tobacco  Allergies  Meds  Problems  Med Hx  Surg Hx  Fam Hx         Review of Systems   Constitutional, HEENT, cardiovascular, pulmonary, gi and gu systems are  negative, except as otherwise noted.       Objective   Reported vitals:  There were no vitals taken for this visit.   healthy, alert and no distress  PSYCH: Alert and oriented times 3; coherent speech, normal   rate and volume, able to articulate logical thoughts, able   to abstract reason, no tangential thoughts, no hallucinations   or delusions  His affect is normal and pleasant  RESP: No cough, no audible wheezing, able to talk in full sentences  Remainder of exam unable to be completed due to telephone visits      Assessment/Plan:    ICD-10-CM    1. Diarrhea of presumed infectious origin  R19.7 Symptomatic COVID-19 Virus (Coronavirus) by PCR     Ova and Parasite Exam Routine     Enteric Bacteria and Virus Panel by [facility] Stool      I did place orders for a COVID screen.  Also we discussed getting tests for diarrhea.  He thinks that his symptoms are manageable and he is staying hydrated.  I did place orders for stool culture, ova and parasite exam in case his symptoms do not resolve in the next few days.  Discussed staying hydrated and watching urine output and to follow-up with any fever or blood in the stool or other worsening symptoms.    No follow-ups on file.      Phone call duration:  14 minutes    Henny Hilario MD

## 2020-06-30 DIAGNOSIS — R19.7 DIARRHEA OF PRESUMED INFECTIOUS ORIGIN: ICD-10-CM

## 2020-06-30 PROCEDURE — U0003 INFECTIOUS AGENT DETECTION BY NUCLEIC ACID (DNA OR RNA); SEVERE ACUTE RESPIRATORY SYNDROME CORONAVIRUS 2 (SARS-COV-2) (CORONAVIRUS DISEASE [COVID-19]), AMPLIFIED PROBE TECHNIQUE, MAKING USE OF HIGH THROUGHPUT TECHNOLOGIES AS DESCRIBED BY CMS-2020-01-R: HCPCS | Performed by: FAMILY MEDICINE

## 2020-07-01 ENCOUNTER — TELEPHONE (OUTPATIENT)
Dept: FAMILY MEDICINE | Facility: OTHER | Age: 40
End: 2020-07-01

## 2020-07-01 LAB
SARS-COV-2 RNA SPEC QL NAA+PROBE: NOT DETECTED
SPECIMEN SOURCE: NORMAL

## 2020-07-01 NOTE — TELEPHONE ENCOUNTER
Patient and wife called regarding Covid testing results.  Patient was informed that test is still currently in process.     Josey Miller RN

## 2020-07-03 DIAGNOSIS — I10 ESSENTIAL HYPERTENSION: ICD-10-CM

## 2020-07-03 RX ORDER — AMLODIPINE BESYLATE 10 MG/1
10 TABLET ORAL DAILY
Qty: 30 TABLET | Refills: 0 | Status: SHIPPED | OUTPATIENT
Start: 2020-07-03 | End: 2020-08-03

## 2020-07-03 NOTE — TELEPHONE ENCOUNTER
"Norvasc  Last Written Prescription Date:  06/05/2020  Last Fill Quantity: 30,  # refills: 0   Last office visit: 06/29/2020  with provider:  Sulaiman   Future Office Visit:  None  Routing refill request to provider for review/approval because:  Labs not current:  Creatinine.  Elevated blood pressures.     Requested Prescriptions   Pending Prescriptions Disp Refills     amLODIPine (NORVASC) 10 MG tablet [Pharmacy Med Name: amLODIPine 10 mg tablet (NORVASC)] 30 tablet 0     Sig: Take 1 tablet (10 mg) by mouth daily       Calcium Channel Blockers Protocol  Failed - 7/3/2020  7:32 AM        Failed - Blood pressure under 140/90 in past 12 months     BP Readings from Last 3 Encounters:   01/29/20 (!) 151/88   12/06/18 (!) 146/102   08/17/18 140/86           Failed - Normal serum creatinine on file in past 12 months     Recent Labs   Lab Test 12/06/18  1521   CR 0.93     Ok to refill medication if creatinine is low        Passed - Recent (12 mo) or future (30 days) visit within the authorizing provider's specialty     Patient has had an office visit with the authorizing provider or a provider within the authorizing providers department within the previous 12 mos or has a future within next 30 days. See \"Patient Info\" tab in inbasket, or \"Choose Columns\" in Meds & Orders section of the refill encounter.          Passed - Medication is active on med list        Passed - Patient is age 18 or older         Josey Miller RN   "

## 2020-07-08 NOTE — RESULT ENCOUNTER NOTE
Dear Roberto, your recent test results are attached.  Your coronavirus screening test is negative.  You will be contacted with any outstanding results when they are available.  Feel free to contact me via the office or My Chart if you have any questions regarding the above.  Sincerely,  DO RENO Andre

## 2020-07-22 ENCOUNTER — VIRTUAL VISIT (OUTPATIENT)
Dept: FAMILY MEDICINE | Facility: OTHER | Age: 40
End: 2020-07-22
Payer: COMMERCIAL

## 2020-07-22 DIAGNOSIS — Z20.822 SUSPECTED 2019 NOVEL CORONAVIRUS INFECTION: Primary | ICD-10-CM

## 2020-07-22 PROCEDURE — 99421 OL DIG E/M SVC 5-10 MIN: CPT | Performed by: PHYSICIAN ASSISTANT

## 2020-07-22 NOTE — PROGRESS NOTES
"Date: 2020 12:32:43  Clinician: Scott Garcia  Clinician NPI: [id number]  Patient: Roberto Guillen  Patient : 1980  Patient Address: [address] [place] [address]  Patient Phone: [phone]  Visit Protocol: URI  Patient Summary:  Roberto is a 40 year old ( : 1980 ) male who initiated a Visit for COVID-19 (Coronavirus) evaluation and screening. When asked the question \"Please sign me up to receive news, health information and promotions from [employer].\", Roberto responded \"No\".    Roberto states his symptoms started gradually 5-6 days ago.   His symptoms consist of myalgia, a sore throat, a cough, nasal congestion, rhinitis, malaise, and a headache.   Symptom details     Nasal secretions: The color of his mucus is clear, green, and yellow.    Cough: Roberto coughs a few times an hour and his cough is not more bothersome at night. Phlegm does not come into his throat when he coughs. He does not believe his cough is caused by post-nasal drip.     Sore throat: Roberto reports having mild throat pain (1-3 on a 10 point pain scale), does not have exudate on his tonsils, and can swallow liquids. The lymph nodes in his neck are not enlarged. A rash has not appeared on the skin since the sore throat started.     Headache: He states the headache is mild (1-3 on a 10 point pain scale).      Roberto denies having wheezing, nausea, teeth pain, ageusia, diarrhea, anosmia, facial pain or pressure, fever, vomiting, ear pain, chills, and enlarged lymph nodes. He also denies having recent facial or sinus surgery in the past 60 days, double sickening (worsening symptoms after initial improvement), taking antibiotic medication in the past month, and having a sinus infection within the past year. He is not experiencing dyspnea.   Precipitating events  Within the past week, Roberto has not been exposed to someone with strep throat. He has not recently been exposed to someone with influenza. Roberto has been in close contact with " the following high risk individuals: children under the age of 5.   Pertinent COVID-19 (Coronavirus) information  In the past 14 days, Roberto has not worked in a congregate living setting.   He either works or volunteers as a healthcare worker or a , or works or volunteers in a healthcare facility. He provides direct patient care. Additional job details as reported by the patient (free text): Surgical tech working in OR &amp; endoscopy @ [employer]   Roberto also has not lived in a congregate living setting in the past 14 days. He lives with a healthcare worker.   Roberto has not had a close contact with a laboratory-confirmed COVID-19 patient within 14 days of symptom onset.   Pertinent medical history  Roberto does not need a return to work/school note.   Weight: 230 lbs   Roberto smokes or uses smokeless tobacco.   Weight: 230 lbs    MEDICATIONS: trazodone oral, Norvasc oral, Effexor XR oral, ALLERGIES: NKDA  Clinician Response:  Dear Roberto,    Your symptoms show that you may have coronavirus (COVID-19). This illness can cause fever, cough and trouble breathing. Many people get a mild case and get better on their own. Some people can get very sick.  What should I do?  We would like to test you for this virus.   1. Please call [phone] to schedule your visit. Explain that you were referred by [facility] to have a COVID-19 test. Be ready to share your [facility] visit ID number.  The following will serve as your written order for this COVID Test, ordered by me, for the indication of suspected COVID [Z20.828]: The test will be ordered in [employer], our electronic health record, after you are scheduled. It will show as ordered and authorized by Dennis Harrell MD.  Order: COVID-19 (Coronavirus) PCR for SYMPTOMATIC testing from [facility].      2. When it's time for your COVID test:  Stay at least 6 feet away from others. (If someone will drive you to your test, stay in the backseat, as far away from the   "as you can.)   Cover your mouth and nose with a mask, tissue or washcloth.  Go straight to the testing site. Don't make any stops on the way there or back.      3.Starting now: Stay home and away from others (self-isolate) until:   You've had no fever---and no medicine that reduces fever---for 3 full days (72 hours). And...   Your other symptoms have gotten better. For example, your cough or breathing has improved. And...   At least 10 days have passed since your symptoms started.       During this time, don't leave the house except for testing or medical care.   Stay in your own room, even for meals. Use your own bathroom if you can.   Stay away from others in your home. No hugging, kissing or shaking hands. No visitors.  Don't go to work, school or anywhere else.    Clean \"high touch\" surfaces often (doorknobs, counters, handles, etc.). Use a household cleaning spray or wipes. You'll find a full list of  on the EPA website: www.epa.gov/pesticide-registration/list-n-disinfectants-use-against-sars-cov-2.   Cover your mouth and nose with a mask, tissue or washcloth to avoid spreading germs.  Wash your hands and face often. Use soap and water.  Caregivers in these groups are at risk for severe illness due to COVID-19:  o People 65 years and older  o People who live in a nursing home or long-term care facility  o People with chronic disease (lung, heart, cancer, diabetes, kidney, liver, immunologic)  o People who have a weakened immune system, including those who:   Are in cancer treatment  Take medicine that weakens the immune system, such as corticosteroids  Had a bone marrow or organ transplant  Have an immune deficiency  Have poorly controlled HIV or AIDS  Are obese (body mass index of 40 or higher)  Smoke regularly   o Caregivers should wear gloves while washing dishes, handling laundry and cleaning bedrooms and bathrooms.  o Use caution when washing and drying laundry: Don't shake dirty laundry, and use " the warmest water setting that you can.  o For more tips, go to www.cdc.gov/coronavirus/2019-ncov/downloads/10Things.pdf.    4.Sign up for [employer]. We know it's scary to hear that you might have COVID-19. We want to track your symptoms to make sure you're okay over the next 2 weeks. Please look for an email from [employer]---this is a free, online program that we'll use to keep in touch. To sign up, follow the link in the email. Learn more at [email or web address]  How can I take care of myself?   Get lots of rest. Drink extra fluids (unless a doctor has told you not to).   Take Tylenol (acetaminophen) for fever or pain. If you have liver or kidney problems, ask your family doctor if it's okay to take Tylenol.   Adults can take either:    650 mg (two 325 mg pills) every 4 to 6 hours, or...   1,000 mg (two 500 mg pills) every 8 hours as needed.    Note: Don't take more than 3,000 mg in one day. Acetaminophen is found in many medicines (both prescribed and over-the-counter medicines). Read all labels to be sure you don't take too much.   For children, check the Tylenol bottle for the right dose. The dose is based on the child's age or weight.    If you have other health problems (like cancer, heart failure, an organ transplant or severe kidney disease): Call your specialty clinic if you don't feel better in the next 2 days.       Know when to call 911. Emergency warning signs include:    Trouble breathing or shortness of breath Pain or pressure in the chest that doesn't go away Feeling confused like you haven't felt before, or not being able to wake up Bluish-colored lips or face.  Where can I get more information?    [employer] [place] -- About COVID-19: [email or web address]   CDC -- What to Do If You're Sick: www.cdc.gov/coronavirus/2019-ncov/about/steps-when-sick.html   CDC -- Ending Home Isolation: www.cdc.gov/coronavirus/2019-ncov/hcp/disposition-in-home-patients.html   CDC -- Caring  for Someone: www.cdc.gov/coronavirus/2019-ncov/if-you-are-sick/care-for-someone.html   [facility] -- Interim Guidance for Hospital Discharge to Home: www.health.[facility].[place].us/diseases/coronavirus/hcp/hospdischarge.pdf   [facility] clinical trials (COVID-19 research studies): clinicalaffairs.[facility]-clinical-trials    Below are the COVID-19 hotlines at the [place] Department of Health ([facility]). Interpreters are available.    For health questions: Call 462-365-4908 or 1-767.289.9940 (7 a.m. to 7 p.m.) For questions about schools and childcare: Call 750-524-5233 or 1-763.933.8700 (7 a.m. to 7 p.m.)      Diagnosis: Pain in throat  Diagnosis ICD: R07.0

## 2020-07-23 DIAGNOSIS — Z20.822 SUSPECTED 2019 NOVEL CORONAVIRUS INFECTION: ICD-10-CM

## 2020-07-23 PROCEDURE — U0003 INFECTIOUS AGENT DETECTION BY NUCLEIC ACID (DNA OR RNA); SEVERE ACUTE RESPIRATORY SYNDROME CORONAVIRUS 2 (SARS-COV-2) (CORONAVIRUS DISEASE [COVID-19]), AMPLIFIED PROBE TECHNIQUE, MAKING USE OF HIGH THROUGHPUT TECHNOLOGIES AS DESCRIBED BY CMS-2020-01-R: HCPCS | Performed by: FAMILY MEDICINE

## 2020-07-24 ENCOUNTER — TELEPHONE (OUTPATIENT)
Dept: FAMILY MEDICINE | Facility: OTHER | Age: 40
End: 2020-07-24

## 2020-07-24 LAB
SARS-COV-2 RNA SPEC QL NAA+PROBE: NOT DETECTED
SPECIMEN SOURCE: NORMAL

## 2020-07-24 NOTE — TELEPHONE ENCOUNTER
Patient and wife called requesting results.  Results of Not Detected given.  Expressed understanding.     Josey Miller RN

## 2020-07-27 NOTE — RESULT ENCOUNTER NOTE
Dear Roberto, your recent test results are attached.  Your COVID test is negative.  You will be contacted with any outstanding results when they are available.  Feel free to contact me via the office or My Chart if you have any questions regarding the above.  Sincerely,  DO RENO Andre

## 2021-01-05 DIAGNOSIS — F98.8 ATTENTION DEFICIT DISORDER, UNSPECIFIED HYPERACTIVITY PRESENCE: ICD-10-CM

## 2021-01-05 RX ORDER — DEXTROAMPHETAMINE SULFATE, DEXTROAMPHETAMINE SACCHARATE, AMPHETAMINE SULFATE AND AMPHETAMINE ASPARTATE 5; 5; 5; 5 MG/1; MG/1; MG/1; MG/1
CAPSULE, EXTENDED RELEASE ORAL
Qty: 90 CAPSULE | Refills: 0 | Status: SHIPPED | OUTPATIENT
Start: 2021-01-05 | End: 2021-11-24

## 2021-01-05 NOTE — TELEPHONE ENCOUNTER
Requested Prescriptions   Pending Prescriptions Disp Refills     ADDERALL XR 20 MG 24 hr capsule [Pharmacy Med Name: dextroamphetamine-amphetamine ER 20 mg 24hr capsule,extend release (ADDERALL  XR)] 90 capsule 0     Sig: Take 1 capsule (20 mg) by mouth daily     Last Written Prescription Date:  03/23/2020  Last Fill Quantity: 90,   # refills: 0  Last Office Visit: 06/29/2020  Future Office visit:       Routing refill request to provider for review/approval because:  Drug not on the [facility], [facility] or [facility] refill protocol or controlled substance    Justa Michael MA

## 2021-01-12 DIAGNOSIS — G47.9 SLEEP DISORDER: ICD-10-CM

## 2021-01-12 DIAGNOSIS — F40.10 SOCIAL PHOBIA: ICD-10-CM

## 2021-01-13 RX ORDER — TRAZODONE HYDROCHLORIDE 100 MG/1
TABLET ORAL
Qty: 135 TABLET | Refills: 3 | Status: SHIPPED | OUTPATIENT
Start: 2021-01-13 | End: 2022-01-03

## 2021-01-13 NOTE — TELEPHONE ENCOUNTER
Routing refill request to provider for review/approval because:  Drug interaction warning    Josey Miller RN

## 2021-01-15 ENCOUNTER — HEALTH MAINTENANCE LETTER (OUTPATIENT)
Age: 41
End: 2021-01-15

## 2021-01-30 DIAGNOSIS — I10 ESSENTIAL HYPERTENSION: ICD-10-CM

## 2021-02-01 NOTE — TELEPHONE ENCOUNTER
Routing refill request to provider for review/approval because:  Labs not current:  Creatinine  Blood pressures elevated above goal.     Josey Miller Rn

## 2021-02-02 RX ORDER — AMLODIPINE BESYLATE 10 MG/1
10 TABLET ORAL DAILY
Qty: 30 TABLET | Refills: 5 | Status: SHIPPED | OUTPATIENT
Start: 2021-02-02 | End: 2021-03-10

## 2021-02-06 DIAGNOSIS — F40.10 SOCIAL PHOBIA: ICD-10-CM

## 2021-02-06 DIAGNOSIS — F33.0 MILD RECURRENT MAJOR DEPRESSION (H): ICD-10-CM

## 2021-02-08 RX ORDER — VENLAFAXINE HYDROCHLORIDE 75 MG/1
225 CAPSULE, EXTENDED RELEASE ORAL DAILY
Qty: 90 CAPSULE | Refills: 0 | Status: SHIPPED | OUTPATIENT
Start: 2021-02-08 | End: 2021-03-10

## 2021-02-08 NOTE — TELEPHONE ENCOUNTER
Routing refill request to provider for review/approval because:  Labs not current:  Creatinine  Patient needs to be seen because it has been more than 1 year since last office visit.  PHQ 9 not current    Josey Miller RN

## 2021-03-10 ENCOUNTER — OFFICE VISIT (OUTPATIENT)
Dept: INTERNAL MEDICINE | Facility: CLINIC | Age: 41
End: 2021-03-10
Payer: COMMERCIAL

## 2021-03-10 VITALS
WEIGHT: 236 LBS | HEART RATE: 108 BPM | SYSTOLIC BLOOD PRESSURE: 156 MMHG | OXYGEN SATURATION: 98 % | TEMPERATURE: 97.7 F | DIASTOLIC BLOOD PRESSURE: 92 MMHG | RESPIRATION RATE: 18 BRPM | HEIGHT: 75 IN | BODY MASS INDEX: 29.34 KG/M2

## 2021-03-10 DIAGNOSIS — Z00.00 ROUTINE GENERAL MEDICAL EXAMINATION AT A HEALTH CARE FACILITY: Primary | ICD-10-CM

## 2021-03-10 DIAGNOSIS — E78.5 HYPERLIPIDEMIA LDL GOAL <130: ICD-10-CM

## 2021-03-10 DIAGNOSIS — I10 ESSENTIAL HYPERTENSION: ICD-10-CM

## 2021-03-10 DIAGNOSIS — F90.0 ATTENTION DEFICIT HYPERACTIVITY DISORDER (ADHD), PREDOMINANTLY INATTENTIVE TYPE: ICD-10-CM

## 2021-03-10 DIAGNOSIS — F33.0 MILD RECURRENT MAJOR DEPRESSION (H): ICD-10-CM

## 2021-03-10 DIAGNOSIS — F40.10 SOCIAL PHOBIA: ICD-10-CM

## 2021-03-10 LAB
ALBUMIN SERPL-MCNC: 4.3 G/DL (ref 3.4–5)
ALP SERPL-CCNC: 74 U/L (ref 40–150)
ALT SERPL W P-5'-P-CCNC: 73 U/L (ref 0–70)
ANION GAP SERPL CALCULATED.3IONS-SCNC: 6 MMOL/L (ref 3–14)
AST SERPL W P-5'-P-CCNC: 29 U/L (ref 0–45)
BASOPHILS # BLD AUTO: 0 10E9/L (ref 0–0.2)
BASOPHILS NFR BLD AUTO: 0.6 %
BILIRUB SERPL-MCNC: 0.6 MG/DL (ref 0.2–1.3)
BUN SERPL-MCNC: 16 MG/DL (ref 7–30)
CALCIUM SERPL-MCNC: 8.8 MG/DL (ref 8.5–10.1)
CHLORIDE SERPL-SCNC: 108 MMOL/L (ref 94–109)
CHOLEST SERPL-MCNC: 215 MG/DL
CO2 SERPL-SCNC: 25 MMOL/L (ref 20–32)
CREAT SERPL-MCNC: 0.74 MG/DL (ref 0.66–1.25)
DIFFERENTIAL METHOD BLD: NORMAL
EOSINOPHIL # BLD AUTO: 0.4 10E9/L (ref 0–0.7)
EOSINOPHIL NFR BLD AUTO: 5 %
ERYTHROCYTE [DISTWIDTH] IN BLOOD BY AUTOMATED COUNT: 12.1 % (ref 10–15)
GFR SERPL CREATININE-BSD FRML MDRD: >90 ML/MIN/{1.73_M2}
GLUCOSE SERPL-MCNC: 97 MG/DL (ref 70–99)
HCT VFR BLD AUTO: 46.2 % (ref 40–53)
HDLC SERPL-MCNC: 46 MG/DL
HGB BLD-MCNC: 16.1 G/DL (ref 13.3–17.7)
IMM GRANULOCYTES # BLD: 0 10E9/L (ref 0–0.4)
IMM GRANULOCYTES NFR BLD: 0.1 %
LDLC SERPL CALC-MCNC: 127 MG/DL
LYMPHOCYTES # BLD AUTO: 2 10E9/L (ref 0.8–5.3)
LYMPHOCYTES NFR BLD AUTO: 28.7 %
MCH RBC QN AUTO: 30.3 PG (ref 26.5–33)
MCHC RBC AUTO-ENTMCNC: 34.8 G/DL (ref 31.5–36.5)
MCV RBC AUTO: 87 FL (ref 78–100)
MONOCYTES # BLD AUTO: 0.6 10E9/L (ref 0–1.3)
MONOCYTES NFR BLD AUTO: 8.4 %
NEUTROPHILS # BLD AUTO: 4 10E9/L (ref 1.6–8.3)
NEUTROPHILS NFR BLD AUTO: 57.2 %
NONHDLC SERPL-MCNC: 169 MG/DL
NRBC # BLD AUTO: 0 10*3/UL
NRBC BLD AUTO-RTO: 0 /100
PLATELET # BLD AUTO: 258 10E9/L (ref 150–450)
POTASSIUM SERPL-SCNC: 3.7 MMOL/L (ref 3.4–5.3)
PROT SERPL-MCNC: 7.6 G/DL (ref 6.8–8.8)
RBC # BLD AUTO: 5.31 10E12/L (ref 4.4–5.9)
SODIUM SERPL-SCNC: 139 MMOL/L (ref 133–144)
TRIGL SERPL-MCNC: 211 MG/DL
WBC # BLD AUTO: 7 10E9/L (ref 4–11)

## 2021-03-10 PROCEDURE — 85025 COMPLETE CBC W/AUTO DIFF WBC: CPT | Performed by: INTERNAL MEDICINE

## 2021-03-10 PROCEDURE — 80061 LIPID PANEL: CPT | Performed by: INTERNAL MEDICINE

## 2021-03-10 PROCEDURE — 99395 PREV VISIT EST AGE 18-39: CPT | Performed by: INTERNAL MEDICINE

## 2021-03-10 PROCEDURE — 36415 COLL VENOUS BLD VENIPUNCTURE: CPT | Performed by: INTERNAL MEDICINE

## 2021-03-10 PROCEDURE — 80053 COMPREHEN METABOLIC PANEL: CPT | Performed by: INTERNAL MEDICINE

## 2021-03-10 RX ORDER — LOSARTAN POTASSIUM AND HYDROCHLOROTHIAZIDE 12.5; 5 MG/1; MG/1
1 TABLET ORAL DAILY
Qty: 30 TABLET | Refills: 0 | Status: SHIPPED | OUTPATIENT
Start: 2021-03-10 | End: 2021-03-30

## 2021-03-10 RX ORDER — AMLODIPINE BESYLATE 10 MG/1
10 TABLET ORAL DAILY
Qty: 30 TABLET | Refills: 5 | Status: SHIPPED | OUTPATIENT
Start: 2021-03-10 | End: 2021-03-30

## 2021-03-10 RX ORDER — VENLAFAXINE HYDROCHLORIDE 75 MG/1
225 CAPSULE, EXTENDED RELEASE ORAL DAILY
Qty: 90 CAPSULE | Refills: 3 | Status: SHIPPED | OUTPATIENT
Start: 2021-03-10 | End: 2021-03-30

## 2021-03-10 RX ORDER — LISINOPRIL/HYDROCHLOROTHIAZIDE 10-12.5 MG
1 TABLET ORAL DAILY
Qty: 30 TABLET | Refills: 0 | Status: SHIPPED | OUTPATIENT
Start: 2021-03-10 | End: 2021-03-10

## 2021-03-10 ASSESSMENT — ENCOUNTER SYMPTOMS
COUGH: 0
FEVER: 0
CONSTIPATION: 0
HEADACHES: 0
CHILLS: 0
ABDOMINAL PAIN: 0
JOINT SWELLING: 0
DIARRHEA: 0
NAUSEA: 0
FREQUENCY: 0
PARESTHESIAS: 0
PALPITATIONS: 0
ARTHRALGIAS: 0
NERVOUS/ANXIOUS: 0
HEMATOCHEZIA: 0
MYALGIAS: 0
EYE PAIN: 0
HEMATURIA: 0
SHORTNESS OF BREATH: 0
DYSURIA: 0
WEAKNESS: 0
HEARTBURN: 0
SORE THROAT: 0
DIZZINESS: 0

## 2021-03-10 ASSESSMENT — MIFFLIN-ST. JEOR: SCORE: 2071.12

## 2021-03-10 ASSESSMENT — PATIENT HEALTH QUESTIONNAIRE - PHQ9
5. POOR APPETITE OR OVEREATING: NOT AT ALL
SUM OF ALL RESPONSES TO PHQ QUESTIONS 1-9: 0

## 2021-03-10 ASSESSMENT — ANXIETY QUESTIONNAIRES
3. WORRYING TOO MUCH ABOUT DIFFERENT THINGS: NOT AT ALL
7. FEELING AFRAID AS IF SOMETHING AWFUL MIGHT HAPPEN: NOT AT ALL
2. NOT BEING ABLE TO STOP OR CONTROL WORRYING: SEVERAL DAYS
5. BEING SO RESTLESS THAT IT IS HARD TO SIT STILL: NOT AT ALL
IF YOU CHECKED OFF ANY PROBLEMS ON THIS QUESTIONNAIRE, HOW DIFFICULT HAVE THESE PROBLEMS MADE IT FOR YOU TO DO YOUR WORK, TAKE CARE OF THINGS AT HOME, OR GET ALONG WITH OTHER PEOPLE: NOT DIFFICULT AT ALL
6. BECOMING EASILY ANNOYED OR IRRITABLE: NOT AT ALL
GAD7 TOTAL SCORE: 2
1. FEELING NERVOUS, ANXIOUS, OR ON EDGE: SEVERAL DAYS

## 2021-03-10 ASSESSMENT — PAIN SCALES - GENERAL: PAINLEVEL: NO PAIN (0)

## 2021-03-10 NOTE — PROGRESS NOTES
SUBJECTIVE:   CC: Roberto Guillen is an 41 year old male who presents for preventative health visit.     Patient has been advised of split billing requirements and indicates understanding: Yes  Healthy Habits:     Getting at least 3 servings of Calcium per day:  Yes    Bi-annual eye exam:  NO    Dental care twice a year:  NO    Sleep apnea or symptoms of sleep apnea:  None    Diet:  Regular (no restrictions)    Frequency of exercise:  1 day/week    Duration of exercise:  15-30 minutes    Taking medications regularly:  Yes    Medication side effects:  None    PHQ-2 Total Score: 0    Additional concerns today:  No          -------------------------------------    Today's PHQ-2 Score:   PHQ-2 (  [employer]) 3/10/2021   Q1: Little interest or pleasure in doing things 0   Q2: Feeling down, depressed or hopeless 0   PHQ-2 Score 0   Q1: Little interest or pleasure in doing things Not at all   Q2: Feeling down, depressed or hopeless Not at all   PHQ-2 Score 0       Abuse: Current or Past(Physical, Sexual or Emotional)- No  Do you feel safe in your environment? Yes    Have you ever done Advance Care Planning? (For example, a Health Directive, POLST, or a discussion with a medical provider or your loved ones about your wishes): No, advance care planning information given to patient to review.  Patient plans to discuss their wishes with loved ones or provider.      Social History     Tobacco Use     Smoking status: Former Smoker     Packs/day: 0.50     Quit date: 2013     Years since quittin.1     Smokeless tobacco: Never Used   Substance Use Topics     Alcohol use: No     Comment: treatment in 2006         Alcohol Use 3/10/2021   Prescreen: >3 drinks/day or >7 drinks/week? No   Prescreen: >3 drinks/day or >7 drinks/week? -       Last PSA: No results found for: PSA    Reviewed orders with patient. Reviewed health maintenance and updated orders accordingly - Yes  Lab work is in process  Labs reviewed in EPIC  BP  Readings from Last 3 Encounters:   03/10/21 (!) 156/92   20 (!) 151/88   18 (!) 146/102    Wt Readings from Last 3 Encounters:   03/10/21 107 kg (236 lb)   18 103.1 kg (227 lb 3.2 oz)   18 104.8 kg (231 lb)                  Patient Active Problem List   Diagnosis     Social phobia     Sleep disorder     CARDIOVASCULAR SCREENING; LDL GOAL LESS THAN 160     ADD (attention deficit hyperactivity disorder, inattentive type)     History of cold sores     Mild recurrent major depression (H)     Past Surgical History:   Procedure Laterality Date     TONSILLECTOMY         Social History     Tobacco Use     Smoking status: Former Smoker     Packs/day: 0.50     Quit date: 2013     Years since quittin.1     Smokeless tobacco: Never Used   Substance Use Topics     Alcohol use: No     Comment: treatment in      Family History   Problem Relation Age of Onset     Depression Sister      Depression Mother      Diabetes Mother         borderline     Hypertension Mother      Prostate Cancer Father 60     Lipids Father      Psychotic Disorder Maternal Uncle         suicide     Cancer - colorectal No family hx of          Current Outpatient Medications   Medication Sig Dispense Refill     ADDERALL XR 20 MG 24 hr capsule Take 1 capsule (20 mg) by mouth daily 90 capsule 0     amLODIPine (NORVASC) 10 MG tablet Take 1 tablet (10 mg) by mouth daily 30 tablet 5     LORazepam (ATIVAN) 0.5 MG tablet ONE - TWO EVERY 6 HOURS, AS NEEDED FOR ANXIETY. 90 tablet 0     losartan-hydrochlorothiazide (HYZAAR) 50-12.5 MG tablet Take 1 tablet by mouth daily 30 tablet 0     sildenafil (VIAGRA) 100 MG tablet Take 0.5-1 tablets ( mg) by mouth daily as needed (for erectile dysfunction) 10 tablet 11     traZODone (DESYREL) 100 MG tablet Take one and one-half tablets (150 mg) by mouth at bedtime for sleep 135 tablet 3     venlafaxine (EFFEXOR) 37.5 MG tablet One tablet to be taken if extended release Effexor is missed  (to help alleviate side effects) 60 tablet 0     venlafaxine (EFFEXOR-XR) 75 MG 24 hr capsule Take 3 capsules (225 mg) by mouth daily 90 capsule 3     valACYclovir (VALTREX) 1000 mg tablet Take  by mouth. 2 Tabs bid x1 day for cold sore outbreak. 4 tablets for treatment. (Patient not taking: Reported on 3/10/2021) 28 tablet 2     No Known Allergies    Reviewed and updated as needed this visit by clinical staff  Tobacco  Allergies  Meds   Med Hx  Surg Hx  Fam Hx  Soc Hx        Reviewed and updated as needed this visit by Provider                Past Medical History:   Diagnosis Date     Genital warts 8/12/2010     Lipoma     right leg, excised     Moderate recurrent major depression (H) 7/22/2009    Dx age  21     Social phobia 7/22/2009      Past Surgical History:   Procedure Laterality Date     TONSILLECTOMY  1990       Review of Systems   Constitutional: Negative for chills and fever.   HENT: Negative for congestion, ear pain, hearing loss and sore throat.    Eyes: Negative for pain and visual disturbance.   Respiratory: Negative for cough and shortness of breath.    Cardiovascular: Negative for chest pain, palpitations and peripheral edema.   Gastrointestinal: Negative for abdominal pain, constipation, diarrhea, heartburn, hematochezia and nausea.   Genitourinary: Positive for impotence. Negative for discharge, dysuria, frequency, genital sores, hematuria and urgency.   Musculoskeletal: Negative for arthralgias, joint swelling and myalgias.   Skin: Negative for rash.   Neurological: Negative for dizziness, weakness, headaches and paresthesias.   Psychiatric/Behavioral: Negative for mood changes. The patient is not nervous/anxious.      CONSTITUTIONAL: NEGATIVE for fever, chills, change in weight  INTEGUMENTARY/SKIN: NEGATIVE for worrisome rashes, moles or lesions  EYES: NEGATIVE for vision changes or irritation  ENT: NEGATIVE for ear, mouth and throat problems  RESP: NEGATIVE for significant cough or  "SOB  CV: NEGATIVE for chest pain, palpitations or peripheral edema  GI: NEGATIVE for nausea, abdominal pain, heartburn, or change in bowel habits   male: negative for dysuria, hematuria, decreased urinary stream, erectile dysfunction, urethral discharge  MUSCULOSKELETAL: NEGATIVE for significant arthralgias or myalgia  NEURO: NEGATIVE for weakness, dizziness or paresthesias  PSYCHIATRIC: NEGATIVE for changes in mood or affect    OBJECTIVE:   BP (!) 156/92 (BP Location: Right arm, Patient Position: Sitting, Cuff Size: Adult Large)   Pulse 108   Temp 97.7  F (36.5  C) (Temporal)   Resp 18   Ht 1.905 m (6' 3\")   Wt 107 kg (236 lb)   SpO2 98%   BMI 29.50 kg/m      Physical Exam  GENERAL: healthy, alert and no distress  EYES: Eyes grossly normal to inspection, PERRL and conjunctivae and sclerae normal  HENT: ear canals and TM's normal, nose and mouth without ulcers or lesions  NECK: no adenopathy, no asymmetry, masses, or scars and thyroid normal to palpation  RESP: lungs clear to auscultation - no rales, rhonchi or wheezes  CV: regular rate and rhythm, normal S1 S2, no S3 or S4, no murmur, click or rub, no peripheral edema and peripheral pulses strong  ABDOMEN: soft, nontender, no hepatosplenomegaly, no masses and bowel sounds normal  MS: no gross musculoskeletal defects noted, no edema  SKIN: no suspicious lesions or rashes  NEURO: Normal strength and tone, mentation intact and speech normal  PSYCH: mentation appears normal, affect normal/bright    Diagnostic Test Results:  Results for orders placed or performed in visit on 03/10/21 (from the past 24 hour(s))   Comprehensive metabolic panel (BMP + Alb, Alk Phos, ALT, AST, Total. Bili, TP)   Result Value Ref Range    Sodium 139 133 - 144 mmol/L    Potassium 3.7 3.4 - 5.3 mmol/L    Chloride 108 94 - 109 mmol/L    Carbon Dioxide 25 20 - 32 mmol/L    Anion Gap 6 3 - 14 mmol/L    Glucose 97 70 - 99 mg/dL    Urea Nitrogen 16 7 - 30 mg/dL    Creatinine 0.74 0.66 - " 1.25 mg/dL    GFR Estimate >90 >60 mL/min/[1.73_m2]    GFR Estimate If Black >90 >60 mL/min/[1.73_m2]    Calcium 8.8 8.5 - 10.1 mg/dL    Bilirubin Total 0.6 0.2 - 1.3 mg/dL    Albumin 4.3 3.4 - 5.0 g/dL    Protein Total 7.6 6.8 - 8.8 g/dL    Alkaline Phosphatase 74 40 - 150 U/L    ALT 73 (H) 0 - 70 U/L    AST 29 0 - 45 U/L   Lipid panel reflex to direct LDL Fasting   Result Value Ref Range    Cholesterol 215 (H) <200 mg/dL    Triglycerides 211 (H) <150 mg/dL    HDL Cholesterol 46 >39 mg/dL    LDL Cholesterol Calculated 127 (H) <100 mg/dL    Non HDL Cholesterol 169 (H) <130 mg/dL   CBC with platelets and differential   Result Value Ref Range    WBC 7.0 4.0 - 11.0 10e9/L    RBC Count 5.31 4.4 - 5.9 10e12/L    Hemoglobin 16.1 13.3 - 17.7 g/dL    Hematocrit 46.2 40.0 - 53.0 %    MCV 87 78 - 100 fl    MCH 30.3 26.5 - 33.0 pg    MCHC 34.8 31.5 - 36.5 g/dL    RDW 12.1 10.0 - 15.0 %    Platelet Count 258 150 - 450 10e9/L    Diff Method Automated Method     % Neutrophils 57.2 %    % Lymphocytes 28.7 %    % Monocytes 8.4 %    % Eosinophils 5.0 %    % Basophils 0.6 %    % Immature Granulocytes 0.1 %    Nucleated RBCs 0 0 /100    Absolute Neutrophil 4.0 1.6 - 8.3 10e9/L    Absolute Lymphocytes 2.0 0.8 - 5.3 10e9/L    Absolute Monocytes 0.6 0.0 - 1.3 10e9/L    Absolute Eosinophils 0.4 0.0 - 0.7 10e9/L    Absolute Basophils 0.0 0.0 - 0.2 10e9/L    Abs Immature Granulocytes 0.0 0 - 0.4 10e9/L    Absolute Nucleated RBC 0.0        ASSESSMENT/PLAN:   1. Routine general medical examination at a health care facility  Overall doing well.  Check CBC  - CBC with platelets and differential    2. Social phobia  Responding well to Effexor.  Continue medication  - venlafaxine (EFFEXOR-XR) 75 MG 24 hr capsule; Take 3 capsules (225 mg) by mouth daily  Dispense: 90 capsule; Refill: 3    3. Mild recurrent major depression (H)  As above  - venlafaxine (EFFEXOR-XR) 75 MG 24 hr capsule; Take 3 capsules (225 mg) by mouth daily  Dispense: 90 capsule;  "Refill: 3    4. ADD (attention deficit hyperactivity disorder, inattentive type)  Using the Adderall during work days.  Does not use on weekends.  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    5. Hyperlipidemia LDL goal <130  Check lipids and liver function  - Comprehensive metabolic panel (BMP + Alb, Alk Phos, ALT, AST, Total. Bili, TP)  - Lipid panel reflex to direct LDL Fasting    6. Essential hypertension  Continue Norvasc and will start losartan hydrochlorothiazide as blood pressures have been escalated  - amLODIPine (NORVASC) 10 MG tablet; Take 1 tablet (10 mg) by mouth daily  Dispense: 30 tablet; Refill: 5  - losartan-hydrochlorothiazide (HYZAAR) 50-12.5 MG tablet; Take 1 tablet by mouth daily  Dispense: 30 tablet; Refill: 0    Patient has been advised of split billing requirements and indicates understanding: Yes  COUNSELING:   Reviewed preventive health counseling, as reflected in patient instructions       Regular exercise       Healthy diet/nutrition       Vision screening       Hearing screening    Estimated body mass index is 29.5 kg/m  as calculated from the following:    Height as of this encounter: 1.905 m (6' 3\").    Weight as of this encounter: 107 kg (236 lb).         He reports that he quit smoking about 8 years ago. He smoked 0.50 packs per day. He has never used smokeless tobacco.      Counseling Resources:  ATP IV Guidelines  Pooled Cohorts Equation Calculator  FRAX Risk Assessment  ICSI Preventive Guidelines  Dietary Guidelines for Americans, 2010  USDA's MyPlate  ASA Prophylaxis  Lung CA Screening    Jose Duarte DO  [facility]  "

## 2021-03-11 ASSESSMENT — ANXIETY QUESTIONNAIRES: GAD7 TOTAL SCORE: 2

## 2021-03-25 DIAGNOSIS — F40.10 SOCIAL PHOBIA: ICD-10-CM

## 2021-03-25 RX ORDER — LORAZEPAM 0.5 MG/1
TABLET ORAL
Qty: 90 TABLET | Refills: 0 | Status: SHIPPED | OUTPATIENT
Start: 2021-03-25 | End: 2021-08-06

## 2021-03-25 NOTE — TELEPHONE ENCOUNTER
Lorazepam        Last Written Prescription Date:  3/23/2020  Last Fill Quantity: 90,   # refills: 0  Last Office Visit: 3/10/2021  Future Office visit:       Routing refill request to provider for review/approval because:  Drug not on the [facility], [facility] or [facility] refill protocol or controlled substance

## 2021-03-30 ENCOUNTER — E-VISIT (OUTPATIENT)
Dept: INTERNAL MEDICINE | Facility: CLINIC | Age: 41
End: 2021-03-30
Payer: COMMERCIAL

## 2021-03-30 DIAGNOSIS — F40.10 SOCIAL PHOBIA: ICD-10-CM

## 2021-03-30 DIAGNOSIS — I10 ESSENTIAL HYPERTENSION: ICD-10-CM

## 2021-03-30 DIAGNOSIS — R68.82 DECREASED LIBIDO: Primary | ICD-10-CM

## 2021-03-30 DIAGNOSIS — F33.0 MILD RECURRENT MAJOR DEPRESSION (H): ICD-10-CM

## 2021-03-30 PROCEDURE — 99421 OL DIG E/M SVC 5-10 MIN: CPT | Performed by: INTERNAL MEDICINE

## 2021-03-30 RX ORDER — VENLAFAXINE HYDROCHLORIDE 75 MG/1
225 CAPSULE, EXTENDED RELEASE ORAL DAILY
Qty: 270 CAPSULE | Refills: 3 | Status: SHIPPED | OUTPATIENT
Start: 2021-03-30 | End: 2022-04-01

## 2021-03-30 RX ORDER — AMLODIPINE BESYLATE 10 MG/1
10 TABLET ORAL DAILY
Qty: 90 TABLET | Refills: 1 | Status: SHIPPED | OUTPATIENT
Start: 2021-03-30 | End: 2022-01-25

## 2021-03-30 RX ORDER — LOSARTAN POTASSIUM AND HYDROCHLOROTHIAZIDE 12.5; 5 MG/1; MG/1
1 TABLET ORAL DAILY
Qty: 90 TABLET | Refills: 1 | Status: SHIPPED | OUTPATIENT
Start: 2021-03-30 | End: 2021-10-06

## 2021-03-31 NOTE — PATIENT INSTRUCTIONS
Thank you for choosing us for your care. Given your symptoms, I would like you to do a lab-only visit to determine what is causing them.  I have placed the orders.  Please schedule an appointment with the lab right here in [place], or call [phone].  I will let you know when the results are back and next steps to take.

## 2021-04-02 DIAGNOSIS — R68.82 DECREASED LIBIDO: ICD-10-CM

## 2021-04-02 PROCEDURE — 99000 SPECIMEN HANDLING OFFICE-LAB: CPT | Performed by: INTERNAL MEDICINE

## 2021-04-02 PROCEDURE — 84403 ASSAY OF TOTAL TESTOSTERONE: CPT | Mod: 90 | Performed by: INTERNAL MEDICINE

## 2021-04-02 PROCEDURE — 36415 COLL VENOUS BLD VENIPUNCTURE: CPT | Performed by: INTERNAL MEDICINE

## 2021-04-04 ENCOUNTER — HOSPITAL ENCOUNTER (EMERGENCY)
Facility: CLINIC | Age: 41
Discharge: HOME | End: 2021-04-04
Attending: EMERGENCY MEDICINE | Admitting: EMERGENCY MEDICINE
Payer: COMMERCIAL

## 2021-04-04 ENCOUNTER — APPOINTMENT (OUTPATIENT)
Dept: GENERAL RADIOLOGY | Facility: CLINIC | Age: 41
End: 2021-04-04
Attending: EMERGENCY MEDICINE
Payer: COMMERCIAL

## 2021-04-04 VITALS
WEIGHT: 237 LBS | HEART RATE: 109 BPM | DIASTOLIC BLOOD PRESSURE: 105 MMHG | OXYGEN SATURATION: 99 % | HEIGHT: 75 IN | TEMPERATURE: 97.7 F | SYSTOLIC BLOOD PRESSURE: 148 MMHG | RESPIRATION RATE: 18 BRPM | BODY MASS INDEX: 29.47 KG/M2

## 2021-04-04 DIAGNOSIS — J01.00 ACUTE MAXILLARY SINUSITIS, RECURRENCE NOT SPECIFIED: ICD-10-CM

## 2021-04-04 DIAGNOSIS — R51.9 LEFT FACIAL PAIN: ICD-10-CM

## 2021-04-04 PROCEDURE — 70210 X-RAY EXAM OF SINUSES: CPT

## 2021-04-04 PROCEDURE — 99283 EMERGENCY DEPT VISIT LOW MDM: CPT | Performed by: EMERGENCY MEDICINE

## 2021-04-04 RX ORDER — METHYLPREDNISOLONE 4 MG
TABLET, DOSE PACK ORAL
Qty: 21 TABLET | Refills: 0 | Status: SHIPPED | OUTPATIENT
Start: 2021-04-04 | End: 2021-04-07

## 2021-04-04 RX ORDER — SULFAMETHOXAZOLE/TRIMETHOPRIM 800-160 MG
1 TABLET ORAL 2 TIMES DAILY
Qty: 20 TABLET | Refills: 0 | Status: SHIPPED | OUTPATIENT
Start: 2021-04-04 | End: 2021-04-14

## 2021-04-04 ASSESSMENT — MIFFLIN-ST. JEOR: SCORE: 2075.65

## 2021-04-04 NOTE — ED PROVIDER NOTES
History     Chief Complaint   Patient presents with     Facial Pain     HPI  Roberto Guillen is a 41 year old male who presents with complaints of left facial pain and numbness.  Patient states that when he gets somewhat dehydrated his nose dries out.  He has been feeling congested so he has been blowing the nose quite vigorously.  No bloody or colored discharge.  No pain is on the left lateral nose and sinus area.  His teeth also hurt.  He denies any oral swelling or injury.  No difficulty speech or swallowing.  Denies any TMJ tenderness but the pain does radiate towards his ear currently.  No ear drainage.  Denies fever or chills.  Denies seasonal allergies.  This happened previously where he feels congestion and blows his nose vigorously.  He then experiences increased pain like something is pinched off.  He gets numb in the face around the sinus after that.  No history of migraine or ocular migraine.  He has no visual changes associated.  He has been immunized against Covid.  No other upper respiratory symptoms such as change in taste or smell, sore throat, chest pain, shortness of breath or cough.  Can over-the-counter pain meds and decongestant without improvement.  Also tried Afrin spray and [place] pot without relief.    Allergies:  No Known Allergies    Problem List:    Patient Active Problem List    Diagnosis Date Noted     History of cold sores 07/05/2017     Priority: Medium     Mild recurrent major depression (H) 07/05/2017     Priority: Medium     ADD (attention deficit hyperactivity disorder, inattentive type) 05/28/2015     Priority: Medium     CARDIOVASCULAR SCREENING; LDL GOAL LESS THAN 160 10/31/2010     Priority: Medium     Social phobia 07/22/2009     Priority: Medium     Sleep disorder 07/22/2009     Priority: Medium        Past Medical History:    Past Medical History:   Diagnosis Date     Genital warts 8/12/2010     Lipoma      Moderate recurrent major depression (H) 7/22/2009      "Social phobia 2009       Past Surgical History:    Past Surgical History:   Procedure Laterality Date     TONSILLECTOMY         Family History:    Family History   Problem Relation Age of Onset     Depression Sister      Depression Mother      Diabetes Mother         borderline     Hypertension Mother      Prostate Cancer Father 60     Lipids Father      Psychotic Disorder Maternal Uncle         suicide     Cancer - colorectal No family hx of        Social History:  Marital Status:  Single [1]  Social History     Tobacco Use     Smoking status: Current Some Day Smoker     Packs/day: 0.50     Last attempt to quit: 2013     Years since quittin.2     Smokeless tobacco: Never Used   Substance Use Topics     Alcohol use: Yes     Drug use: No        Medications:    methylPREDNISolone (MEDROL DOSEPAK) 4 MG tablet therapy pack  sulfamethoxazole-trimethoprim (BACTRIM DS) 800-160 MG tablet  ADDERALL XR 20 MG 24 hr capsule  amLODIPine (NORVASC) 10 MG tablet  LORazepam (ATIVAN) 0.5 MG tablet  losartan-hydrochlorothiazide (HYZAAR) 50-12.5 MG tablet  sildenafil (VIAGRA) 100 MG tablet  traZODone (DESYREL) 100 MG tablet  valACYclovir (VALTREX) 1000 mg tablet  venlafaxine (EFFEXOR) 37.5 MG tablet  venlafaxine (EFFEXOR-XR) 75 MG 24 hr capsule          Review of Systems all other systems are reviewed and are negative.    Physical Exam   BP: (!) 148/105  Pulse: 109  Temp: 97.7  F (36.5  C)  Resp: 18  Height: 190.5 cm (6' 3\")  Weight: 107.5 kg (237 lb)  SpO2: 99 %      Physical Exam General alert cooperative male in mild to moderate stress.  HEENT shows no obvious facial swelling or asymmetry.  Pupils are equal react light accommodation.  Extraocular motions are intact.  No scleral injection or icterus.  Nasal mucosal passages are swollen to near occlusion especially on the left.  Tenderness over the left maxillary sinus.  He describes numbness with touch.  No oral lesions.  No dental pain or gum swelling.  No trismus " or malocclusion.  No TMJ tenderness.  Ears are clear bilaterally.  Neck is supple without adenopathy or stridor.  He has no skin rash over the face.    ED Course        Procedures               Critical Care time:  none               Results for orders placed or performed during the hospital encounter of 04/04/21 (from the past 24 hour(s))   XR Sinus 1/2 Views    Narrative    SINUS ONE TO TWO VIEWS  4/4/2021 3:00 PM     COMPARISON: None.    HISTORY: Left facial pain and congestion.    FINDINGS: There is no evidence for displaced facial bone fracture.  Nasal bones and bony orbits specifically are intact. There is  suggested mild circumferential polypoid membrane thickening in the  maxillary sinuses, right more than left. Left maxillary sinus  air-fluid level inferiorly is suggested, diminutive in morphology.  Ethmoid air cells and frontal sinuses are clear. Sphenoid sinus  appears clear. Otherwise negative.    ELIANA BARTHOLOMEW MD       Medications - No data to display    Assessments & Plan (with Medical Decision Making)   Roberto Guillen is a 41 year old male who presents with complaints of left facial pain and numbness.  Patient states that when he gets somewhat dehydrated his nose dries out.  He has been feeling congested so he has been blowing the nose quite vigorously.  No bloody or colored discharge.  No pain is on the left lateral nose and sinus area.  His teeth also hurt.  He denies any oral swelling or injury.  No difficulty speech or swallowing.  Denies any TMJ tenderness but the pain does radiate towards his ear currently.  No ear drainage.  Denies fever or chills.  Denies seasonal allergies.  This happened previously where he feels congestion and blows his nose vigorously.  He then experiences increased pain like something is pinched off.  He gets numb in the face around the sinus after that.  No history of migraine or ocular migraine.  He has no visual changes associated.  He has been immunized  against Covid.  No other upper respiratory symptoms such as change in taste or smell, sore throat, chest pain, shortness of breath or cough.  Can over-the-counter pain meds and decongestant without improvement.  Also tried Afrin spray and [place] pot without relief.  On presentation was afebrile vitals stable.  He has no ear infection.  No TMJ or oral lesions.  Nasal mucosal swelling to near occlusion.  Tenderness over the sinuses on the left.  X-ray does show air-fluid level as noted above.  Suspect this is because of his symptoms.  Recommend he continue [place] pot.  We will add antibiotic at this time for 10 days.  Put him on a Medrol pack for its anti-inflammatory properties.  He can use Afrin for a couple of days until the right are affected.  If he has persistent symptoms have him follow-up with ENT.  Contact information is provided.  I have reviewed the nursing notes.    I have reviewed the findings, diagnosis, plan and need for follow up with the patient.       New Prescriptions    METHYLPREDNISOLONE (MEDROL DOSEPAK) 4 MG TABLET THERAPY PACK    Follow Package Directions    SULFAMETHOXAZOLE-TRIMETHOPRIM (BACTRIM DS) 800-160 MG TABLET    Take 1 tablet by mouth 2 times daily for 10 days       Final diagnoses:   Left facial pain   Acute maxillary sinusitis, recurrence not specified       4/4/2021   [facility] EMERGENCY DEPT     Checo Ng MD  04/04/21 [id number]

## 2021-04-04 NOTE — DISCHARGE INSTRUCTIONS
I would continue the [place] pot.  Take Medrol Dosepak as directed.  Take this with food so you do not upset your stomach.  This can also cause some insomnia.  After few days it has anti-inflammatory effect should help with the nasal swelling and congestion.  You can use Afrin spray for 2 to 3 days until the steroids are affected.  Do not use it more than 3 days as it can become addictive.  Take Bactrim 1 tab twice daily for 10 days.  If you have worse or new concerning symptoms you can return for assessment.

## 2021-04-06 ENCOUNTER — TELEPHONE (OUTPATIENT)
Dept: INTERNAL MEDICINE | Facility: CLINIC | Age: 41
End: 2021-04-06

## 2021-04-06 LAB — TESTOST SERPL-MCNC: 100 NG/DL (ref 240–950)

## 2021-04-06 NOTE — TELEPHONE ENCOUNTER
Patient requesting results from his testosterone labs. Please advise. Hetal Smith MA     4/6/2021

## 2021-04-06 NOTE — TELEPHONE ENCOUNTER
Testosterone level is significantly lower than one would expect from a 41-year-old male.  He will need to set up an appointment to discuss further work-up and treatment options.    Felicia

## 2021-04-07 ENCOUNTER — OFFICE VISIT (OUTPATIENT)
Dept: INTERNAL MEDICINE | Facility: CLINIC | Age: 41
End: 2021-04-07
Payer: COMMERCIAL

## 2021-04-07 VITALS
RESPIRATION RATE: 10 BRPM | SYSTOLIC BLOOD PRESSURE: 132 MMHG | HEART RATE: 108 BPM | DIASTOLIC BLOOD PRESSURE: 80 MMHG | TEMPERATURE: 96.9 F | WEIGHT: 238 LBS | HEIGHT: 76 IN | BODY MASS INDEX: 28.98 KG/M2 | OXYGEN SATURATION: 96 %

## 2021-04-07 DIAGNOSIS — E29.1 PRIMARY MALE HYPOGONADISM: Primary | ICD-10-CM

## 2021-04-07 DIAGNOSIS — Z79.899 HIGH RISK MEDICATION USE: ICD-10-CM

## 2021-04-07 LAB — PSA SERPL-ACNC: 0.73 UG/L (ref 0–4)

## 2021-04-07 PROCEDURE — 99214 OFFICE O/P EST MOD 30 MIN: CPT | Performed by: INTERNAL MEDICINE

## 2021-04-07 PROCEDURE — G0103 PSA SCREENING: HCPCS | Performed by: INTERNAL MEDICINE

## 2021-04-07 PROCEDURE — 36415 COLL VENOUS BLD VENIPUNCTURE: CPT | Performed by: INTERNAL MEDICINE

## 2021-04-07 RX ORDER — LISINOPRIL/HYDROCHLOROTHIAZIDE 10-12.5 MG
1 TABLET ORAL
COMMUNITY
Start: 2021-03-10 | End: 2021-04-07

## 2021-04-07 RX ORDER — TESTOSTERONE CYPIONATE 200 MG/ML
200 INJECTION, SOLUTION INTRAMUSCULAR
Qty: 10 ML | Refills: 0 | Status: SHIPPED | OUTPATIENT
Start: 2021-04-07 | End: 2021-11-24

## 2021-04-07 RX ORDER — SYRINGE W-NEEDLE,DISPOSAB,3 ML 25GX5/8"
SYRINGE, EMPTY DISPOSABLE MISCELLANEOUS
Qty: 10 EACH | Refills: 1 | Status: SHIPPED | OUTPATIENT
Start: 2021-04-07 | End: 2023-11-10

## 2021-04-07 RX ORDER — AMLODIPINE BESYLATE 10 MG/1
TABLET ORAL
COMMUNITY
Start: 2019-07-09 | End: 2021-04-07

## 2021-04-07 ASSESSMENT — MIFFLIN-ST. JEOR: SCORE: 2089.57

## 2021-04-07 ASSESSMENT — PAIN SCALES - GENERAL: PAINLEVEL: NO PAIN (0)

## 2021-04-07 NOTE — PROGRESS NOTES
"   EMR reviewed including:             Complaint, History of Chief Complaint, Corresponding Review of Systems, and Complaint Specific Physical Examination.    #1   Hypogonadism.  Recent testosterone level was low at 100.  Ongoing history of depression.  Decreased libido. Some erectile dysfunction  Difficulty maintaining muscle mass.  Does have 1 child and 1 on the way.  Denies any testicular trauma or pain.  Thyroid levels and electrolytes normal suggesting no pituitary cancer       Exam:   GENITAL: Normal male genitalia is noted. Testicles are bilaterally present but potentially smaller than expected. No penile lesions are present.          #2   Recent sinusitis.  Concluded Medrol Dosepak, continues Bactrim.  Symptoms have improved.  Denies chills etc.      #3   History of hypertension.  Currently 132/80.  Continues 10 mg of Norvasc and Hyzaar 50/12.5 mg daily.  Tolerating medication without difficulty.       Exam:   LUNGS: clear bilaterally, airflow is brisk, no intercostal retraction or stridor is noted. No coughing is noted during visit.   HEART:  regular without rubs, clicks, gallops, or murmurs. PMI is nondisplaced. Upstrokes are brisk. S1,S2 are heard.      #4   Ongoing depression  Currently on Effexor with moderate results.  Patient suspects depression secondary to hypogonadism.  No suicidal ideation.       Exam:   PSYCH: The patient appears grossly appropriate. Maintains good eye contact, does not have any jittery or atypical motion. Displays appropriate affect.        Vital Signs:   /80   Pulse 108   Temp 96.9  F (36.1  C)   Resp 10   Ht 1.92 m (6' 3.59\")   Wt 108 kg (238 lb)   SpO2 96%   BMI 29.28 kg/m               Decision Making    Problem and Complexity     1. Primary male hypogonadism  We'll start on testosterone initially at monthly dosage.  We'll follow peak and trough levels and adjust dosage as well as following hematocrit and PSA.  Given normal thyroid and electrolytes, we'll not " "pursue pituitary causes at this time.  - testosterone cypionate (DEPOTESTOSTERONE) 200 MG/ML injection; Inject 1 mL (200 mg) into the muscle every 28 days  Dispense: 10 mL; Refill: 0  - syringe 23G X 1\" 3 ML MISC; Use 1 monthly  Dispense: 10 each; Refill: 1  - PSA, screen    2. High risk medication use    - PSA, screen          ------------------------------------------------------------------------------------------------------------------------------  Data    4=1/3 5=2/3    1  >3  Specialty (external) notes reviewed:   Emergency medicine notes reviewed    Tests ordered (by provider) reviewed:   Previous lab work reviewed     Tests ordered (by provider):   None    Independent Historians Interview:   None    2  Review of outside (other providers) Tests:   Yes    3   Verbal Discussion with Specialists:    None      ----------------------------------------------------------------------------------------------------------------------------------  Risk   Prescription drug management:   Yes                                High risk for toxicity:     Decision regarding surgery:    None     Social determinants of health:   No     Decision to withhold therapy:   None                                 FOLLOW UP   I have asked the patient to make an appointment for followup with me in about 4 months for peak and trough levels            I have carefully explained the diagnosis and treatment options to the patient.  The patient has displayed an understanding of the above, and all subsequent questions were answered.      Jose Matushin, DO FACOI    Portions of this note were produced using Stand In  Although every attempt at real-time proof reading has been made, occasional grammar/syntax errors may have been missed.          "

## 2021-04-08 ENCOUNTER — MYC MEDICAL ADVICE (OUTPATIENT)
Dept: INTERNAL MEDICINE | Facility: CLINIC | Age: 41
End: 2021-04-08

## 2021-04-08 DIAGNOSIS — E29.1 HYPOGONADISM MALE: Primary | ICD-10-CM

## 2021-04-14 ENCOUNTER — HOSPITAL ENCOUNTER (OUTPATIENT)
Dept: CT IMAGING | Facility: CLINIC | Age: 41
Discharge: HOME | End: 2021-04-14
Attending: INTERNAL MEDICINE | Admitting: INTERNAL MEDICINE
Payer: COMMERCIAL

## 2021-04-14 DIAGNOSIS — E29.1 HYPOGONADISM MALE: ICD-10-CM

## 2021-04-14 PROCEDURE — 70480 CT ORBIT/EAR/FOSSA W/O DYE: CPT

## 2021-04-16 ENCOUNTER — TELEPHONE (OUTPATIENT)
Dept: INTERNAL MEDICINE | Facility: CLINIC | Age: 41
End: 2021-04-16

## 2021-04-16 NOTE — TELEPHONE ENCOUNTER
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

## 2021-04-19 DIAGNOSIS — N52.2 DRUG-INDUCED ERECTILE DYSFUNCTION: ICD-10-CM

## 2021-04-19 RX ORDER — SILDENAFIL 100 MG/1
50-100 TABLET, FILM COATED ORAL DAILY PRN
Qty: 10 TABLET | Refills: 11 | Status: SHIPPED | OUTPATIENT
Start: 2021-04-19 | End: 2022-05-20

## 2021-04-19 NOTE — TELEPHONE ENCOUNTER
Central Prior Authorization Team   Phone: [phone]      PA Initiation    Medication: sildenafil (VIAGRA) 100 MG tablet  Insurance Company: [employer] - Phone [phone] Fax [phone]  Pharmacy Filling the Rx: [facility] - [place], [place] - [address] [place] [address]  Filling Pharmacy Phone: [phone]  Filling Pharmacy Fax:    Start Date: 4/19/2021

## 2021-04-19 NOTE — TELEPHONE ENCOUNTER
Prior Authorization Approval    Authorization Effective Date: 3/20/2021  Authorization Expiration Date: 4/19/2022  Medication: sildenafil (VIAGRA) 100 MG tablet  Approved Dose/Quantity:    Reference #:     Insurance Company: [employer] - Phone [phone] Fax [phone]  Expected CoPay:       CoPay Card Available:      Foundation Assistance Needed:    Which Pharmacy is filling the prescription (Not needed for infusion/clinic administered): [facility] [place], [place] - [address] [place] [address]  Pharmacy Notified: Yes  Patient Notified: Yes  **Instructed pharmacy to notify patient when script is ready to /ship.**

## 2021-06-25 DIAGNOSIS — E29.1 HYPOGONADISM MALE: Primary | ICD-10-CM

## 2021-06-28 DIAGNOSIS — E29.1 HYPOGONADISM MALE: ICD-10-CM

## 2021-06-28 LAB
ERYTHROCYTE [DISTWIDTH] IN BLOOD BY AUTOMATED COUNT: 12.4 % (ref 10–15)
FSH SERPL-ACNC: 5 IU/L (ref 0.7–10.8)
HCT VFR BLD AUTO: 45.9 % (ref 40–53)
HGB BLD-MCNC: 16.3 G/DL (ref 13.3–17.7)
LH SERPL-ACNC: 2.5 IU/L (ref 1.5–9.3)
MCH RBC QN AUTO: 30.9 PG (ref 26.5–33)
MCHC RBC AUTO-ENTMCNC: 35.5 G/DL (ref 31.5–36.5)
MCV RBC AUTO: 87 FL (ref 78–100)
PLATELET # BLD AUTO: 265 10E9/L (ref 150–450)
PSA SERPL-ACNC: 0.94 UG/L (ref 0–4)
RBC # BLD AUTO: 5.27 10E12/L (ref 4.4–5.9)
WBC # BLD AUTO: 5.6 10E9/L (ref 4–11)

## 2021-06-28 PROCEDURE — 85027 COMPLETE CBC AUTOMATED: CPT | Performed by: INTERNAL MEDICINE

## 2021-06-28 PROCEDURE — 83001 ASSAY OF GONADOTROPIN (FSH): CPT | Performed by: INTERNAL MEDICINE

## 2021-06-28 PROCEDURE — 99000 SPECIMEN HANDLING OFFICE-LAB: CPT | Performed by: INTERNAL MEDICINE

## 2021-06-28 PROCEDURE — 83002 ASSAY OF GONADOTROPIN (LH): CPT | Performed by: INTERNAL MEDICINE

## 2021-06-28 PROCEDURE — G0103 PSA SCREENING: HCPCS | Performed by: INTERNAL MEDICINE

## 2021-06-28 PROCEDURE — 84403 ASSAY OF TOTAL TESTOSTERONE: CPT | Mod: 90 | Performed by: INTERNAL MEDICINE

## 2021-06-28 PROCEDURE — 36415 COLL VENOUS BLD VENIPUNCTURE: CPT | Performed by: INTERNAL MEDICINE

## 2021-06-30 LAB — TESTOST SERPL-MCNC: 283 NG/DL (ref 240–950)

## 2021-07-03 DIAGNOSIS — E29.1 HYPOGONADISM MALE: ICD-10-CM

## 2021-07-03 LAB
ERYTHROCYTE [DISTWIDTH] IN BLOOD BY AUTOMATED COUNT: 12.1 % (ref 10–15)
HCT VFR BLD AUTO: 44.9 % (ref 40–53)
HGB BLD-MCNC: 16.2 G/DL (ref 13.3–17.7)
MCH RBC QN AUTO: 31.4 PG (ref 26.5–33)
MCHC RBC AUTO-ENTMCNC: 36.1 G/DL (ref 31.5–36.5)
MCV RBC AUTO: 87 FL (ref 78–100)
PLATELET # BLD AUTO: 265 10E9/L (ref 150–450)
PSA SERPL-ACNC: 0.89 UG/L (ref 0–4)
RBC # BLD AUTO: 5.16 10E12/L (ref 4.4–5.9)
WBC # BLD AUTO: 7.1 10E9/L (ref 4–11)

## 2021-07-03 PROCEDURE — 84403 ASSAY OF TOTAL TESTOSTERONE: CPT | Mod: 90 | Performed by: INTERNAL MEDICINE

## 2021-07-03 PROCEDURE — G0103 PSA SCREENING: HCPCS | Performed by: INTERNAL MEDICINE

## 2021-07-03 PROCEDURE — 36415 COLL VENOUS BLD VENIPUNCTURE: CPT | Performed by: INTERNAL MEDICINE

## 2021-07-03 PROCEDURE — 99000 SPECIMEN HANDLING OFFICE-LAB: CPT | Performed by: INTERNAL MEDICINE

## 2021-07-03 PROCEDURE — 85027 COMPLETE CBC AUTOMATED: CPT | Performed by: INTERNAL MEDICINE

## 2021-07-06 LAB — TESTOST SERPL-MCNC: 1094 NG/DL (ref 240–950)

## 2021-08-06 ENCOUNTER — MYC REFILL (OUTPATIENT)
Dept: FAMILY MEDICINE | Facility: CLINIC | Age: 41
End: 2021-08-06

## 2021-08-06 DIAGNOSIS — F40.10 SOCIAL PHOBIA: ICD-10-CM

## 2021-08-09 RX ORDER — LORAZEPAM 0.5 MG/1
TABLET ORAL
Qty: 90 TABLET | Refills: 0 | Status: SHIPPED | OUTPATIENT
Start: 2021-08-09 | End: 2022-01-22

## 2021-08-09 NOTE — TELEPHONE ENCOUNTER
Pending Prescriptions:                       Disp   Refills    LORazepam (ATIVAN) 0.5 MG tablet           90 tab*0        Sig: Take ONE to TWO tablets by mouth EVERY 6 HOURS, AS           NEEDED FOR ANXIETY.       Last Written Prescription Date:  03/25/2021  Last Fill Quantity: 90,   # refills: 0  Last Office Visit: 04/07/2021  Future Office visit:       Routing refill request to provider for review/approval because:  Drug not on the [facility] or [facility] refill protocol or controlled substance      Alexia Caicedo RN

## 2021-10-04 DIAGNOSIS — I10 ESSENTIAL HYPERTENSION: ICD-10-CM

## 2021-10-05 ENCOUNTER — E-VISIT (OUTPATIENT)
Dept: FAMILY MEDICINE | Facility: CLINIC | Age: 41
End: 2021-10-05
Payer: COMMERCIAL

## 2021-10-05 DIAGNOSIS — Z20.822 SUSPECTED COVID-19 VIRUS INFECTION: Primary | ICD-10-CM

## 2021-10-05 PROCEDURE — 99421 OL DIG E/M SVC 5-10 MIN: CPT | Performed by: INTERNAL MEDICINE

## 2021-10-06 ENCOUNTER — ALLIED HEALTH/NURSE VISIT (OUTPATIENT)
Dept: FAMILY MEDICINE | Facility: CLINIC | Age: 41
End: 2021-10-06
Payer: COMMERCIAL

## 2021-10-06 DIAGNOSIS — Z20.822 SUSPECTED COVID-19 VIRUS INFECTION: ICD-10-CM

## 2021-10-06 LAB — SARS-COV-2 RNA RESP QL NAA+PROBE: NEGATIVE

## 2021-10-06 PROCEDURE — U0003 INFECTIOUS AGENT DETECTION BY NUCLEIC ACID (DNA OR RNA); SEVERE ACUTE RESPIRATORY SYNDROME CORONAVIRUS 2 (SARS-COV-2) (CORONAVIRUS DISEASE [COVID-19]), AMPLIFIED PROBE TECHNIQUE, MAKING USE OF HIGH THROUGHPUT TECHNOLOGIES AS DESCRIBED BY CMS-2020-01-R: HCPCS

## 2021-10-06 PROCEDURE — U0005 INFEC AGEN DETEC AMPLI PROBE: HCPCS

## 2021-10-06 PROCEDURE — 99207 PR NO CHARGE NURSE ONLY: CPT

## 2021-10-06 RX ORDER — LOSARTAN POTASSIUM AND HYDROCHLOROTHIAZIDE 12.5; 5 MG/1; MG/1
1 TABLET ORAL DAILY
Qty: 90 TABLET | Refills: 1 | Status: SHIPPED | OUTPATIENT
Start: 2021-10-06 | End: 2022-04-01

## 2021-10-06 NOTE — PATIENT INSTRUCTIONS
Dear Roberto Guillen,    Your symptoms show that you may have coronavirus (COVID-19). This illness can cause fever, cough and trouble breathing. Many people get a mild case and get better on their own. Some people can get very sick.    Will I be tested for COVID-19?  We would like to test you for Covid-19 virus. I have placed orders for this test.     For all employees or close contacts (except [employer] and [employer] - see below), go to your [employer] home page and scroll down to the section that says  You have an appointment that needs to be scheduled  and click the large green button that says  Schedule Now  and follow the steps to find the next available opening.     If you are unable to complete these steps or if you cannot find any available times, please call [phone] to schedule employee testing.     [employer] employees or close contacts, please call [phone].   [employer]) employees or close contacts call [phone].    Return to work/school/ guidance:  Please let your workplace manager and staffing office know when your quarantine ends     We can t give you an exact date as it depends on the above. You can calculate this on your own or work with your manager/staffing office to calculate this. (For example if you were exposed on 10/4, you would have to quarantine for 14 full days. That would be through 10/18. You could return on 10/19.)      If you receive a positive COVID-19 test result, follow the guidance of the those who are giving you the results. Usually the return to work is 10 (or in some cases 20 days from symptom onset.) If you work at [employer], you must also be cleared by Employee Occupational Health and Safety to return to work.        If you receive a negative COVID-19 test result and did not have a high risk exposure to someone with a known positive COVID-19 test, you can return to work once you're free of fever for 24 hours without fever-reducing  medication and your symptoms are improving or resolved.      If you receive a negative COVID-19 test and If you had a high risk exposure to someone who has tested positive for COVID-19 then you can return to work 14 days after your last contact with the positive individual    Note: If you have ongoing exposure to the covid positive person, this quarantine period may be more than 14 days. (For example, if you are continued to be exposed to your child who tested positive and cannot isolate from them, then the quarantine of 7-14 days can't start until your child is no longer contagious. This is typically 10 days from onset of the child's symptoms. So the total duration may be 17-24 days in this case.)    Sign up for [employer].   We know it's scary to hear that you might have COVID-19. We want to track your symptoms to make sure you're okay over the next 2 weeks. Please look for an email from [employer]--this is a free, online program that we'll use to keep in touch. To sign up, follow the link in the email you will receive. Learn more at [email or web address]    How can I take care of myself?    Get lots of rest. Drink extra fluids (unless a doctor has told you not to)    Take Tylenol (acetaminophen) or ibuprofen for fever or pain. If you have liver or kidney problems, ask your family doctor if it's okay to take Tylenol o ibuprofen    If you have other health problems (like cancer, heart failure, an organ transplant or severe kidney disease): Call your specialty clinic if you don't feel better in the next 2 days.    Know when to call 911. Emergency warning signs include:  o Trouble breathing or shortness of breath  o Pain or pressure in the chest that doesn't go away  o Feeling confused like you haven't felt before, or not being able to wake up  o Bluish-colored lips or face    Where can I get more information?  [facility] - About COVID-19:   [email or web address]    CDC - What to Do  If You're Sick:   www.cdc.gov/coronavirus/2019-ncov/about/steps-when-sick.html

## 2021-10-07 ENCOUNTER — E-VISIT (OUTPATIENT)
Dept: INTERNAL MEDICINE | Facility: CLINIC | Age: 41
End: 2021-10-07
Payer: COMMERCIAL

## 2021-10-07 DIAGNOSIS — Z53.9 ERRONEOUS ENCOUNTER--DISREGARD: Primary | ICD-10-CM

## 2021-10-07 DIAGNOSIS — Z91.199 FAILURE TO ATTEND APPOINTMENT: ICD-10-CM

## 2021-10-08 ENCOUNTER — ALLIED HEALTH/NURSE VISIT (OUTPATIENT)
Dept: FAMILY MEDICINE | Facility: CLINIC | Age: 41
End: 2021-10-08
Payer: COMMERCIAL

## 2021-10-08 DIAGNOSIS — Z20.822 EXPOSURE TO 2019 NOVEL CORONAVIRUS: Primary | ICD-10-CM

## 2021-10-08 LAB — SARS-COV-2 RNA RESP QL NAA+PROBE: NEGATIVE

## 2021-10-08 PROCEDURE — 99207 PR NO CHARGE NURSE ONLY: CPT

## 2021-10-08 PROCEDURE — U0005 INFEC AGEN DETEC AMPLI PROBE: HCPCS

## 2021-10-08 PROCEDURE — U0003 INFECTIOUS AGENT DETECTION BY NUCLEIC ACID (DNA OR RNA); SEVERE ACUTE RESPIRATORY SYNDROME CORONAVIRUS 2 (SARS-COV-2) (CORONAVIRUS DISEASE [COVID-19]), AMPLIFIED PROBE TECHNIQUE, MAKING USE OF HIGH THROUGHPUT TECHNOLOGIES AS DESCRIBED BY CMS-2020-01-R: HCPCS

## 2021-10-08 NOTE — LETTER
October 12, 2021      Roberto Guillen  [address]  [place] [address]        Dear ,    We are writing to inform you of your test results.    Your Covid test is negative.     You will be contacted with any outstanding results as they are available.   Feel free to contact me via the office or My Chart if you have any questions regarding the above.     Jose Duarte DO     Resulted Orders   Asymptomatic COVID-19 Virus (Coronavirus) by PCR Nose   Result Value Ref [facility]    SARS CoV2 PCR Negative Negative      Comment:      NEGATIVE: SARS-CoV-2 (COVID-19) RNA not detected, presumed negative.    Narrative    Testing was performed using the Xpert Xpress SARS-CoV-2 Assay on the  Cepheid Gene-Xpert Instrument Systems. Additional information about  this Emergency Use Authorization (EUA) assay can be found via the Lab  Guide. This test should be ordered for the detection of SARS-CoV-2 in  individuals who meet SARS-CoV-2 clinical and/or epidemiological  criteria. Test performance is unknown in asymptomatic patients. This  test is for in vitro diagnostic use under the FDA EUA for  laboratories certified under CLIA to perform high complexity testing.  This test has not been FDA cleared or approved. A negative result  does not rule out the presence of PCR inhibitors in the specimen or  target RNA in concentration below the limit of detection for the  assay. The possibility of a false negative should be considered if  the patient's recent exposure or clinical presentation suggests  COVID-19. This test was validated by the [facility]. This laboratory is certified under  the Clinical Laboratory Improvement Amendments of 1988 (CLIA-88) as  qualified to perform high complexity laboratory testing.         If you have any questions or concerns, please call the clinic at the number listed above.

## 2021-10-18 NOTE — TELEPHONE ENCOUNTER
Provider E-Visit time total (minutes): 0    Felicia  This encounter was opened in error. Please disregard.

## 2021-10-24 ENCOUNTER — HEALTH MAINTENANCE LETTER (OUTPATIENT)
Age: 41
End: 2021-10-24

## 2021-11-12 ENCOUNTER — MYC MEDICAL ADVICE (OUTPATIENT)
Dept: INTERNAL MEDICINE | Facility: CLINIC | Age: 41
End: 2021-11-12
Payer: COMMERCIAL

## 2021-11-12 DIAGNOSIS — J01.00 ACUTE NON-RECURRENT MAXILLARY SINUSITIS: Primary | ICD-10-CM

## 2021-11-12 RX ORDER — CEFUROXIME AXETIL 500 MG/1
500 TABLET ORAL 2 TIMES DAILY
Qty: 14 TABLET | Refills: 1 | Status: SHIPPED | OUTPATIENT
Start: 2021-11-12 | End: 2022-09-30

## 2021-11-18 ENCOUNTER — IMMUNIZATION (OUTPATIENT)
Dept: FAMILY MEDICINE | Facility: CLINIC | Age: 41
End: 2021-11-18
Payer: COMMERCIAL

## 2021-11-18 DIAGNOSIS — Z23 HIGH PRIORITY FOR 2019-NCOV VACCINE: Primary | ICD-10-CM

## 2021-11-18 PROCEDURE — 91300 COVID-19,PF,PFIZER (12+ YRS): CPT

## 2021-11-18 PROCEDURE — 99207 PR NO CHARGE NURSE ONLY: CPT

## 2021-11-18 PROCEDURE — 0004A COVID-19,PF,PFIZER (12+ YRS): CPT

## 2021-11-24 ENCOUNTER — MYC REFILL (OUTPATIENT)
Dept: FAMILY MEDICINE | Facility: CLINIC | Age: 41
End: 2021-11-24
Payer: COMMERCIAL

## 2021-11-24 DIAGNOSIS — F98.8 ATTENTION DEFICIT DISORDER, UNSPECIFIED HYPERACTIVITY PRESENCE: ICD-10-CM

## 2021-11-24 DIAGNOSIS — E29.1 PRIMARY MALE HYPOGONADISM: ICD-10-CM

## 2021-11-24 NOTE — TELEPHONE ENCOUNTER
Testosterone  Routing refill request to provider for review/approval because:  Elevated BP    Adderall      Last Written Prescription Date:  01/05/2021  Last Fill Quantity: 90,   # refills: 0  Last Office Visit: 04/07/2021  Future Office visit:   None  Routing refill request to provider for review/approval because:  Drug not on the [facility], [facility] or [facility] refill protocol or controlled substance    Josey Miller Rn

## 2021-11-26 RX ORDER — TESTOSTERONE CYPIONATE 200 MG/ML
200 INJECTION, SOLUTION INTRAMUSCULAR
Qty: 10 ML | Refills: 0 | Status: SHIPPED | OUTPATIENT
Start: 2021-11-26 | End: 2022-02-24

## 2021-11-26 RX ORDER — DEXTROAMPHETAMINE SACCHARATE, AMPHETAMINE ASPARTATE MONOHYDRATE, DEXTROAMPHETAMINE SULFATE AND AMPHETAMINE SULFATE 5; 5; 5; 5 MG/1; MG/1; MG/1; MG/1
20 CAPSULE, EXTENDED RELEASE ORAL DAILY
Qty: 90 CAPSULE | Refills: 0 | Status: SHIPPED | OUTPATIENT
Start: 2021-11-26 | End: 2022-05-24

## 2022-01-02 DIAGNOSIS — F40.10 SOCIAL PHOBIA: ICD-10-CM

## 2022-01-02 DIAGNOSIS — G47.9 SLEEP DISORDER: ICD-10-CM

## 2022-01-03 RX ORDER — TRAZODONE HYDROCHLORIDE 100 MG/1
TABLET ORAL
Qty: 135 TABLET | Refills: 3 | Status: SHIPPED | OUTPATIENT
Start: 2022-01-03 | End: 2022-12-02

## 2022-01-12 DIAGNOSIS — F40.10 SOCIAL PHOBIA: ICD-10-CM

## 2022-01-12 DIAGNOSIS — F33.0 MILD RECURRENT MAJOR DEPRESSION (H): ICD-10-CM

## 2022-01-12 RX ORDER — VENLAFAXINE 37.5 MG/1
TABLET ORAL
Qty: 60 TABLET | Refills: 0 | Status: SHIPPED | OUTPATIENT
Start: 2022-01-12 | End: 2023-07-26

## 2022-01-21 ENCOUNTER — TELEPHONE (OUTPATIENT)
Dept: INTERNAL MEDICINE | Facility: CLINIC | Age: 42
End: 2022-01-21
Payer: COMMERCIAL

## 2022-01-21 DIAGNOSIS — A09 TRAVELER'S DIARRHEA: Primary | ICD-10-CM

## 2022-01-21 RX ORDER — CIPROFLOXACIN 500 MG/1
500 TABLET, FILM COATED ORAL 2 TIMES DAILY
Qty: 28 TABLET | Refills: 0 | Status: SHIPPED | OUTPATIENT
Start: 2022-01-21 | End: 2023-11-10

## 2022-01-21 NOTE — TELEPHONE ENCOUNTER
Patient (actually patients wife) is requesting a prescription for patient to take with on vacation to [place].  Patient would like to have Cipro to alleviate the affects of [place], should he contract it while on vacation.    Please send to [facility]    The plumbers in [place] thank you for your assistance in the matter.    Bertha Hendrickson XRO/

## 2022-01-22 ENCOUNTER — MYC REFILL (OUTPATIENT)
Dept: FAMILY MEDICINE | Facility: CLINIC | Age: 42
End: 2022-01-22
Payer: COMMERCIAL

## 2022-01-22 DIAGNOSIS — F40.10 SOCIAL PHOBIA: ICD-10-CM

## 2022-01-23 DIAGNOSIS — I10 ESSENTIAL HYPERTENSION: ICD-10-CM

## 2022-01-24 NOTE — TELEPHONE ENCOUNTER
LORazepam (ATIVAN) 0.5 MG tablet        Last Written Prescription Date:  8/9/21  Last Fill Quantity: 90,   # refills: 0  Last Office Visit: 4/7/21  Future Office visit:       Routing refill request to provider for review/approval because:  Drug not on the [facility], [facility] or [facility] refill protocol or controlled substance

## 2022-01-25 RX ORDER — AMLODIPINE BESYLATE 10 MG/1
10 TABLET ORAL DAILY
Qty: 90 TABLET | Refills: 0 | Status: SHIPPED | OUTPATIENT
Start: 2022-01-25 | End: 2022-04-01

## 2022-01-25 RX ORDER — LORAZEPAM 0.5 MG/1
TABLET ORAL
Qty: 90 TABLET | Refills: 0 | Status: SHIPPED | OUTPATIENT
Start: 2022-01-25 | End: 2022-09-20

## 2022-02-23 DIAGNOSIS — E29.1 PRIMARY MALE HYPOGONADISM: ICD-10-CM

## 2022-02-24 RX ORDER — TESTOSTERONE CYPIONATE 200 MG/ML
200 INJECTION, SOLUTION INTRAMUSCULAR
Qty: 10 ML | Refills: 0 | Status: SHIPPED | OUTPATIENT
Start: 2022-02-24 | End: 2022-09-20

## 2022-03-11 DIAGNOSIS — R11.2 NAUSEA WITH VOMITING: Primary | ICD-10-CM

## 2022-03-11 RX ORDER — ONDANSETRON 4 MG/1
4 TABLET, ORALLY DISINTEGRATING ORAL EVERY 8 HOURS PRN
Qty: 20 TABLET | Refills: 3 | Status: SHIPPED | OUTPATIENT
Start: 2022-03-11 | End: 2023-11-10

## 2022-03-11 NOTE — TELEPHONE ENCOUNTER
Wife called, patient has the stomach flu and unable to keep anything down.  Patient is the last in the family to be sick.     VOR[facility] from [facility] for Zofran 4mg Q8 hours PRN.     Script sent to pharmacy.     Josey Miller Rn

## 2022-04-01 ENCOUNTER — MYC REFILL (OUTPATIENT)
Dept: INTERNAL MEDICINE | Facility: CLINIC | Age: 42
End: 2022-04-01
Payer: COMMERCIAL

## 2022-04-01 ENCOUNTER — TELEPHONE (OUTPATIENT)
Dept: INTERNAL MEDICINE | Facility: CLINIC | Age: 42
End: 2022-04-01
Payer: COMMERCIAL

## 2022-04-01 DIAGNOSIS — I10 ESSENTIAL HYPERTENSION: ICD-10-CM

## 2022-04-01 DIAGNOSIS — J02.0 STREPTOCOCCAL PHARYNGITIS: Primary | ICD-10-CM

## 2022-04-01 DIAGNOSIS — F33.0 MILD RECURRENT MAJOR DEPRESSION (H): ICD-10-CM

## 2022-04-01 DIAGNOSIS — F40.10 SOCIAL PHOBIA: ICD-10-CM

## 2022-04-01 RX ORDER — AMLODIPINE BESYLATE 10 MG/1
10 TABLET ORAL DAILY
Qty: 90 TABLET | Refills: 2 | Status: SHIPPED | OUTPATIENT
Start: 2022-04-01 | End: 2023-02-01

## 2022-04-01 RX ORDER — AMOXICILLIN 500 MG/1
500 CAPSULE ORAL 3 TIMES DAILY
Qty: 21 CAPSULE | Refills: 0 | Status: SHIPPED | OUTPATIENT
Start: 2022-04-01 | End: 2023-11-10

## 2022-04-01 RX ORDER — VENLAFAXINE HYDROCHLORIDE 75 MG/1
225 CAPSULE, EXTENDED RELEASE ORAL DAILY
Qty: 270 CAPSULE | Refills: 3 | Status: SHIPPED | OUTPATIENT
Start: 2022-04-01 | End: 2023-03-29

## 2022-04-01 RX ORDER — LOSARTAN POTASSIUM AND HYDROCHLOROTHIAZIDE 12.5; 5 MG/1; MG/1
1 TABLET ORAL DAILY
Qty: 90 TABLET | Refills: 1 | Status: SHIPPED | OUTPATIENT
Start: 2022-04-01 | End: 2022-09-28

## 2022-04-01 NOTE — TELEPHONE ENCOUNTER
Norvasc  Prescription approved per [facility] Refill Protocol.    Kelin Antony RN

## 2022-04-01 NOTE — TELEPHONE ENCOUNTER
Hyzaar  Effexor XR  Routing refill request to provider for review/approval because:  Labs not current:  CR, Potassium, NA  PHQ9 failed RN protocol    Kelin Antony, RN

## 2022-04-10 ENCOUNTER — HEALTH MAINTENANCE LETTER (OUTPATIENT)
Age: 42
End: 2022-04-10

## 2022-05-20 DIAGNOSIS — N52.2 DRUG-INDUCED ERECTILE DYSFUNCTION: ICD-10-CM

## 2022-05-20 RX ORDER — SILDENAFIL 100 MG/1
50-100 TABLET, FILM COATED ORAL DAILY PRN
Qty: 10 TABLET | Refills: 11 | Status: SHIPPED | OUTPATIENT
Start: 2022-05-20 | End: 2023-03-14

## 2022-05-24 ENCOUNTER — MYC REFILL (OUTPATIENT)
Dept: FAMILY MEDICINE | Facility: CLINIC | Age: 42
End: 2022-05-24
Payer: COMMERCIAL

## 2022-05-24 DIAGNOSIS — F98.8 ATTENTION DEFICIT DISORDER, UNSPECIFIED HYPERACTIVITY PRESENCE: ICD-10-CM

## 2022-05-24 NOTE — TELEPHONE ENCOUNTER
Adderall      Last Written Prescription Date:  11/26/21  Last Fill Quantity: 90,   # refills: 0  Last Office Visit: 4/7/2021  Future Office visit:       Routing refill request to provider for review/approval because:  Drug not on the [facility], [facility] or [facility] refill protocol or controlled substance    Linus Gupta RN

## 2022-05-25 RX ORDER — DEXTROAMPHETAMINE SACCHARATE, AMPHETAMINE ASPARTATE MONOHYDRATE, DEXTROAMPHETAMINE SULFATE AND AMPHETAMINE SULFATE 5; 5; 5; 5 MG/1; MG/1; MG/1; MG/1
20 CAPSULE, EXTENDED RELEASE ORAL DAILY
Qty: 90 CAPSULE | Refills: 0 | Status: SHIPPED | OUTPATIENT
Start: 2022-05-25 | End: 2022-12-26

## 2022-08-16 ENCOUNTER — TELEPHONE (OUTPATIENT)
Dept: INTERNAL MEDICINE | Facility: CLINIC | Age: 42
End: 2022-08-16

## 2022-08-16 NOTE — TELEPHONE ENCOUNTER
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

## 2022-08-17 NOTE — TELEPHONE ENCOUNTER
Central Prior Authorization Team  Phone: [phone]    PA Initiation    Medication: sildenafil (VIAGRA) 100 MG tablet  Insurance Company: [employer] - Phone [phone] Fax [phone]  Pharmacy Filling the Rx: [facility] - [place], [place] - [address] [place] [address]  Filling Pharmacy Phone: [phone]  Filling Pharmacy Fax:    Start Date: 8/17/2022

## 2022-08-17 NOTE — TELEPHONE ENCOUNTER
Prior Authorization Approval    Authorization Effective Date: 7/18/2022  Authorization Expiration Date: 8/17/2023  Medication: sildenafil (VIAGRA) 100 MG tablet- APPROVED   Approved Dose/Quantity:   Reference #:     Insurance Company: [employer] - Phone [phone] Fax [phone]  Expected CoPay:       CoPay Card Available:      Foundation Assistance Needed:    Which Pharmacy is filling the prescription (Not needed for infusion/clinic administered): [facility] - [place], [place] - [address] [place] [address]  Pharmacy Notified: Yes  Patient Notified:  **Instructed pharmacy to notify patient when script is ready to /ship.**

## 2022-09-16 DIAGNOSIS — E29.1 PRIMARY MALE HYPOGONADISM: ICD-10-CM

## 2022-09-20 ENCOUNTER — MYC REFILL (OUTPATIENT)
Dept: FAMILY MEDICINE | Facility: CLINIC | Age: 42
End: 2022-09-20

## 2022-09-20 DIAGNOSIS — F40.10 SOCIAL PHOBIA: ICD-10-CM

## 2022-09-20 RX ORDER — TESTOSTERONE CYPIONATE 200 MG/ML
INJECTION, SOLUTION INTRAMUSCULAR
Qty: 3 ML | Refills: 0 | Status: SHIPPED | OUTPATIENT
Start: 2022-09-20 | End: 2023-05-16

## 2022-09-20 NOTE — TELEPHONE ENCOUNTER
Requested Prescriptions   Pending Prescriptions Disp Refills    testosterone cypionate (DEPOTESTOSTERONE) 200 MG/ML injection [Pharmacy Med Name: Testosterone Cypionate 200 MG/ML Intramuscular Solution] 3 mL 0     Sig: INJECT 1ML INTO THE MUSCLE EVERY 28 DAYS        Androgen Agents Failed - 9/16/2022  7:07 AM        Failed - Lipid panel on file in past 12 mos       Recent Labs   Lab Test 03/10/21  1615   CHOL 215*   TRIG 211*   HDL 46   *   NHDL 169*               Failed - ALT on file within past 12 mos     Recent Labs   Lab Test 03/10/21  1615   ALT 73*               Failed - HCT less than 54% on file within past 12 mos       Recent Labs   Lab Test 07/03/21  0927   HCT 44.9             Failed - Serum Testosterone on file within past 12 mos     Recent Labs   Lab Test 07/03/21  0927   TESTOSTTOTAL 1,094*               Failed - Serum PSA on file within past 12 mos     Lab Results   Component Value Date    PSA 0.89 07/03/2021               Failed - Refills for this classification require provider review        Failed - Blood pressure under 140/90 in past 6 months       BP Readings from Last 3 Encounters:   04/07/21 132/80   04/04/21 (!) 148/105   03/10/21 (!) 156/92                 Failed - Recent (6 mo) or future (30 days) visit within the authorizing provider's specialty        Failed - AST on file within past 12 mos       Recent Labs   Lab Test 03/10/21  1615   AST 29             Passed - Patient is of age 12 and older        Passed - Medication is active on med list        Passed - Patient is not pregnant        Passed - No positive pregnancy test on file within past 12 mos              Fabi Luu, RAFAELN, RN

## 2022-09-23 RX ORDER — LORAZEPAM 0.5 MG/1
TABLET ORAL
Qty: 90 TABLET | Refills: 0 | Status: SHIPPED | OUTPATIENT
Start: 2022-09-23 | End: 2022-11-07

## 2022-09-23 NOTE — TELEPHONE ENCOUNTER
Requested Prescriptions   Pending Prescriptions Disp Refills     LORazepam (ATIVAN) 0.5 MG tablet 90 tablet 0     Sig: Take ONE to TWO tablets by mouth EVERY 6 HOURS, AS NEEDED FOR ANXIETY.       Routing refill request to provider for review/approval because:  Drug not on the [facility], [facility] or [facility] refill protocol or controlled substance

## 2022-09-26 DIAGNOSIS — I10 ESSENTIAL HYPERTENSION: ICD-10-CM

## 2022-09-28 RX ORDER — LOSARTAN POTASSIUM AND HYDROCHLOROTHIAZIDE 12.5; 5 MG/1; MG/1
1 TABLET ORAL DAILY
Qty: 90 TABLET | Refills: 1 | Status: SHIPPED | OUTPATIENT
Start: 2022-09-28 | End: 2023-03-29

## 2022-09-28 NOTE — TELEPHONE ENCOUNTER
"Requested Prescriptions   Pending Prescriptions Disp Refills    losartan-hydrochlorothiazide (HYZAAR) 50-12.5 MG tablet [Pharmacy Med Name: losartan 50 mg-hydrochlorothiazide 12.5 mg tablet (HYZAAR)] 90 tablet 1     Sig: Take 1 tablet by mouth daily        Angiotensin-II Receptors Failed - 9/26/2022  5:15 PM        Failed - Last blood pressure under 140/90 in past 12 months       BP Readings from Last 3 Encounters:   04/07/21 132/80   04/04/21 (!) 148/105   03/10/21 (!) 156/92                 Failed - Normal serum creatinine on file in past 12 months     Recent Labs   Lab Test 03/10/21  1615   CR 0.74       Ok to refill medication if creatinine is low          Failed - Normal serum potassium on file in past 12 months       Recent Labs   Lab Test 03/10/21  1615   POTASSIUM 3.7                    Passed - Recent (12 mo) or future (30 days) visit within the authorizing provider's specialty     Patient has had an office visit with the authorizing provider or a provider within the authorizing providers department within the previous 12 mos or has a future within next 30 days. See \"Patient Info\" tab in inbasket, or \"Choose Columns\" in Meds & Orders section of the refill encounter.              Passed - Medication is active on med list        Passed - Patient is age 18 or older       Diuretics (Including Combos) Protocol Failed - 9/26/2022  5:15 PM        Failed - Blood pressure under 140/90 in past 12 months       BP Readings from Last 3 Encounters:   04/07/21 132/80   04/04/21 (!) 148/105   03/10/21 (!) 156/92                 Failed - Normal serum creatinine on file in past 12 months       Recent Labs   Lab Test 03/10/21  1615   CR 0.74              Failed - Normal serum potassium on file in past 12 months       Recent Labs   Lab Test 03/10/21  1615   POTASSIUM 3.7                    Failed - Normal serum sodium on file in past 12 months       Recent Labs   Lab Test 03/10/21  1615                 Passed - Recent " "(12 mo) or future (30 days) visit within the authorizing provider's specialty     Patient has had an office visit with the authorizing provider or a provider within the authorizing providers department within the previous 12 mos or has a future within next 30 days. See \"Patient Info\" tab in inbasket, or \"Choose Columns\" in Meds & Orders section of the refill encounter.              Passed - Medication is active on med list        Passed - Patient is age 18 or older              LAURA Clifford, RN     "

## 2022-09-30 DIAGNOSIS — J01.00 ACUTE NON-RECURRENT MAXILLARY SINUSITIS: ICD-10-CM

## 2022-09-30 RX ORDER — CEFUROXIME AXETIL 500 MG/1
500 TABLET ORAL 2 TIMES DAILY
Qty: 14 TABLET | Refills: 1 | Status: SHIPPED | OUTPATIENT
Start: 2022-09-30 | End: 2023-11-10

## 2022-09-30 NOTE — TELEPHONE ENCOUNTER
Requested Prescriptions   Pending Prescriptions Disp Refills    cefuroxime (CEFTIN) 500 MG tablet [Pharmacy Med Name: cefuroxime axetil 500 mg tablet (CEFTIN)] 14 tablet 1     Sig: Take 1 tablet (500 mg) by mouth 2 times daily        There is no refill protocol information for this order           RAFAEL CliffordN, RN

## 2022-10-04 NOTE — PROGRESS NOTES
"ENT Consultation    Roberto Guillen who is a 42 year old male seen in consultation at the request of self.      History of Present Illness - Roberto Guillen is a 42 year old male presents for evaluation of nasal and sinus issues.  Apparently he previously had septoplasty done number of years ago with turbinate reduction.  However he feels when he blows his nose his left side of the face often goes \"numb\" and experiences pressure tenderness.  When he Valsalva's usually he brings up a lot of secretions often yellowish or greenish.  The nose feels better.  He can breathe well through his nose otherwise.  Sense of smell appears to be intact.  Twice yearly he takes antibiotics for 10 days.  Which appear to help him.  He has tried saline but never tried nasal steroid sprays.  Apparently recently has been on Cipro for sinus infection.  He is still taking.  He does have seasonal allergies but was never officially tested.  His sense of smell appears to be intact.    Patient previously had CT of the sella area which did show some sinus cuts and revealed the chronic inflammation in the sphenoid sinuses bilaterally anterior posterior ethmoids there is scattered not complete opacifying and a rim of sinus enhancement and inflammation in the maxillary sinuses.  That CT was done a year and a half ago.  He feels that his symptoms have intensified since.    Patient also lately has had more heartburn yet denies any voice changes.  He does endorse increased phlegm formation and little bit of globus sensation.  He has not been treated for reflux.  BP Readings from Last 1 Encounters:   04/07/21 132/80       BP noted to be well controlled today in office.     Roberto IS a smoker/not using chewing tobacco.  Roberto is not ready to quit      Past Medical History -   Past Medical History:   Diagnosis Date     Genital warts 8/12/2010     Lipoma     right leg, excised     Moderate recurrent major depression (H) 7/22/2009    Dx age  21 " "    Social phobia 7/22/2009       Current Medications -   Current Outpatient Medications:      amLODIPine (NORVASC) 10 MG tablet, Take 1 tablet (10 mg) by mouth daily, Disp: 90 tablet, Rfl: 2     LORazepam (ATIVAN) 0.5 MG tablet, Take ONE to TWO tablets by mouth EVERY 6 HOURS, AS NEEDED FOR ANXIETY., Disp: 90 tablet, Rfl: 0     losartan-hydrochlorothiazide (HYZAAR) 50-12.5 MG tablet, Take 1 tablet by mouth daily, Disp: 90 tablet, Rfl: 1     amoxicillin (AMOXIL) 500 MG capsule, Take 1 capsule (500 mg) by mouth 3 times daily, Disp: 21 capsule, Rfl: 0     amphetamine-dextroamphetamine (ADDERALL XR) 20 MG 24 hr capsule, Take 1 capsule (20 mg) by mouth daily, Disp: 90 capsule, Rfl: 0     cefuroxime (CEFTIN) 500 MG tablet, Take 1 tablet (500 mg) by mouth 2 times daily, Disp: 14 tablet, Rfl: 1     ciprofloxacin (CIPRO) 500 MG tablet, Take 1 tablet (500 mg) by mouth 2 times daily, Disp: 28 tablet, Rfl: 0     ondansetron (ZOFRAN-ODT) 4 MG ODT tab, Take 1 tablet (4 mg) by mouth every 8 hours as needed for nausea, Disp: 20 tablet, Rfl: 3     sildenafil (VIAGRA) 100 MG tablet, Take 0.5-1 tablets ( mg) by mouth daily as needed (for erectile dysfunction), Disp: 10 tablet, Rfl: 11     syringe 23G X 1\" 3 ML MISC, Use 1 monthly, Disp: 10 each, Rfl: 1     testosterone cypionate (DEPOTESTOSTERONE) 200 MG/ML injection, INJECT 1ML INTO THE MUSCLE EVERY 28 DAYS, Disp: 3 mL, Rfl: 0     traZODone (DESYREL) 100 MG tablet, Take one and one-half tablets (150 mg) by mouth at bedtime for sleep, Disp: 135 tablet, Rfl: 3     valACYclovir (VALTREX) 1000 mg tablet, Take  by mouth. 2 Tabs bid x1 day for cold sore outbreak. 4 tablets for treatment., Disp: 28 tablet, Rfl: 2     venlafaxine (EFFEXOR) 37.5 MG tablet, One tablet to be taken if extended release Effexor is missed (to help alleviate side effects), Disp: 60 tablet, Rfl: 0     venlafaxine (EFFEXOR-XR) 75 MG 24 hr capsule, Take 3 capsules (225 mg) by mouth daily, Disp: 270 capsule, Rfl: " 3    Allergies - No Known Allergies    Social History -   Social History     Socioeconomic History     Marital status: Single   Tobacco Use     Smoking status: Current Some Day Smoker     Packs/day: 0.50     Last attempt to quit: 2013     Years since quittin.7     Smokeless tobacco: Never Used   Substance and Sexual Activity     Alcohol use: Yes     Drug use: No     Sexual activity: Yes     Partners: Female     Birth control/protection: Pill   Other Topics Concern     Parent/sibling w/ CABG, MI or angioplasty before 65F 55M? No       Family History -   Family History   Problem Relation Age of Onset     Depression Sister      Depression Mother      Diabetes Mother         borderline     Hypertension Mother      Prostate Cancer Father 60     Lipids Father      Psychotic Disorder Maternal Uncle         suicide     Cancer - colorectal No family hx of        Review of Systems - As per HPI and PMHx, otherwise review of system review of the head and neck negative. Otherwise 10+ review of system is negative    Physical Exam  There were no vitals taken for this visit.  BMI: There is no height or weight on file to calculate BMI.    General - The patient is well nourished and well developed, and appears to have good nutritional status.  Alert and oriented to person and place, answers questions and cooperates with examination appropriately.    SKIN - No suspicious lesions or rashes.  Respiration - No respiratory distress.  Head and Face - Normocephalic and atraumatic, with no gross asymmetry noted of the contour of the facial features.  The facial nerve is intact, with strong symmetric movements.    Voice and Breathing - The patient was breathing comfortably without the use of accessory muscles. The patients voice was clear and strong, and had appropriate pitch and quality.    Ears - Bilateral pinna and EACs with normal appearing overlying skin. Tympanic membrane intact with good mobility on pneumatic otoscopy  bilaterally. Bony landmarks of the ossicular chain are normal. The tympanic membranes are normal in appearance. No retraction, perforation, or masses.  No fluid or purulence was seen in the external canal or the middle ear.     Eyes - Extraocular movements intact.  Sclera were not icteric or injected, conjunctiva were pink and moist.    Mouth - Examination of the oral cavity showed pink, healthy oral mucosa. No lesions or ulcerations noted.  The tongue was mobile and midline, and the dentition were in good condition.      Throat - The walls of the oropharynx were smooth, pink, moist, symmetric, and had no lesions or ulcerations.  The tonsillar pillars and soft palate were symmetric.  The uvula was midline on elevation.    Neck - Normal midline excursion of the laryngotracheal complex during swallowing.  Full range of motion on passive movement.  Palpation of the occipital, submental, submandibular, internal jugular chain, and supraclavicular nodes did not demonstrate any abnormal lymph nodes or masses.  The carotid pulse was palpable bilaterally.  Palpation of the thyroid was soft and smooth, with no nodules or goiter appreciated.  The trachea was mobile and midline.    Nose - External contour is symmetric, no gross deflection or scars.  Nasal mucosa is pink and moist with no abnormal mucus.  The septum was midline and non-obstructive, turbinates of normal size and position.  No polyps, masses, or purulence noted on examination.    Neuro - Nonfocal neuro exam is normal, CN 2 through 12 intact, normal gait and muscle tone.      Performed in clinic today:  Attempts at mirror laryngoscopy were not possible due to gag reflex.  Therefore I proceeded with a fiberoptic examination.  First I sprayed both sides of the nose with a mixture of lidocaine and neosynephrine.  I then passed the scope through the nasal cavity.  The nasal cavity was unremarkable.  Also looking at each middle meatus bilaterally the fiberoptic scope  right left side I see normal appearing middle turbinate-side as well as the lateral wall of the nose.  No polyps lesions noted.  The nasopharynx was mucosally covered and symmetric.  The Eustachian tube openings were unobstructed.  Going further down I had a clear view of the base of tongue which enlarged slightly erythematous but symmetrical appearing lingual tonsillar tissue.  The base of tongue was free of lesions, and the vallecula was open.  The epiglottis was smooth and mucosally covered.  The supraglottic larynx was then clearly visualized.  The vocal cords moved smoothly and symmetrically, they were pearly white and no lesions were seen.  The pyriform sinuses were open, and the limited view of the postcricoid region did not show any lesions.  Mild erythema seen of the arytenoid cartilages bilaterally.  [facility] - [id number] Optim ENTity      A/P - Roberto Guillen is a 42 year old male with what appears to be chronic sinusitis but rather unusual set of symptoms in regard to the pain involving mostly left side of the nose left maxillary area and the hypoesthesia that is temporary.  No significant more neurologic symptoms.  Yet he feels that as soon as he expels some secretions it immediately improves.  We will start him on the cefdinir for period of 3 more weeks since he already has done about a week of oral Cipro which we will stop.  Also will start fluticasone use at least once daily preferably than twice daily.  He can use saline in conjunction.  Collect repeat CT of the sinuses in particular to look at the anatomy and pathology of his sinuses.  Also counseled patient on the management of laryngopharyngeal reflux issues which may be contributing to lingual tonsillitis enlarged lingual tonsil mild erythema of the posterior retinoids.  Certainly smoking can also enlarge lingual tonsil as well.  Smoking cessation was discussed.  He will work with his primary care doctor towards the goal.  Also we  discussed starting on omeprazole 40 mg before supper as well as lifestyle changes that should include avoidance of late-night eating, avoidance of fatty greasy foods in the evening, avoidance of caffeinated products in the carbonated products in the evening and possibly torso elevation at night.  We will see the patient back in about 5 to 6 weeks    Vasiliy Ervin MD

## 2022-10-06 ENCOUNTER — OFFICE VISIT (OUTPATIENT)
Dept: OTOLARYNGOLOGY | Facility: CLINIC | Age: 42
End: 2022-10-06
Payer: COMMERCIAL

## 2022-10-06 VITALS
SYSTOLIC BLOOD PRESSURE: 124 MMHG | DIASTOLIC BLOOD PRESSURE: 84 MMHG | WEIGHT: 231 LBS | TEMPERATURE: 98.4 F | BODY MASS INDEX: 28.13 KG/M2 | HEIGHT: 76 IN

## 2022-10-06 DIAGNOSIS — K21.9 LPRD (LARYNGOPHARYNGEAL REFLUX DISEASE): ICD-10-CM

## 2022-10-06 DIAGNOSIS — R09.A2 GLOBUS SENSATION: ICD-10-CM

## 2022-10-06 DIAGNOSIS — J32.8 OTHER CHRONIC SINUSITIS: Primary | ICD-10-CM

## 2022-10-06 PROCEDURE — 99204 OFFICE O/P NEW MOD 45 MIN: CPT | Mod: 25 | Performed by: OTOLARYNGOLOGY

## 2022-10-06 PROCEDURE — 31575 DIAGNOSTIC LARYNGOSCOPY: CPT | Performed by: OTOLARYNGOLOGY

## 2022-10-06 RX ORDER — FLUTICASONE PROPIONATE 50 MCG
2 SPRAY, SUSPENSION (ML) NASAL DAILY
Qty: 16 G | Refills: 1 | Status: SHIPPED | OUTPATIENT
Start: 2022-10-06 | End: 2022-11-05

## 2022-10-06 RX ORDER — CEFDINIR 300 MG/1
300 CAPSULE ORAL 2 TIMES DAILY
Qty: 40 CAPSULE | Refills: 0 | Status: SHIPPED | OUTPATIENT
Start: 2022-10-06 | End: 2022-10-26

## 2022-10-06 RX ORDER — OMEPRAZOLE 40 MG/1
40 CAPSULE, DELAYED RELEASE ORAL DAILY
Qty: 30 CAPSULE | Refills: 1 | Status: SHIPPED | OUTPATIENT
Start: 2022-10-06 | End: 2022-11-05

## 2022-10-06 NOTE — LETTER
"    10/6/2022         RE: Roberto Guillen  [address] [facility] [address]        Dear Colleague,    Thank you for referring your patient, Roberto Guillen, to the [facility]. Please see a copy of my visit note below.    ENT Consultation    Roberto Guillen who is a 42 year old male seen in consultation at the request of self.      History of Present Illness - Roberto Guillen is a 42 year old male presents for evaluation of nasal and sinus issues.  Apparently he previously had septoplasty done number of years ago with turbinate reduction.  However he feels when he blows his nose his left side of the face often goes \"numb\" and experiences pressure tenderness.  When he Valsalva's usually he brings up a lot of secretions often yellowish or greenish.  The nose feels better.  He can breathe well through his nose otherwise.  Sense of smell appears to be intact.  Twice yearly he takes antibiotics for 10 days.  Which appear to help him.  He has tried saline but never tried nasal steroid sprays.  Apparently recently has been on Cipro for sinus infection.  He is still taking.  He does have seasonal allergies but was never officially tested.  His sense of smell appears to be intact.    Patient previously had CT of the sella area which did show some sinus cuts and revealed the chronic inflammation in the sphenoid sinuses bilaterally anterior posterior ethmoids there is scattered not complete opacifying and a rim of sinus enhancement and inflammation in the maxillary sinuses.  That CT was done a year and a half ago.  He feels that his symptoms have intensified since.    Patient also lately has had more heartburn yet denies any voice changes.  He does endorse increased phlegm formation and little bit of globus sensation.  He has not been treated for reflux.  BP Readings from Last 1 Encounters:   04/07/21 132/80       BP noted to be well controlled today in office.     Roberto IS " "a smoker/not using chewing tobacco.  Roberto is not ready to quit      Past Medical History -   Past Medical History:   Diagnosis Date     Genital warts 8/12/2010     Lipoma     right leg, excised     Moderate recurrent major depression (H) 7/22/2009    Dx age  21     Social phobia 7/22/2009       Current Medications -   Current Outpatient Medications:      amLODIPine (NORVASC) 10 MG tablet, Take 1 tablet (10 mg) by mouth daily, Disp: 90 tablet, Rfl: 2     LORazepam (ATIVAN) 0.5 MG tablet, Take ONE to TWO tablets by mouth EVERY 6 HOURS, AS NEEDED FOR ANXIETY., Disp: 90 tablet, Rfl: 0     losartan-hydrochlorothiazide (HYZAAR) 50-12.5 MG tablet, Take 1 tablet by mouth daily, Disp: 90 tablet, Rfl: 1     amoxicillin (AMOXIL) 500 MG capsule, Take 1 capsule (500 mg) by mouth 3 times daily, Disp: 21 capsule, Rfl: 0     amphetamine-dextroamphetamine (ADDERALL XR) 20 MG 24 hr capsule, Take 1 capsule (20 mg) by mouth daily, Disp: 90 capsule, Rfl: 0     cefuroxime (CEFTIN) 500 MG tablet, Take 1 tablet (500 mg) by mouth 2 times daily, Disp: 14 tablet, Rfl: 1     ciprofloxacin (CIPRO) 500 MG tablet, Take 1 tablet (500 mg) by mouth 2 times daily, Disp: 28 tablet, Rfl: 0     ondansetron (ZOFRAN-ODT) 4 MG ODT tab, Take 1 tablet (4 mg) by mouth every 8 hours as needed for nausea, Disp: 20 tablet, Rfl: 3     sildenafil (VIAGRA) 100 MG tablet, Take 0.5-1 tablets ( mg) by mouth daily as needed (for erectile dysfunction), Disp: 10 tablet, Rfl: 11     syringe 23G X 1\" 3 ML MISC, Use 1 monthly, Disp: 10 each, Rfl: 1     testosterone cypionate (DEPOTESTOSTERONE) 200 MG/ML injection, INJECT 1ML INTO THE MUSCLE EVERY 28 DAYS, Disp: 3 mL, Rfl: 0     traZODone (DESYREL) 100 MG tablet, Take one and one-half tablets (150 mg) by mouth at bedtime for sleep, Disp: 135 tablet, Rfl: 3     valACYclovir (VALTREX) 1000 mg tablet, Take  by mouth. 2 Tabs bid x1 day for cold sore outbreak. 4 tablets for treatment., Disp: 28 tablet, Rfl: 2     " venlafaxine (EFFEXOR) 37.5 MG tablet, One tablet to be taken if extended release Effexor is missed (to help alleviate side effects), Disp: 60 tablet, Rfl: 0     venlafaxine (EFFEXOR-XR) 75 MG 24 hr capsule, Take 3 capsules (225 mg) by mouth daily, Disp: 270 capsule, Rfl: 3    Allergies - No Known Allergies    Social History -   Social History     Socioeconomic History     Marital status: Single   Tobacco Use     Smoking status: Current Some Day Smoker     Packs/day: 0.50     Last attempt to quit: 2013     Years since quittin.7     Smokeless tobacco: Never Used   Substance and Sexual Activity     Alcohol use: Yes     Drug use: No     Sexual activity: Yes     Partners: Female     Birth control/protection: Pill   Other Topics Concern     Parent/sibling w/ CABG, MI or angioplasty before 65F 55M? No       Family History -   Family History   Problem Relation Age of Onset     Depression Sister      Depression Mother      Diabetes Mother         borderline     Hypertension Mother      Prostate Cancer Father 60     Lipids Father      Psychotic Disorder Maternal Uncle         suicide     Cancer - colorectal No family hx of        Review of Systems - As per HPI and PMHx, otherwise review of system review of the head and neck negative. Otherwise 10+ review of system is negative    Physical Exam  There were no vitals taken for this visit.  BMI: There is no height or weight on file to calculate BMI.    General - The patient is well nourished and well developed, and appears to have good nutritional status.  Alert and oriented to person and place, answers questions and cooperates with examination appropriately.    SKIN - No suspicious lesions or rashes.  Respiration - No respiratory distress.  Head and Face - Normocephalic and atraumatic, with no gross asymmetry noted of the contour of the facial features.  The facial nerve is intact, with strong symmetric movements.    Voice and Breathing - The patient was breathing  comfortably without the use of accessory muscles. The patients voice was clear and strong, and had appropriate pitch and quality.    Ears - Bilateral pinna and EACs with normal appearing overlying skin. Tympanic membrane intact with good mobility on pneumatic otoscopy bilaterally. Bony landmarks of the ossicular chain are normal. The tympanic membranes are normal in appearance. No retraction, perforation, or masses.  No fluid or purulence was seen in the external canal or the middle ear.     Eyes - Extraocular movements intact.  Sclera were not icteric or injected, conjunctiva were pink and moist.    Mouth - Examination of the oral cavity showed pink, healthy oral mucosa. No lesions or ulcerations noted.  The tongue was mobile and midline, and the dentition were in good condition.      Throat - The walls of the oropharynx were smooth, pink, moist, symmetric, and had no lesions or ulcerations.  The tonsillar pillars and soft palate were symmetric.  The uvula was midline on elevation.    Neck - Normal midline excursion of the laryngotracheal complex during swallowing.  Full range of motion on passive movement.  Palpation of the occipital, submental, submandibular, internal jugular chain, and supraclavicular nodes did not demonstrate any abnormal lymph nodes or masses.  The carotid pulse was palpable bilaterally.  Palpation of the thyroid was soft and smooth, with no nodules or goiter appreciated.  The trachea was mobile and midline.    Nose - External contour is symmetric, no gross deflection or scars.  Nasal mucosa is pink and moist with no abnormal mucus.  The septum was midline and non-obstructive, turbinates of normal size and position.  No polyps, masses, or purulence noted on examination.    Neuro - Nonfocal neuro exam is normal, CN 2 through 12 intact, normal gait and muscle tone.      Performed in clinic today:  Attempts at mirror laryngoscopy were not possible due to gag reflex.  Therefore I proceeded with a  fiberoptic examination.  First I sprayed both sides of the nose with a mixture of lidocaine and neosynephrine.  I then passed the scope through the nasal cavity.  The nasal cavity was unremarkable.  Also looking at each middle meatus bilaterally the fiberoptic scope right left side I see normal appearing middle turbinate-side as well as the lateral wall of the nose.  No polyps lesions noted.  The nasopharynx was mucosally covered and symmetric.  The Eustachian tube openings were unobstructed.  Going further down I had a clear view of the base of tongue which enlarged slightly erythematous but symmetrical appearing lingual tonsillar tissue.  The base of tongue was free of lesions, and the vallecula was open.  The epiglottis was smooth and mucosally covered.  The supraglottic larynx was then clearly visualized.  The vocal cords moved smoothly and symmetrically, they were pearly white and no lesions were seen.  The pyriform sinuses were open, and the limited view of the postcricoid region did not show any lesions.  Mild erythema seen of the arytenoid cartilages bilaterally.  [facility] - [id number] Optim ENTity      A/P - Roberto Randy Raton is a 42 year old male with what appears to be chronic sinusitis but rather unusual set of symptoms in regard to the pain involving mostly left side of the nose left maxillary area and the hypoesthesia that is temporary.  No significant more neurologic symptoms.  Yet he feels that as soon as he expels some secretions it immediately improves.  We will start him on the cefdinir for period of 3 more weeks since he already has done about a week of oral Cipro which we will stop.  Also will start fluticasone use at least once daily preferably than twice daily.  He can use saline in conjunction.  Collect repeat CT of the sinuses in particular to look at the anatomy and pathology of his sinuses.  Also counseled patient on the management of laryngopharyngeal reflux issues which may be  contributing to lingual tonsillitis enlarged lingual tonsil mild erythema of the posterior retinoids.  Certainly smoking can also enlarge lingual tonsil as well.  Smoking cessation was discussed.  He will work with his primary care doctor towards the goal.  Also we discussed starting on omeprazole 40 mg before supper as well as lifestyle changes that should include avoidance of late-night eating, avoidance of fatty greasy foods in the evening, avoidance of caffeinated products in the carbonated products in the evening and possibly torso elevation at night.  We will see the patient back in about 5 to 6 weeks    Vasiliy Ervin MD        Again, thank you for allowing me to participate in the care of your patient.        Sincerely,        Vasiliy Ervin MD, MD

## 2022-10-15 ENCOUNTER — HEALTH MAINTENANCE LETTER (OUTPATIENT)
Age: 42
End: 2022-10-15

## 2022-10-17 ENCOUNTER — TELEPHONE (OUTPATIENT)
Dept: INTERNAL MEDICINE | Facility: CLINIC | Age: 42
End: 2022-10-17

## 2022-10-17 NOTE — TELEPHONE ENCOUNTER
Prior Authorization Retail Medication Request    Medication/Dose: testosterone cypionate (DEPOTESTOSTERONE) 200 MG/ML injection  ICD code (if different than what is on RX):  Primary male hypogonadism [E29.1]   Previously Tried and Failed:  na  Rationale:  na    Insurance Name:  BOB  Insurance ID:  [id number]      Pharmacy Information (if different than what is on RX)  Name:  [facility]    Phone:  [phone]

## 2022-10-18 NOTE — TELEPHONE ENCOUNTER
Central Prior Authorization Team   Phone: [phone]      PA Initiation    Medication: testosterone cypionate (DEPOTESTOSTERONE) 200 MG/ML injection--INITIATED  Insurance Company: [employer] - Phone [phone] Fax [phone]  Pharmacy Filling the Rx: [facility] [place], [place] - [address]  Filling Pharmacy Phone: [phone]  Filling Pharmacy Fax:    Start Date: 10/18/2022

## 2022-10-19 NOTE — TELEPHONE ENCOUNTER
Central Prior Authorization Team   Phone: [phone]      Prior Authorization Approval    Authorization Effective Date: 9/18/2022  Authorization Expiration Date: 10/18/2023  Medication: testosterone cypionate (DEPOTESTOSTERONE) 200 MG/ML injection--APPROVED  Approved Dose/Quantity:    Reference #:     Insurance Company: [employer] - Phone [phone] Fax [phone]  Expected CoPay:       CoPay Card Available:      Foundation Assistance Needed:    Which Pharmacy is filling the prescription (Not needed for infusion/clinic administered): [facility] - [place], [place] - [address]  Pharmacy Notified: Yes  Patient Notified: Yes PHARMACY WILL CONTACT WHEN FILLED

## 2022-10-20 NOTE — TELEPHONE ENCOUNTER
Spouse called in looking to find out what medication was being sent for a Prior Auth. C2C on file to speak with spouse informed that medication was for Testosterone.     Closing encounter.   Justa Michael MA

## 2022-11-07 ENCOUNTER — MYC MEDICAL ADVICE (OUTPATIENT)
Dept: INTERNAL MEDICINE | Facility: CLINIC | Age: 42
End: 2022-11-07

## 2022-11-07 DIAGNOSIS — F40.10 SOCIAL PHOBIA: ICD-10-CM

## 2022-11-07 NOTE — TELEPHONE ENCOUNTER
Please see Next 1 [employer] message.    If ok for Ativan, prescription pended with note stating it is ok to fill now due to lost medication.

## 2022-11-08 RX ORDER — LORAZEPAM 0.5 MG/1
TABLET ORAL
Qty: 90 TABLET | Refills: 0 | Status: SHIPPED | OUTPATIENT
Start: 2022-11-08 | End: 2023-06-07

## 2022-11-29 DIAGNOSIS — J30.2 SEASONAL ALLERGIC RHINITIS, UNSPECIFIED TRIGGER: ICD-10-CM

## 2022-11-29 DIAGNOSIS — F40.10 SOCIAL PHOBIA: ICD-10-CM

## 2022-11-29 DIAGNOSIS — R09.A2 GLOBUS SENSATION: Primary | ICD-10-CM

## 2022-11-29 DIAGNOSIS — G47.9 SLEEP DISORDER: ICD-10-CM

## 2022-11-29 RX ORDER — OMEPRAZOLE 40 MG/1
40 CAPSULE, DELAYED RELEASE ORAL DAILY
Qty: 90 CAPSULE | Refills: 1 | Status: SHIPPED | OUTPATIENT
Start: 2022-11-29 | End: 2023-06-06

## 2022-11-29 RX ORDER — FLUTICASONE PROPIONATE 50 MCG
2 SPRAY, SUSPENSION (ML) NASAL DAILY
Qty: 16 G | Refills: 3 | Status: SHIPPED | OUTPATIENT
Start: 2022-11-29 | End: 2023-07-03

## 2022-11-29 NOTE — TELEPHONE ENCOUNTER
Patient was prescribed Omeprazole and Flonase by Dr. Ervin in [facility] on 10/6/22. Prescriptions have since . Patient should follow-up with PCP for refills.    Lakesha Hines RN on 2022 at 9:44 AM

## 2022-12-02 RX ORDER — TRAZODONE HYDROCHLORIDE 100 MG/1
TABLET ORAL
Qty: 135 TABLET | Refills: 0 | Status: SHIPPED | OUTPATIENT
Start: 2022-12-02 | End: 2023-02-19

## 2022-12-02 NOTE — TELEPHONE ENCOUNTER
Pending Prescriptions:                       Disp   Refills    traZODone (DESYREL) 100 MG tablet [Pharmac*135 ta*3        Sig: Take one and one-half tablets (150 mg) by mouth at           bedtime for sleep      Routing refill request to provider for review/approval because:  Patient needs to be seen because it has been more than 1 year since last office visit.    Alexia Caicedo RN

## 2022-12-26 ENCOUNTER — MYC REFILL (OUTPATIENT)
Dept: FAMILY MEDICINE | Facility: CLINIC | Age: 42
End: 2022-12-26

## 2022-12-26 DIAGNOSIS — F98.8 ATTENTION DEFICIT DISORDER, UNSPECIFIED HYPERACTIVITY PRESENCE: ICD-10-CM

## 2022-12-26 RX ORDER — DEXTROAMPHETAMINE SACCHARATE, AMPHETAMINE ASPARTATE MONOHYDRATE, DEXTROAMPHETAMINE SULFATE AND AMPHETAMINE SULFATE 5; 5; 5; 5 MG/1; MG/1; MG/1; MG/1
20 CAPSULE, EXTENDED RELEASE ORAL DAILY
Qty: 30 CAPSULE | Refills: 0 | Status: SHIPPED | OUTPATIENT
Start: 2022-12-26 | End: 2023-02-19

## 2022-12-26 NOTE — TELEPHONE ENCOUNTER
Requested Prescriptions   Pending Prescriptions Disp Refills     amphetamine-dextroamphetamine (ADDERALL XR) 20 MG 24 hr capsule 90 capsule 0     Sig: Take 1 capsule (20 mg) by mouth daily       There is no refill protocol information for this order          Routing refill request to provider for review/approval because:  Drug not on the [facility], [facility] or [facility] refill protocol or controlled substance

## 2023-01-21 DIAGNOSIS — B00.1 COLD SORE: ICD-10-CM

## 2023-01-24 RX ORDER — VALACYCLOVIR HYDROCHLORIDE 1 G/1
TABLET, FILM COATED ORAL
Qty: 28 TABLET | Refills: 2 | Status: SHIPPED | OUTPATIENT
Start: 2023-01-24 | End: 2024-04-03

## 2023-01-30 DIAGNOSIS — I10 ESSENTIAL HYPERTENSION: ICD-10-CM

## 2023-01-31 NOTE — TELEPHONE ENCOUNTER
"Requested Prescriptions   Pending Prescriptions Disp Refills    amLODIPine (NORVASC) 10 MG tablet [Pharmacy Med Name: amLODIPine 10 mg tablet (NORVASC)] 90 tablet 2     Sig: Take 1 tablet (10 mg) by mouth daily       Calcium Channel Blockers Protocol  Failed - 1/30/2023  9:15 PM        Failed - Recent (12 mo) or future (30 days) visit within the authorizing provider's specialty     Patient has had an office visit with the authorizing provider or a provider within the authorizing providers department within the previous 12 mos or has a future within next 30 days. See \"Patient Info\" tab in inbasket, or \"Choose Columns\" in Meds & Orders section of the refill encounter.              Failed - Normal serum creatinine on file in past 12 months     Recent Labs   Lab Test 03/10/21  1615   CR 0.74       Ok to refill medication if creatinine is low          Passed - Blood pressure under 140/90 in past 12 months     BP Readings from Last 3 Encounters:   10/06/22 124/84   04/07/21 132/80   04/04/21 (!) 148/105                 Passed - Medication is active on med list        Passed - Patient is age 18 or older              Alexia Griffith RN  "

## 2023-02-01 RX ORDER — AMLODIPINE BESYLATE 10 MG/1
10 TABLET ORAL DAILY
Qty: 90 TABLET | Refills: 2 | Status: SHIPPED | OUTPATIENT
Start: 2023-02-01 | End: 2023-11-06

## 2023-02-19 ENCOUNTER — MYC REFILL (OUTPATIENT)
Dept: FAMILY MEDICINE | Facility: CLINIC | Age: 43
End: 2023-02-19
Payer: COMMERCIAL

## 2023-02-19 ENCOUNTER — MYC REFILL (OUTPATIENT)
Dept: INTERNAL MEDICINE | Facility: CLINIC | Age: 43
End: 2023-02-19
Payer: COMMERCIAL

## 2023-02-19 DIAGNOSIS — G47.9 SLEEP DISORDER: ICD-10-CM

## 2023-02-19 DIAGNOSIS — F98.8 ATTENTION DEFICIT DISORDER, UNSPECIFIED HYPERACTIVITY PRESENCE: ICD-10-CM

## 2023-02-19 DIAGNOSIS — F40.10 SOCIAL PHOBIA: ICD-10-CM

## 2023-02-20 RX ORDER — DEXTROAMPHETAMINE SACCHARATE, AMPHETAMINE ASPARTATE MONOHYDRATE, DEXTROAMPHETAMINE SULFATE AND AMPHETAMINE SULFATE 5; 5; 5; 5 MG/1; MG/1; MG/1; MG/1
20 CAPSULE, EXTENDED RELEASE ORAL DAILY
Qty: 30 CAPSULE | Refills: 0 | Status: SHIPPED | OUTPATIENT
Start: 2023-02-20 | End: 2023-03-01

## 2023-02-20 NOTE — TELEPHONE ENCOUNTER
Requested Prescriptions   Pending Prescriptions Disp Refills     amphetamine-dextroamphetamine (ADDERALL XR) 20 MG 24 hr capsule 30 capsule 0     Sig: Take 1 capsule (20 mg) by mouth daily       There is no refill protocol information for this order        Routing refill request to provider for review/approval because:  Drug not on the [facility], [facility] or [facility] refill protocol or controlled substance

## 2023-02-21 RX ORDER — TRAZODONE HYDROCHLORIDE 100 MG/1
150 TABLET ORAL AT BEDTIME
Qty: 135 TABLET | Refills: 0 | Status: SHIPPED | OUTPATIENT
Start: 2023-02-21 | End: 2023-05-15

## 2023-02-21 NOTE — TELEPHONE ENCOUNTER
Pending Prescriptions:                       Disp   Refills    traZODone (DESYREL) 100 MG tablet          135 ta*0            Routing refill request to provider for review/approval because:  Patient needs to be seen because it has been more than 1 year since last office visit.    Megha Browning RN on 2/21/2023 at 11:27 AM

## 2023-03-01 ENCOUNTER — MYC REFILL (OUTPATIENT)
Dept: FAMILY MEDICINE | Facility: CLINIC | Age: 43
End: 2023-03-01
Payer: COMMERCIAL

## 2023-03-01 DIAGNOSIS — F98.8 ATTENTION DEFICIT DISORDER, UNSPECIFIED HYPERACTIVITY PRESENCE: ICD-10-CM

## 2023-03-01 RX ORDER — DEXTROAMPHETAMINE SACCHARATE, AMPHETAMINE ASPARTATE MONOHYDRATE, DEXTROAMPHETAMINE SULFATE AND AMPHETAMINE SULFATE 5; 5; 5; 5 MG/1; MG/1; MG/1; MG/1
20 CAPSULE, EXTENDED RELEASE ORAL DAILY
Qty: 30 CAPSULE | Refills: 0 | Status: SHIPPED | OUTPATIENT
Start: 2023-03-01 | End: 2023-05-19

## 2023-03-01 NOTE — TELEPHONE ENCOUNTER
Pending Prescriptions:                       Disp   Refills    amphetamine-dextroamphetamine (ADDERALL XR*30 cap*0        Sig: Take 1 capsule (20 mg) by mouth daily      Routing refill request to provider for review/approval because:  Requesting refill sent to [facility] due to stock issues at current pharmacy    Alexia Caicedo RN

## 2023-03-09 ENCOUNTER — TELEPHONE (OUTPATIENT)
Dept: INTERNAL MEDICINE | Facility: CLINIC | Age: 43
End: 2023-03-09
Payer: COMMERCIAL

## 2023-03-09 NOTE — TELEPHONE ENCOUNTER
Prior Authorization Retail Medication Request    Medication/Dose: amphetamine-dextroamphetamine (ADDERALL XR) 20 MG 24 hr capsule  ICD code (if different than what is on RX):  Attention deficit disorder, unspecified hyperactivity presence [F98.8]   Previously Tried and Failed:  na  Rationale:  na    Insurance Name:  [employer] [place]  Insurance ID:  [id number]      Pharmacy Information (if different than what is on RX)  [facility]   Phone:  [phone]

## 2023-03-14 DIAGNOSIS — N52.2 DRUG-INDUCED ERECTILE DYSFUNCTION: ICD-10-CM

## 2023-03-14 RX ORDER — SILDENAFIL 100 MG/1
50-100 TABLET, FILM COATED ORAL DAILY PRN
Qty: 10 TABLET | Refills: 11 | Status: SHIPPED | OUTPATIENT
Start: 2023-03-14 | End: 2024-03-20

## 2023-03-14 NOTE — TELEPHONE ENCOUNTER
Patients wife called to request medication be sent to Dr. Duarte to sign for Sildenafil. C2C on file.     Justa Michael MA

## 2023-03-14 NOTE — TELEPHONE ENCOUNTER
PRIOR AUTHORIZATION DENIED    Medication: amphetamine-dextroamphetamine (ADDERALL XR) 20 MG 24 hr capsule-DENIED    Denial Date: 3/14/2023    Called pharmacy to give them the NDCs per tech a PA is not needed, they have the generic in stock and filled.    Denial Rational: Insurance informed of generic shortage.        Appeal Information:

## 2023-03-14 NOTE — TELEPHONE ENCOUNTER
Central Prior Authorization Team   Phone: [phone]      PA Initiation    Medication: amphetamine-dextroamphetamine (ADDERALL XR) 20 MG 24 hr capsule  Insurance Company: [employer] - Phone [phone] Fax [phone]  Pharmacy Filling the Rx: [facility] [address] [place], [place] - [address]  Filling Pharmacy Phone: [phone]  Filling Pharmacy Fax:    Start Date: 3/14/2023

## 2023-03-28 DIAGNOSIS — F40.10 SOCIAL PHOBIA: ICD-10-CM

## 2023-03-28 DIAGNOSIS — F33.0 MILD RECURRENT MAJOR DEPRESSION (H): ICD-10-CM

## 2023-03-28 DIAGNOSIS — I10 ESSENTIAL HYPERTENSION: ICD-10-CM

## 2023-03-29 RX ORDER — VENLAFAXINE HYDROCHLORIDE 75 MG/1
225 CAPSULE, EXTENDED RELEASE ORAL DAILY
Qty: 270 CAPSULE | Refills: 3 | Status: SHIPPED | OUTPATIENT
Start: 2023-03-29 | End: 2024-04-03

## 2023-03-29 RX ORDER — LOSARTAN POTASSIUM AND HYDROCHLOROTHIAZIDE 12.5; 5 MG/1; MG/1
1 TABLET ORAL DAILY
Qty: 90 TABLET | Refills: 1 | Status: SHIPPED | OUTPATIENT
Start: 2023-03-29 | End: 2023-10-05

## 2023-03-29 NOTE — TELEPHONE ENCOUNTER
Pending Prescriptions:                       Disp   Refills    venlafaxine (EFFEXOR XR) 75 MG 24 hr capsu*270 ca*3        Sig: Take 3 capsules (225 mg) by mouth daily    losartan-hydrochlorothiazide (HYZAAR) 50-1*90 tab*1        Sig: Take 1 tablet by mouth daily      Routing refill request to provider for review/approval because:  Patient needs to be seen because it has been more than 1 year since last office visit.    Megha Browning RN on 3/29/2023 at 3:31 PM

## 2023-05-02 NOTE — PROGRESS NOTES
I have sent a prescription for Augmentin to the patient's pharmacy in [place].  If he has any redness, or increased swelling at the bite site, he should set up an in person visit immediately.    Felicia   Reason for call:  Medication     If this is a refill request, has the caller requested the refill from the pharmacy already? No     Will the patient be using a [facility]? No     Name of the pharmacy and phone number for the current request: [facility] [phone]    Name of the medication requested: VALACYCLOVIR    Other request: PLEASE GIVE REFILLS    aNNUAL DONE IN MARCH    Phone number to reach patient:  [phone]    Best Time:  any    Can we leave a detailed message on this number?  YES    Travel screening: Not Applicable

## 2023-05-11 DIAGNOSIS — G47.9 SLEEP DISORDER: ICD-10-CM

## 2023-05-11 DIAGNOSIS — F40.10 SOCIAL PHOBIA: ICD-10-CM

## 2023-05-12 NOTE — TELEPHONE ENCOUNTER
"Routing refill request to provider for review/approval because:  Last office visit 12/2021  Requested Prescriptions   Pending Prescriptions Disp Refills    traZODone (DESYREL) 100 MG tablet [Pharmacy Med Name: traZODone 100 mg tablet (DESYREL)] 135 tablet 0     Sig: Take 1 and one-half tablets (150 mg) by mouth At Bedtime       Serotonin Modulators Failed - 5/11/2023  8:00 PM        Failed - Recent (12 mo) or future (30 days) visit within the authorizing provider's specialty     Patient has had an office visit with the authorizing provider or a provider within the authorizing providers department within the previous 12 mos or has a future within next 30 days. See \"Patient Info\" tab in inbasket, or \"Choose Columns\" in Meds & Orders section of the refill encounter.              Passed - Medication is active on med list        Passed - Patient is age 18 or older                   "

## 2023-05-15 RX ORDER — TRAZODONE HYDROCHLORIDE 100 MG/1
TABLET ORAL
Qty: 135 TABLET | Refills: 0 | Status: SHIPPED | OUTPATIENT
Start: 2023-05-15 | End: 2023-07-26

## 2023-05-16 ENCOUNTER — MYC REFILL (OUTPATIENT)
Dept: INTERNAL MEDICINE | Facility: CLINIC | Age: 43
End: 2023-05-16
Payer: COMMERCIAL

## 2023-05-16 DIAGNOSIS — E29.1 PRIMARY MALE HYPOGONADISM: ICD-10-CM

## 2023-05-16 RX ORDER — TESTOSTERONE CYPIONATE 200 MG/ML
INJECTION, SOLUTION INTRAMUSCULAR
Qty: 3 ML | Refills: 0 | Status: SHIPPED | OUTPATIENT
Start: 2023-05-16 | End: 2023-06-07

## 2023-05-19 ENCOUNTER — MYC REFILL (OUTPATIENT)
Dept: FAMILY MEDICINE | Facility: CLINIC | Age: 43
End: 2023-05-19
Payer: COMMERCIAL

## 2023-05-19 DIAGNOSIS — F98.8 ATTENTION DEFICIT DISORDER, UNSPECIFIED HYPERACTIVITY PRESENCE: ICD-10-CM

## 2023-05-19 NOTE — TELEPHONE ENCOUNTER
Routing refill request to provider for review/approval because:    Requested Prescriptions   Pending Prescriptions Disp Refills    amphetamine-dextroamphetamine (ADDERALL XR) 20 MG 24 hr capsule 30 capsule 0     Sig: Take 1 capsule (20 mg) by mouth daily       There is no refill protocol information for this order

## 2023-05-23 RX ORDER — DEXTROAMPHETAMINE SACCHARATE, AMPHETAMINE ASPARTATE MONOHYDRATE, DEXTROAMPHETAMINE SULFATE AND AMPHETAMINE SULFATE 5; 5; 5; 5 MG/1; MG/1; MG/1; MG/1
20 CAPSULE, EXTENDED RELEASE ORAL DAILY
Qty: 30 CAPSULE | Refills: 0 | Status: SHIPPED | OUTPATIENT
Start: 2023-05-23 | End: 2023-10-05

## 2023-06-01 ENCOUNTER — HEALTH MAINTENANCE LETTER (OUTPATIENT)
Age: 43
End: 2023-06-01

## 2023-06-05 DIAGNOSIS — R09.A2 GLOBUS SENSATION: ICD-10-CM

## 2023-06-06 RX ORDER — OMEPRAZOLE 40 MG/1
40 CAPSULE, DELAYED RELEASE ORAL DAILY
Qty: 90 CAPSULE | Refills: 1 | Status: SHIPPED | OUTPATIENT
Start: 2023-06-06 | End: 2023-11-30

## 2023-06-07 ENCOUNTER — MYC REFILL (OUTPATIENT)
Dept: INTERNAL MEDICINE | Facility: CLINIC | Age: 43
End: 2023-06-07
Payer: COMMERCIAL

## 2023-06-07 DIAGNOSIS — E29.1 PRIMARY MALE HYPOGONADISM: ICD-10-CM

## 2023-06-07 DIAGNOSIS — F40.10 SOCIAL PHOBIA: ICD-10-CM

## 2023-06-07 RX ORDER — TESTOSTERONE CYPIONATE 200 MG/ML
INJECTION, SOLUTION INTRAMUSCULAR
Qty: 1 ML | Refills: 0 | Status: SHIPPED | OUTPATIENT
Start: 2023-06-07 | End: 2023-08-28

## 2023-06-08 DIAGNOSIS — R09.A2 GLOBUS SENSATION: ICD-10-CM

## 2023-06-08 RX ORDER — LORAZEPAM 0.5 MG/1
TABLET ORAL
Qty: 90 TABLET | Refills: 0 | Status: SHIPPED | OUTPATIENT
Start: 2023-06-08 | End: 2023-10-05

## 2023-06-08 RX ORDER — OMEPRAZOLE 40 MG/1
CAPSULE, DELAYED RELEASE ORAL
Qty: 90 CAPSULE | Refills: 0 | OUTPATIENT
Start: 2023-06-08

## 2023-06-08 NOTE — TELEPHONE ENCOUNTER
Requested Prescriptions   Pending Prescriptions Disp Refills     LORazepam (ATIVAN) 0.5 MG tablet 90 tablet 0     Sig: Take ONE to TWO tablets by mouth EVERY 6 HOURS, AS NEEDED FOR ANXIETY.         Routing refill request to provider for review/approval because:  Drug not on the [facility], [facility] or [facility] refill protocol or controlled substance

## 2023-06-27 ENCOUNTER — E-VISIT (OUTPATIENT)
Dept: INTERNAL MEDICINE | Facility: CLINIC | Age: 43
End: 2023-06-27
Payer: COMMERCIAL

## 2023-06-27 DIAGNOSIS — H10.33 ACUTE BACTERIAL CONJUNCTIVITIS OF BOTH EYES: Primary | ICD-10-CM

## 2023-06-27 PROCEDURE — 99421 OL DIG E/M SVC 5-10 MIN: CPT | Performed by: INTERNAL MEDICINE

## 2023-06-27 RX ORDER — NEOMYCIN POLYMYXIN B SULFATES AND DEXAMETHASONE 3.5; 10000; 1 MG/ML; [USP'U]/ML; MG/ML
2 SUSPENSION/ DROPS OPHTHALMIC 4 TIMES DAILY
Qty: 5 ML | Refills: 0 | Status: SHIPPED | OUTPATIENT
Start: 2023-06-27 | End: 2023-11-10

## 2023-07-03 DIAGNOSIS — J30.2 SEASONAL ALLERGIC RHINITIS, UNSPECIFIED TRIGGER: ICD-10-CM

## 2023-07-03 RX ORDER — FLUTICASONE PROPIONATE 50 MCG
SPRAY, SUSPENSION (ML) NASAL
Qty: 16 G | Refills: 5 | Status: SHIPPED | OUTPATIENT
Start: 2023-07-03 | End: 2024-05-22

## 2023-07-26 ENCOUNTER — MYC REFILL (OUTPATIENT)
Dept: INTERNAL MEDICINE | Facility: CLINIC | Age: 43
End: 2023-07-26
Payer: COMMERCIAL

## 2023-07-26 DIAGNOSIS — G47.9 SLEEP DISORDER: ICD-10-CM

## 2023-07-26 DIAGNOSIS — F40.10 SOCIAL PHOBIA: ICD-10-CM

## 2023-07-26 DIAGNOSIS — F33.0 MILD RECURRENT MAJOR DEPRESSION (H): ICD-10-CM

## 2023-07-27 RX ORDER — VENLAFAXINE 37.5 MG/1
TABLET ORAL
Qty: 60 TABLET | Refills: 0 | Status: SHIPPED | OUTPATIENT
Start: 2023-07-27

## 2023-07-27 RX ORDER — TRAZODONE HYDROCHLORIDE 100 MG/1
150 TABLET ORAL AT BEDTIME
Qty: 135 TABLET | Refills: 0 | Status: SHIPPED | OUTPATIENT
Start: 2023-07-27 | End: 2023-10-16

## 2023-08-28 ENCOUNTER — MYC REFILL (OUTPATIENT)
Dept: INTERNAL MEDICINE | Facility: CLINIC | Age: 43
End: 2023-08-28
Payer: COMMERCIAL

## 2023-08-28 DIAGNOSIS — E29.1 PRIMARY MALE HYPOGONADISM: ICD-10-CM

## 2023-08-29 RX ORDER — TESTOSTERONE CYPIONATE 200 MG/ML
INJECTION, SOLUTION INTRAMUSCULAR
Qty: 1 ML | Refills: 0 | Status: SHIPPED | OUTPATIENT
Start: 2023-08-29 | End: 2023-09-27

## 2023-09-05 ENCOUNTER — TELEPHONE (OUTPATIENT)
Dept: INTERNAL MEDICINE | Facility: CLINIC | Age: 43
End: 2023-09-05
Payer: COMMERCIAL

## 2023-09-05 NOTE — TELEPHONE ENCOUNTER
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

## 2023-09-07 NOTE — TELEPHONE ENCOUNTER
Central Prior Authorization Team  Phone: [phone]    PA Initiation    Medication: SILDENAFIL CITRATE 100 MG PO TABS  Insurance Company: [employer] Non-Specialty PA's - Phone [phone] Fax [phone]  Pharmacy Filling the Rx: [facility] - [place], [place] - [address] [place] [address]  Filling Pharmacy Phone: [phone]  Filling Pharmacy Fax:    Start Date: 9/7/2023

## 2023-09-07 NOTE — TELEPHONE ENCOUNTER
Prior Authorization Approval    Medication: SILDENAFIL CITRATE 100 MG PO TABS  Authorization Effective Date: 8/8/2023  Authorization Expiration Date: 9/6/2024  Approved Dose/Quantity:   Reference #:     Insurance Company: [employer] Non-Specialty PA's - Phone [phone] Fax [phone]  Expected CoPay:       CoPay Card Available:      Financial Assistance Needed:   Which Pharmacy is filling the prescription: [facility] - [place], [place] - [address] [place] [address]  Pharmacy Notified: Yes  Patient Notified:  **Instructed pharmacy to notify patient when script is ready to /ship.**

## 2023-10-05 ENCOUNTER — MYC REFILL (OUTPATIENT)
Dept: FAMILY MEDICINE | Facility: CLINIC | Age: 43
End: 2023-10-05
Payer: COMMERCIAL

## 2023-10-05 DIAGNOSIS — F98.8 ATTENTION DEFICIT DISORDER, UNSPECIFIED HYPERACTIVITY PRESENCE: ICD-10-CM

## 2023-10-05 DIAGNOSIS — F40.10 SOCIAL PHOBIA: ICD-10-CM

## 2023-10-05 DIAGNOSIS — I10 ESSENTIAL HYPERTENSION: ICD-10-CM

## 2023-10-05 RX ORDER — LORAZEPAM 0.5 MG/1
TABLET ORAL
Qty: 20 TABLET | Refills: 0 | Status: SHIPPED | OUTPATIENT
Start: 2023-10-05 | End: 2024-01-22

## 2023-10-05 RX ORDER — DEXTROAMPHETAMINE SACCHARATE, AMPHETAMINE ASPARTATE MONOHYDRATE, DEXTROAMPHETAMINE SULFATE AND AMPHETAMINE SULFATE 5; 5; 5; 5 MG/1; MG/1; MG/1; MG/1
20 CAPSULE, EXTENDED RELEASE ORAL DAILY
Qty: 30 CAPSULE | Refills: 0 | Status: SHIPPED | OUTPATIENT
Start: 2023-10-05 | End: 2024-01-03

## 2023-10-05 RX ORDER — LOSARTAN POTASSIUM AND HYDROCHLOROTHIAZIDE 12.5; 5 MG/1; MG/1
1 TABLET ORAL DAILY
Qty: 90 TABLET | Refills: 1 | Status: SHIPPED | OUTPATIENT
Start: 2023-10-05 | End: 2024-04-03

## 2023-10-05 NOTE — TELEPHONE ENCOUNTER
Requested Prescriptions   Pending Prescriptions Disp Refills    amphetamine-dextroamphetamine (ADDERALL XR) 20 MG 24 hr capsule 30 capsule 0     Sig: Take 1 capsule (20 mg) by mouth daily         Routing refill request to provider for review/approval because:  Drug not on the [facility], [facility] or [facility] refill protocol or controlled substance             LORazepam (ATIVAN) 0.5 MG tablet 90 tablet 0     Sig: Take ONE to TWO tablets by mouth EVERY 6 HOURS, AS NEEDED FOR ANXIETY.         Routing refill request to provider for review/approval because:  Drug not on the [facility], [facility] or [facility] refill protocol or controlled substance

## 2023-10-16 ENCOUNTER — MYC REFILL (OUTPATIENT)
Dept: INTERNAL MEDICINE | Facility: CLINIC | Age: 43
End: 2023-10-16
Payer: COMMERCIAL

## 2023-10-16 DIAGNOSIS — F40.10 SOCIAL PHOBIA: ICD-10-CM

## 2023-10-16 DIAGNOSIS — G47.9 SLEEP DISORDER: ICD-10-CM

## 2023-10-16 RX ORDER — TRAZODONE HYDROCHLORIDE 100 MG/1
150 TABLET ORAL AT BEDTIME
Qty: 135 TABLET | Refills: 1 | Status: SHIPPED | OUTPATIENT
Start: 2023-10-16 | End: 2023-12-08

## 2023-10-27 ENCOUNTER — MYC REFILL (OUTPATIENT)
Dept: INTERNAL MEDICINE | Facility: CLINIC | Age: 43
End: 2023-10-27
Payer: COMMERCIAL

## 2023-10-27 DIAGNOSIS — E29.1 PRIMARY MALE HYPOGONADISM: ICD-10-CM

## 2023-10-27 RX ORDER — TESTOSTERONE CYPIONATE 200 MG/ML
INJECTION, SOLUTION INTRAMUSCULAR
Qty: 1 ML | Refills: 0 | Status: SHIPPED | OUTPATIENT
Start: 2023-10-27 | End: 2023-11-30

## 2023-10-28 ENCOUNTER — LAB (OUTPATIENT)
Dept: LAB | Facility: CLINIC | Age: 43
End: 2023-10-28
Payer: COMMERCIAL

## 2023-10-28 DIAGNOSIS — E29.1 PRIMARY MALE HYPOGONADISM: ICD-10-CM

## 2023-10-28 DIAGNOSIS — Z12.5 SCREENING FOR PROSTATE CANCER: ICD-10-CM

## 2023-10-28 LAB
ALBUMIN SERPL BCG-MCNC: 4.7 G/DL (ref 3.5–5.2)
ALP SERPL-CCNC: 57 U/L (ref 40–129)
ALT SERPL W P-5'-P-CCNC: 37 U/L (ref 0–70)
ANION GAP SERPL CALCULATED.3IONS-SCNC: 15 MMOL/L (ref 7–15)
AST SERPL W P-5'-P-CCNC: 21 U/L (ref 0–45)
BILIRUB SERPL-MCNC: 0.6 MG/DL
BUN SERPL-MCNC: 12.4 MG/DL (ref 6–20)
CALCIUM SERPL-MCNC: 9.2 MG/DL (ref 8.6–10)
CHLORIDE SERPL-SCNC: 103 MMOL/L (ref 98–107)
CREAT SERPL-MCNC: 0.81 MG/DL (ref 0.67–1.17)
DEPRECATED HCO3 PLAS-SCNC: 21 MMOL/L (ref 22–29)
EGFRCR SERPLBLD CKD-EPI 2021: >90 ML/MIN/1.73M2
ERYTHROCYTE [DISTWIDTH] IN BLOOD BY AUTOMATED COUNT: 12.8 % (ref 10–15)
GLUCOSE SERPL-MCNC: 91 MG/DL (ref 70–99)
HCT VFR BLD AUTO: 45 % (ref 40–53)
HGB BLD-MCNC: 15.3 G/DL (ref 13.3–17.7)
MCH RBC QN AUTO: 30.2 PG (ref 26.5–33)
MCHC RBC AUTO-ENTMCNC: 34 G/DL (ref 31.5–36.5)
MCV RBC AUTO: 89 FL (ref 78–100)
PLATELET # BLD AUTO: 284 10E3/UL (ref 150–450)
POTASSIUM SERPL-SCNC: 4.4 MMOL/L (ref 3.4–5.3)
PROT SERPL-MCNC: 7.1 G/DL (ref 6.4–8.3)
PSA SERPL DL<=0.01 NG/ML-MCNC: 0.71 NG/ML (ref 0–2.5)
RBC # BLD AUTO: 5.06 10E6/UL (ref 4.4–5.9)
SODIUM SERPL-SCNC: 139 MMOL/L (ref 135–145)
WBC # BLD AUTO: 6.4 10E3/UL (ref 4–11)

## 2023-10-28 PROCEDURE — G0103 PSA SCREENING: HCPCS

## 2023-10-28 PROCEDURE — 80053 COMPREHEN METABOLIC PANEL: CPT

## 2023-10-28 PROCEDURE — 85027 COMPLETE CBC AUTOMATED: CPT

## 2023-10-28 PROCEDURE — 36415 COLL VENOUS BLD VENIPUNCTURE: CPT

## 2023-10-29 LAB
CHOLEST SERPL-MCNC: 194 MG/DL
HDLC SERPL-MCNC: 47 MG/DL
LDLC SERPL CALC-MCNC: 106 MG/DL
NONHDLC SERPL-MCNC: 147 MG/DL
TRIGL SERPL-MCNC: 203 MG/DL

## 2023-10-29 PROCEDURE — 84403 ASSAY OF TOTAL TESTOSTERONE: CPT

## 2023-10-29 PROCEDURE — 80061 LIPID PANEL: CPT

## 2023-10-31 ENCOUNTER — TELEPHONE (OUTPATIENT)
Dept: INTERNAL MEDICINE | Facility: CLINIC | Age: 43
End: 2023-10-31
Payer: COMMERCIAL

## 2023-10-31 LAB — TESTOST SERPL-MCNC: 406 NG/DL (ref 240–950)

## 2023-10-31 NOTE — TELEPHONE ENCOUNTER
Prior Authorization Retail Medication Request    Medication/Dose: testosterone cypionate (DEPOTESTOSTERONE) 200 MG/ML injection  ICD code (if different than what is on RX):  Primary male hypogonadism [E29.1]   Previously Tried and Failed:  na  Rationale:  na    Insurance Name:  [facility]   Insurance ID:  [id number]       Pharmacy Information (if different than what is on RX)  [facility]  Phone:  [phone]

## 2023-11-01 ENCOUNTER — LAB (OUTPATIENT)
Dept: LAB | Facility: CLINIC | Age: 43
End: 2023-11-01
Payer: COMMERCIAL

## 2023-11-01 DIAGNOSIS — E29.1 PRIMARY MALE HYPOGONADISM: ICD-10-CM

## 2023-11-01 PROCEDURE — 36415 COLL VENOUS BLD VENIPUNCTURE: CPT

## 2023-11-01 PROCEDURE — 84403 ASSAY OF TOTAL TESTOSTERONE: CPT

## 2023-11-02 NOTE — TELEPHONE ENCOUNTER
Central Prior Authorization Team   Phone: [phone]    PA Initiation    Medication: testosterone cypionate (DEPOTESTOSTERONE) 200 MG/ML injection  Insurance Company: [employer] Non-Specialty PA's - Phone [phone] Fax [phone]  Pharmacy Filling the Rx: [facility] [address] [place] [address] - [address]  Filling Pharmacy Phone: [phone]  Filling Pharmacy Fax: [phone]  Start Date: 11/2/2023

## 2023-11-02 NOTE — TELEPHONE ENCOUNTER
Prior Authorization Approval    Authorization Effective Date: 10/3/2023  Authorization Expiration Date: 11/1/2024  Medication: testosterone cypionate (DEPOTESTOSTERONE) 200 MG/ML injection - APPROVED  Approved Dose/Quantity:    Reference #:     Insurance Company: [employer] Non-Specialty PA's - Phone [phone] Fax [phone]  Expected CoPay:       CoPay Card Available:      Foundation Assistance Needed:    Which Pharmacy is filling the prescription (Not needed for infusion/clinic administered): [facility] [address] [place], [place] - [address]  Pharmacy Notified:  YES  Patient Notified:  YES  **Instructed pharmacy to notify patient when script is ready to /ship.**

## 2023-11-03 LAB — TESTOST SERPL-MCNC: 1280 NG/DL (ref 240–950)

## 2023-11-06 DIAGNOSIS — I10 ESSENTIAL HYPERTENSION: ICD-10-CM

## 2023-11-06 RX ORDER — AMLODIPINE BESYLATE 10 MG/1
10 TABLET ORAL DAILY
Qty: 90 TABLET | Refills: 2 | Status: SHIPPED | OUTPATIENT
Start: 2023-11-06 | End: 2024-08-05

## 2023-11-10 ENCOUNTER — OFFICE VISIT (OUTPATIENT)
Dept: INTERNAL MEDICINE | Facility: CLINIC | Age: 43
End: 2023-11-10
Payer: COMMERCIAL

## 2023-11-10 VITALS
TEMPERATURE: 98.3 F | WEIGHT: 233 LBS | DIASTOLIC BLOOD PRESSURE: 82 MMHG | SYSTOLIC BLOOD PRESSURE: 138 MMHG | HEART RATE: 100 BPM | HEIGHT: 75 IN | RESPIRATION RATE: 16 BRPM | OXYGEN SATURATION: 97 % | BODY MASS INDEX: 28.97 KG/M2

## 2023-11-10 DIAGNOSIS — G47.9 SLEEP DISORDER: ICD-10-CM

## 2023-11-10 DIAGNOSIS — Z00.00 ROUTINE GENERAL MEDICAL EXAMINATION AT A HEALTH CARE FACILITY: Primary | ICD-10-CM

## 2023-11-10 DIAGNOSIS — F40.10 SOCIAL PHOBIA: ICD-10-CM

## 2023-11-10 DIAGNOSIS — E29.1 PRIMARY HYPOGONADISM IN MALE: ICD-10-CM

## 2023-11-10 DIAGNOSIS — I10 ESSENTIAL HYPERTENSION: ICD-10-CM

## 2023-11-10 DIAGNOSIS — Z11.59 NEED FOR HEPATITIS C SCREENING TEST: ICD-10-CM

## 2023-11-10 DIAGNOSIS — F33.0 MILD RECURRENT MAJOR DEPRESSION (H): ICD-10-CM

## 2023-11-10 PROCEDURE — 91320 SARSCV2 VAC 30MCG TRS-SUC IM: CPT | Performed by: INTERNAL MEDICINE

## 2023-11-10 PROCEDURE — 90480 ADMN SARSCOV2 VAC 1/ONLY CMP: CPT | Performed by: INTERNAL MEDICINE

## 2023-11-10 PROCEDURE — 90686 IIV4 VACC NO PRSV 0.5 ML IM: CPT | Performed by: INTERNAL MEDICINE

## 2023-11-10 PROCEDURE — 99396 PREV VISIT EST AGE 40-64: CPT | Mod: 25 | Performed by: INTERNAL MEDICINE

## 2023-11-10 PROCEDURE — 90471 IMMUNIZATION ADMIN: CPT | Performed by: INTERNAL MEDICINE

## 2023-11-10 ASSESSMENT — ENCOUNTER SYMPTOMS
PALPITATIONS: 0
NERVOUS/ANXIOUS: 0
NAUSEA: 0
HEADACHES: 0
ARTHRALGIAS: 0
HEARTBURN: 0
SORE THROAT: 0
JOINT SWELLING: 0
HEMATOCHEZIA: 0
FEVER: 0
PARESTHESIAS: 0
COUGH: 0
MYALGIAS: 0
ABDOMINAL PAIN: 0
DIZZINESS: 0
SHORTNESS OF BREATH: 0
WEAKNESS: 0
CONSTIPATION: 0
HEMATURIA: 0
DIARRHEA: 0
EYE PAIN: 0
DYSURIA: 0
CHILLS: 0
FREQUENCY: 0

## 2023-11-10 ASSESSMENT — PATIENT HEALTH QUESTIONNAIRE - PHQ9
10. IF YOU CHECKED OFF ANY PROBLEMS, HOW DIFFICULT HAVE THESE PROBLEMS MADE IT FOR YOU TO DO YOUR WORK, TAKE CARE OF THINGS AT HOME, OR GET ALONG WITH OTHER PEOPLE: NOT DIFFICULT AT ALL
SUM OF ALL RESPONSES TO PHQ QUESTIONS 1-9: 3
SUM OF ALL RESPONSES TO PHQ QUESTIONS 1-9: 3

## 2023-11-10 NOTE — PROGRESS NOTES
SUBJECTIVE:   CC: Roberto is an 43 year old who presents for preventative health visit.       11/10/2023     3:21 PM   Additional Questions   Roomed by Char Bourgeois       Healthy Habits:     Getting at least 3 servings of Calcium per day:  Yes    Bi-annual eye exam:  NO    Dental care twice a year:  Yes    Sleep apnea or symptoms of sleep apnea:  None    Diet:  Regular (no restrictions)    Frequency of exercise:  None    Taking medications regularly:  Yes    Medication side effects:  Not applicable    Additional concerns today:  Yes      Today's PHQ-9 Score:       11/10/2023    12:44 PM   PHQ-9 SCORE   PHQ-9 Total Score MyChart 3 (Minimal depression)   PHQ-9 Total Score 3                 -------------------------------------      Social History     Tobacco Use    Smoking status: Some Days     Packs/day: .5     Types: Cigarettes     Last attempt to quit: 1/1/2013     Years since quitting: 10.8    Smokeless tobacco: Never   Substance Use Topics    Alcohol use: Yes             11/10/2023    12:54 PM   Alcohol Use   Prescreen: >3 drinks/day or >7 drinks/week? No       Last PSA:   PSA   Date Value Ref Range Status   07/03/2021 0.89 0 - 4 ug/L Final     Comment:     Assay Method:  Chemiluminescence using Siemens Vista analyzer     Prostate Specific Antigen Screen   Date Value Ref Range Status   10/28/2023 0.71 0.00 - 2.50 ng/mL Final       Reviewed orders with patient. Reviewed health maintenance and updated orders accordingly - Yes  Lab work is in process  Labs reviewed in EPIC  BP Readings from Last 3 Encounters:   11/10/23 138/82   10/06/22 124/84   04/07/21 132/80    Wt Readings from Last 3 Encounters:   11/10/23 105.7 kg (233 lb)   10/06/22 104.8 kg (231 lb)   04/07/21 108 kg (238 lb)                  Patient Active Problem List   Diagnosis    Social phobia    Sleep disorder    CARDIOVASCULAR SCREENING; LDL GOAL LESS THAN 160    ADD (attention deficit hyperactivity disorder, inattentive type)    History of cold  sores    Mild recurrent major depression (H24)    Primary hypogonadism in male     Past Surgical History:   Procedure Laterality Date    TONSILLECTOMY  1990       Social History     Tobacco Use    Smoking status: Some Days     Packs/day: .5     Types: Cigarettes     Last attempt to quit: 1/1/2013     Years since quitting: 10.8    Smokeless tobacco: Never   Substance Use Topics    Alcohol use: Yes     Family History   Problem Relation Age of Onset    Depression Sister     Depression Mother     Diabetes Mother         borderline    Hypertension Mother     Prostate Cancer Father 60    Lipids Father     Psychotic Disorder Maternal Uncle         suicide    Cancer - colorectal No family hx of          Current Outpatient Medications   Medication Sig Dispense Refill    amLODIPine (NORVASC) 10 MG tablet Take 1 tablet (10 mg) by mouth daily 90 tablet 2    amphetamine-dextroamphetamine (ADDERALL XR) 20 MG 24 hr capsule Take 1 capsule (20 mg) by mouth daily 30 capsule 0    fluticasone (FLONASE) 50 MCG/ACT nasal spray Use 2 spray(s) in each nostril once daily 16 g 5    LORazepam (ATIVAN) 0.5 MG tablet Take ONE to TWO tablets by mouth EVERY 6 HOURS, AS NEEDED FOR ANXIETY. 20 tablet 0    losartan-hydrochlorothiazide (HYZAAR) 50-12.5 MG tablet Take 1 tablet by mouth daily 90 tablet 1    omeprazole (PRILOSEC) 40 MG DR capsule Take 1 capsule (40 mg) by mouth daily 90 capsule 1    sildenafil (VIAGRA) 100 MG tablet Take 0.5-1 tablets ( mg) by mouth daily as needed (for erectile dysfunction) 10 tablet 11    testosterone cypionate (DEPOTESTOSTERONE) 200 MG/ML injection INJECT 1 ML INTO THE MUSCLE EVERY 28 DAYSINJECT 1 ML INTO THE MUSCLE EVERY 28 DAYS 1 mL 0    traZODone (DESYREL) 100 MG tablet Take 1.5 tablets (150 mg) by mouth at bedtime Take 1 and one-half tablets (150 mg) by mouth At Bedtime Strength: 100 mg 135 tablet 1    valACYclovir (VALTREX) 1000 mg tablet Take 2 Tablets by mouth 2 times daily for 1 day for cold sore  "outbreak. 4 tablets for treatment. 28 tablet 2    venlafaxine (EFFEXOR XR) 75 MG 24 hr capsule Take 3 capsules (225 mg) by mouth daily 270 capsule 3    venlafaxine (EFFEXOR) 37.5 MG tablet One tablet to be taken if extended release Effexor is missed (to help alleviate side effects) 60 tablet 0     No Known Allergies    Reviewed and updated as needed this visit by clinical staff   Tobacco  Allergies  Meds              Reviewed and updated as needed this visit by Provider                 Past Medical History:   Diagnosis Date    Genital warts 8/12/2010    Lipoma     right leg, excised    Moderate recurrent major depression (H) 7/22/2009    Dx age  21    Social phobia 7/22/2009      Past Surgical History:   Procedure Laterality Date    TONSILLECTOMY  1990       Review of Systems   Constitutional:  Negative for chills and fever.   HENT:  Negative for congestion, ear pain, hearing loss and sore throat.    Eyes:  Negative for pain and visual disturbance.   Respiratory:  Negative for cough and shortness of breath.    Cardiovascular:  Negative for chest pain, palpitations and peripheral edema.   Gastrointestinal:  Negative for abdominal pain, constipation, diarrhea, heartburn, hematochezia and nausea.   Genitourinary:  Negative for dysuria, frequency, genital sores, hematuria, impotence, penile discharge and urgency.   Musculoskeletal:  Negative for arthralgias, joint swelling and myalgias.   Skin:  Negative for rash.   Neurological:  Negative for dizziness, weakness, headaches and paresthesias.   Psychiatric/Behavioral:  Negative for mood changes. The patient is not nervous/anxious.          OBJECTIVE:   /82   Pulse 100   Temp 98.3  F (36.8  C) (Temporal)   Resp 16   Ht 1.892 m (6' 2.5\")   Wt 105.7 kg (233 lb)   SpO2 97%   BMI 29.52 kg/m      Physical Exam  GENERAL: healthy, alert and no distress  EYES: Eyes grossly normal to inspection, PERRL and conjunctivae and sclerae normal  HENT: ear canals and TM's " "normal, nose and mouth without ulcers or lesions  NECK: no adenopathy, no asymmetry, masses, or scars and thyroid normal to palpation  RESP: lungs clear to auscultation - no rales, rhonchi or wheezes  CV: regular rate and rhythm, normal S1 S2, no S3 or S4, no murmur, click or rub, no peripheral edema and peripheral pulses strong  ABDOMEN: soft, nontender, no hepatosplenomegaly, no masses and bowel sounds normal  MS: no gross musculoskeletal defects noted, no edema  SKIN: no suspicious lesions or rashes  NEURO: Normal strength and tone, mentation intact and speech normal  PSYCH: mentation appears normal, affect normal/bright    Diagnostic Test Results:  Labs reviewed in Epic  No results found for this or any previous visit (from the past 24 hour(s)).    ASSESSMENT/PLAN:       ICD-10-CM    1. Routine general medical examination at a health care facility  Z00.00       2. Primary hypogonadism in male  E29.1       3. Essential hypertension  I10       4. Mild recurrent major depression (H24)  F33.0       5. Social phobia  F40.10       6. Sleep disorder  G47.9       7. Need for hepatitis C screening test  Z11.59           Patient has been advised of split billing requirements and indicates understanding: Yes      COUNSELING:   Reviewed preventive health counseling, as reflected in patient instructions       Regular exercise       Healthy diet/nutrition       Vision screening       Hearing screening      BMI:   Estimated body mass index is 29.52 kg/m  as calculated from the following:    Height as of this encounter: 1.892 m (6' 2.5\").    Weight as of this encounter: 105.7 kg (233 lb).   Weight management plan: Discussed healthy diet and exercise guidelines      He reports that he has been smoking. He has been smoking an average of .5 packs per day. He has never used smokeless tobacco.  Nicotine/Tobacco Cessation Plan:   Information offered: Patient not interested at this time      I have reviewed the patient's vaccination " schedule and discussed the benefits of prophylactic vaccination in detail.  I recommend the patient contact their pharmacist for vaccinations.  Discussed that most insurance companies now favor reimbursement to the pharmacies and it will financially behoove the patient to have vaccinations performed at their pharmacy.            Jose Duarte [facility]

## 2023-11-13 PROBLEM — E29.1 PRIMARY HYPOGONADISM IN MALE: Status: ACTIVE | Noted: 2023-11-13

## 2023-11-30 ENCOUNTER — MYC REFILL (OUTPATIENT)
Dept: INTERNAL MEDICINE | Facility: CLINIC | Age: 43
End: 2023-11-30
Payer: COMMERCIAL

## 2023-11-30 DIAGNOSIS — R09.A2 GLOBUS SENSATION: ICD-10-CM

## 2023-11-30 DIAGNOSIS — E29.1 PRIMARY MALE HYPOGONADISM: ICD-10-CM

## 2023-11-30 RX ORDER — OMEPRAZOLE 40 MG/1
40 CAPSULE, DELAYED RELEASE ORAL DAILY
Qty: 90 CAPSULE | Refills: 3 | Status: SHIPPED | OUTPATIENT
Start: 2023-11-30

## 2023-12-01 RX ORDER — TESTOSTERONE CYPIONATE 200 MG/ML
INJECTION, SOLUTION INTRAMUSCULAR
Qty: 1 ML | Refills: 0 | Status: SHIPPED | OUTPATIENT
Start: 2023-12-01 | End: 2024-01-02

## 2023-12-08 ENCOUNTER — MYC REFILL (OUTPATIENT)
Dept: INTERNAL MEDICINE | Facility: CLINIC | Age: 43
End: 2023-12-08
Payer: COMMERCIAL

## 2023-12-08 DIAGNOSIS — F40.10 SOCIAL PHOBIA: ICD-10-CM

## 2023-12-08 DIAGNOSIS — G47.9 SLEEP DISORDER: ICD-10-CM

## 2023-12-08 RX ORDER — TRAZODONE HYDROCHLORIDE 100 MG/1
150 TABLET ORAL AT BEDTIME
Qty: 135 TABLET | Refills: 1 | Status: SHIPPED | OUTPATIENT
Start: 2023-12-08 | End: 2024-01-12

## 2023-12-20 ENCOUNTER — MYC MEDICAL ADVICE (OUTPATIENT)
Dept: INTERNAL MEDICINE | Facility: CLINIC | Age: 43
End: 2023-12-20
Payer: COMMERCIAL

## 2023-12-20 DIAGNOSIS — J01.00 ACUTE NON-RECURRENT MAXILLARY SINUSITIS: Primary | ICD-10-CM

## 2023-12-21 RX ORDER — AZITHROMYCIN 250 MG/1
TABLET, FILM COATED ORAL
Qty: 6 TABLET | Refills: 0 | Status: SHIPPED | OUTPATIENT
Start: 2023-12-21 | End: 2023-12-26

## 2023-12-29 DIAGNOSIS — E29.1 PRIMARY MALE HYPOGONADISM: ICD-10-CM

## 2024-01-02 RX ORDER — TESTOSTERONE CYPIONATE 200 MG/ML
INJECTION, SOLUTION INTRAMUSCULAR
Qty: 1 ML | Refills: 3 | Status: SHIPPED | OUTPATIENT
Start: 2024-01-02 | End: 2024-04-22

## 2024-01-03 ENCOUNTER — MYC REFILL (OUTPATIENT)
Dept: FAMILY MEDICINE | Facility: CLINIC | Age: 44
End: 2024-01-03
Payer: COMMERCIAL

## 2024-01-03 DIAGNOSIS — F98.8 ATTENTION DEFICIT DISORDER, UNSPECIFIED HYPERACTIVITY PRESENCE: ICD-10-CM

## 2024-01-03 RX ORDER — DEXTROAMPHETAMINE SACCHARATE, AMPHETAMINE ASPARTATE MONOHYDRATE, DEXTROAMPHETAMINE SULFATE AND AMPHETAMINE SULFATE 5; 5; 5; 5 MG/1; MG/1; MG/1; MG/1
20 CAPSULE, EXTENDED RELEASE ORAL DAILY
Qty: 30 CAPSULE | Refills: 0 | Status: SHIPPED | OUTPATIENT
Start: 2024-01-03 | End: 2024-04-03

## 2024-01-12 DIAGNOSIS — G47.9 SLEEP DISORDER: ICD-10-CM

## 2024-01-12 DIAGNOSIS — F40.10 SOCIAL PHOBIA: ICD-10-CM

## 2024-01-12 RX ORDER — TRAZODONE HYDROCHLORIDE 100 MG/1
300 TABLET ORAL AT BEDTIME
Qty: 360 TABLET | Refills: 3 | Status: SHIPPED | OUTPATIENT
Start: 2024-01-12

## 2024-01-12 NOTE — TELEPHONE ENCOUNTER
Wife calling, C2C on file, requesting script update for trazodone.     Patient is now taking 3 tablets at bedtime and an additional tablet in the night if needed.     Please review updated sig and sign if appropriate.     RAFAEL CliffordN, RN

## 2024-01-22 DIAGNOSIS — F40.10 SOCIAL PHOBIA: ICD-10-CM

## 2024-01-22 RX ORDER — LORAZEPAM 0.5 MG/1
TABLET ORAL
Qty: 90 TABLET | Refills: 0 | Status: SHIPPED | OUTPATIENT
Start: 2024-01-22 | End: 2024-07-01

## 2024-02-22 ENCOUNTER — OFFICE VISIT (OUTPATIENT)
Dept: INTERNAL MEDICINE | Facility: CLINIC | Age: 44
End: 2024-02-22
Payer: COMMERCIAL

## 2024-02-22 VITALS
OXYGEN SATURATION: 95 % | SYSTOLIC BLOOD PRESSURE: 136 MMHG | DIASTOLIC BLOOD PRESSURE: 82 MMHG | HEIGHT: 75 IN | BODY MASS INDEX: 29.14 KG/M2 | TEMPERATURE: 97.8 F | RESPIRATION RATE: 20 BRPM | HEART RATE: 113 BPM | WEIGHT: 234.4 LBS

## 2024-02-22 DIAGNOSIS — F33.0 MILD RECURRENT MAJOR DEPRESSION (H): ICD-10-CM

## 2024-02-22 DIAGNOSIS — J40 BRONCHITIS: Primary | ICD-10-CM

## 2024-02-22 PROCEDURE — 99213 OFFICE O/P EST LOW 20 MIN: CPT | Performed by: INTERNAL MEDICINE

## 2024-02-22 RX ORDER — ALBUTEROL SULFATE 90 UG/1
2 AEROSOL, METERED RESPIRATORY (INHALATION) EVERY 6 HOURS PRN
Qty: 18 G | Refills: 0 | Status: SHIPPED | OUTPATIENT
Start: 2024-02-22

## 2024-02-22 RX ORDER — LEVOFLOXACIN 500 MG/1
500 TABLET, FILM COATED ORAL DAILY
Qty: 10 TABLET | Refills: 0 | Status: SHIPPED | OUTPATIENT
Start: 2024-02-22

## 2024-02-22 ASSESSMENT — PAIN SCALES - GENERAL: PAINLEVEL: NO PAIN (0)

## 2024-02-22 NOTE — PROGRESS NOTES
Henrietta Mejia is a 44 year old, presenting for the following health issues:  Chronic Cough      2/22/2024     3:24 PM   Additional Questions   Roomed by Justa SHELTON     History of Present Illness       Reason for visit:  Cough  Symptom onset:  1-2 weeks ago  Symptoms include:  Cough  Symptom intensity:  Moderate  Symptom progression:  Staying the same  Had these symptoms before:  No    He eats 2-3 servings of fruits and vegetables daily.He consumes 0 sweetened beverage(s) daily.He exercises with enough effort to increase his heart rate 10 to 19 minutes per day.  He exercises with enough effort to increase his heart rate 3 or less days per week.   He is taking medications regularly.                EMR reviewed including:             Complaint, History of Chief Complaint, Corresponding Review of Systems, and Complaint Specific Physical Examination.    #1   Persistent cough.  Productive of green-yellow sputum.  Low-grade fever, present for the last 2 weeks off and on.  Currently other sick family members.  Has checked for COVID several times with all being negative.  Difficulty sleeping due to cough.  Taking food and fluid adequately.          Exam:   LUNGS: Coarse rhonchi are heard bilaterally, airflow is symmetric, no intercostal retraction or stridor is noted.  Frequent productive coughing is noted during visit.   HEART:  regular without rubs, clicks, gallops, or murmurs. PMI is nondisplaced. Upstrokes are brisk. S1,S2 are heard.   GI: Abdomen is soft, without rebound, guarding or tenderness. Bowel sounds are appropriate. No renal bruits are heard.   ENT: Pharynx is mildly-erythemous, moderate yellow PND, there is moderate nasal congestion, TM's not red or retracted, hearing intact bilaterally. No carotid bruits are heard. No JVD seen. Thyroid is not nodular or enlarged.        Patient has been interviewed, applicable history and applied review of systems have been performed.    Vital Signs:   /82    "Pulse 113   Temp 97.8  F (36.6  C) (Temporal)   Resp 20   Ht 1.906 m (6' 3.04\")   Wt 106.3 kg (234 lb 6.4 oz)   SpO2 95%   BMI 29.27 kg/m        Decision Making    Problem and Complexity     1. Bronchitis  Started on albuterol Levaquin.  If symptoms do not improve, recommend chest x-ray that we are treating aggressively at this time for bacterial bronchitis.    - albuterol (PROAIR HFA/PROVENTIL HFA/VENTOLIN HFA) 108 (90 Base) MCG/ACT inhaler; Inhale 2 puffs into the lungs every 6 hours as needed for shortness of breath, wheezing or cough  Dispense: 18 g; Refill: 0  - levofloxacin (LEVAQUIN) 500 MG tablet; Take 1 tablet (500 mg) by mouth daily  Dispense: 10 tablet; Refill: 0    2. Mild recurrent major depression (H24)  Continue current medications                                FOLLOW UP   I have asked the patient to make an appointment for followup with me virtually in 1 to 2 weeks    Regarding routine vaccinations:  I have reviewed the patient's vaccination schedule and discussed the benefits of prophylactic vaccination in detail.  I recommend the patient contact their pharmacist for vaccinations.  Discussed that most insurance companies now favor reimbursement to the pharmacies and it will financially behoove the patient to have vaccinations performed at their pharmacy.        I have carefully explained the diagnosis and treatment options to the patient.  The patient has displayed an understanding of the above, and all subsequent questions were answered.      DO RENO Andre    Portions of this note were produced using [employer]  Although every attempt at real-time proof reading has been made, occasional grammar/syntax errors may have been missed.    "

## 2024-03-07 ENCOUNTER — MYC MEDICAL ADVICE (OUTPATIENT)
Dept: INTERNAL MEDICINE | Facility: CLINIC | Age: 44
End: 2024-03-07
Payer: COMMERCIAL

## 2024-03-07 DIAGNOSIS — N52.03 COMBINED ARTERIAL INSUFFICIENCY AND CORPORO-VENOUS OCCLUSIVE ERECTILE DYSFUNCTION: Primary | ICD-10-CM

## 2024-03-07 RX ORDER — TADALAFIL 20 MG/1
20 TABLET ORAL DAILY PRN
Qty: 30 TABLET | Refills: 0 | Status: SHIPPED | OUTPATIENT
Start: 2024-03-07 | End: 2024-06-14

## 2024-03-07 NOTE — TELEPHONE ENCOUNTER
Last visit was 02-.  Patient is requesting medication change.     Veronica Cloud RN on 3/7/2024 at 8:55 AM

## 2024-03-08 ENCOUNTER — TELEPHONE (OUTPATIENT)
Dept: INTERNAL MEDICINE | Facility: CLINIC | Age: 44
End: 2024-03-08
Payer: COMMERCIAL

## 2024-03-08 NOTE — TELEPHONE ENCOUNTER
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 PA:  Previously Tried and Failed:  na  Rationale:  na    Insurance   Primary: [facility]   Insurance ID:  [id number]     Secondary (if applicable):na  Insurance ID:  armani    Pharmacy Information (if different than what is on RX)  Name:  [facility]  Phone:  [phone]  Fax:[phone]

## 2024-03-20 DIAGNOSIS — N52.2 DRUG-INDUCED ERECTILE DYSFUNCTION: ICD-10-CM

## 2024-03-20 RX ORDER — SILDENAFIL 100 MG/1
TABLET, FILM COATED ORAL
Qty: 10 TABLET | Refills: 11 | Status: SHIPPED | OUTPATIENT
Start: 2024-03-20

## 2024-03-20 NOTE — TELEPHONE ENCOUNTER
Central Prior Authorization Team   Phone: [phone]    PA Initiation    Medication: tadalafil (ADCIRCA/CIALIS) 20 MG tablet  Insurance Company: [employer] Non-Specialty PA's - Phone [phone] Fax [phone]  Pharmacy Filling the Rx: [facility] - [address]  Filling Pharmacy Phone: [phone]  Filling Pharmacy Fax:    Start Date: 3/20/2024

## 2024-03-20 NOTE — TELEPHONE ENCOUNTER
"Per pharmacy they need a new Rx, for some reason the tadalafil Rx sent over on 3/7/2024 \"\"    Pharmacy electronically sent a new Rx request to the clinic.  Once a new Rx is received they will fill this for the patient.   "

## 2024-03-20 NOTE — TELEPHONE ENCOUNTER
Prior Authorization Approval    Authorization Effective Date: 2/19/2024  Authorization Expiration Date: 3/20/2025  Medication: tadalafil (ADCIRCA/CIALIS) 20 MG tablet  Approved Dose/Quantity:   Reference #:     Insurance Company: [employer] Non-Specialty PA's - Phone [phone] Fax [phone]  Expected CoPay:       CoPay Card Available:      Foundation Assistance Needed:    Which Pharmacy is filling the prescription (Not needed for infusion/clinic administered): [facility] [place], [place] - [address] [place] [address]  Pharmacy Notified:  yes  Patient Notified:  yes- Pharmacy will contact patient when ready to /ship

## 2024-03-26 ENCOUNTER — TELEPHONE (OUTPATIENT)
Dept: INTERNAL MEDICINE | Facility: CLINIC | Age: 44
End: 2024-03-26
Payer: COMMERCIAL

## 2024-03-26 ENCOUNTER — MYC MEDICAL ADVICE (OUTPATIENT)
Dept: INTERNAL MEDICINE | Facility: CLINIC | Age: 44
End: 2024-03-26
Payer: COMMERCIAL

## 2024-03-26 DIAGNOSIS — J01.00 ACUTE NON-RECURRENT MAXILLARY SINUSITIS: Primary | ICD-10-CM

## 2024-03-26 RX ORDER — AZITHROMYCIN 250 MG/1
TABLET, FILM COATED ORAL
Qty: 6 TABLET | Refills: 0 | Status: SHIPPED | OUTPATIENT
Start: 2024-03-26 | End: 2024-03-31

## 2024-03-26 NOTE — TELEPHONE ENCOUNTER
RN TRIAGE CALL:    Patient Contact    Attempt # 1    Was call answered?  No.  Left message on voicemail with information to call me back. Also sent FireIDhart response.       Roberto Vaz (supporting Jose Duarte DO)1 minute ago (11:42 AM)     I ve had nasty sinus discharge for more than two weeks and isn t improving .. prob getting worse. Wondering if I can get an antibiotic to help resolve, sent to [facility]

## 2024-03-26 NOTE — TELEPHONE ENCOUNTER
Attempted to call patient. See telephone encounter 3/26/24.    Also sent Lamsahart response.     RAFAEL TopeteN, RN

## 2024-03-27 NOTE — TELEPHONE ENCOUNTER
This was addressed by Dr. Duarte, he sent medication to [facility] in [place] yesterday 3/26/24. See MyChart encounter 3/26/27.    RAFAEL TopeteN, RN

## 2024-04-03 ENCOUNTER — MYC REFILL (OUTPATIENT)
Dept: FAMILY MEDICINE | Facility: CLINIC | Age: 44
End: 2024-04-03
Payer: COMMERCIAL

## 2024-04-03 DIAGNOSIS — F98.8 ATTENTION DEFICIT DISORDER, UNSPECIFIED HYPERACTIVITY PRESENCE: ICD-10-CM

## 2024-04-04 RX ORDER — DEXTROAMPHETAMINE SACCHARATE, AMPHETAMINE ASPARTATE MONOHYDRATE, DEXTROAMPHETAMINE SULFATE AND AMPHETAMINE SULFATE 5; 5; 5; 5 MG/1; MG/1; MG/1; MG/1
20 CAPSULE, EXTENDED RELEASE ORAL DAILY
Qty: 30 CAPSULE | Refills: 0 | Status: SHIPPED | OUTPATIENT
Start: 2024-04-04 | End: 2024-07-01

## 2024-04-22 ENCOUNTER — MYC REFILL (OUTPATIENT)
Dept: INTERNAL MEDICINE | Facility: CLINIC | Age: 44
End: 2024-04-22
Payer: COMMERCIAL

## 2024-04-22 DIAGNOSIS — E29.1 PRIMARY MALE HYPOGONADISM: ICD-10-CM

## 2024-04-23 RX ORDER — TESTOSTERONE CYPIONATE 200 MG/ML
INJECTION, SOLUTION INTRAMUSCULAR
Qty: 1 ML | Refills: 3 | Status: SHIPPED | OUTPATIENT
Start: 2024-04-23 | End: 2024-08-08

## 2024-05-22 DIAGNOSIS — J30.2 SEASONAL ALLERGIC RHINITIS, UNSPECIFIED TRIGGER: ICD-10-CM

## 2024-05-22 RX ORDER — FLUTICASONE PROPIONATE 50 MCG
SPRAY, SUSPENSION (ML) NASAL
Qty: 16 G | Refills: 0 | Status: SHIPPED | OUTPATIENT
Start: 2024-05-22 | End: 2024-09-05

## 2024-06-14 DIAGNOSIS — N52.03 COMBINED ARTERIAL INSUFFICIENCY AND CORPORO-VENOUS OCCLUSIVE ERECTILE DYSFUNCTION: ICD-10-CM

## 2024-06-14 RX ORDER — TADALAFIL 20 MG/1
TABLET ORAL
Qty: 30 TABLET | Refills: 1 | Status: SHIPPED | OUTPATIENT
Start: 2024-06-14

## 2024-07-01 ENCOUNTER — MYC REFILL (OUTPATIENT)
Dept: FAMILY MEDICINE | Facility: CLINIC | Age: 44
End: 2024-07-01
Payer: COMMERCIAL

## 2024-07-01 ENCOUNTER — MYC REFILL (OUTPATIENT)
Dept: INTERNAL MEDICINE | Facility: CLINIC | Age: 44
End: 2024-07-01
Payer: COMMERCIAL

## 2024-07-01 DIAGNOSIS — B00.1 COLD SORE: ICD-10-CM

## 2024-07-01 DIAGNOSIS — I10 ESSENTIAL HYPERTENSION: ICD-10-CM

## 2024-07-01 DIAGNOSIS — F40.10 SOCIAL PHOBIA: ICD-10-CM

## 2024-07-01 DIAGNOSIS — F98.8 ATTENTION DEFICIT DISORDER, UNSPECIFIED HYPERACTIVITY PRESENCE: ICD-10-CM

## 2024-07-01 DIAGNOSIS — F33.0 MILD RECURRENT MAJOR DEPRESSION (H): ICD-10-CM

## 2024-07-01 RX ORDER — DEXTROAMPHETAMINE SACCHARATE, AMPHETAMINE ASPARTATE MONOHYDRATE, DEXTROAMPHETAMINE SULFATE AND AMPHETAMINE SULFATE 5; 5; 5; 5 MG/1; MG/1; MG/1; MG/1
20 CAPSULE, EXTENDED RELEASE ORAL DAILY
Qty: 30 CAPSULE | Refills: 0 | Status: SHIPPED | OUTPATIENT
Start: 2024-07-01 | End: 2024-09-30

## 2024-07-01 RX ORDER — VENLAFAXINE HYDROCHLORIDE 75 MG/1
225 CAPSULE, EXTENDED RELEASE ORAL DAILY
Qty: 270 CAPSULE | Refills: 0 | Status: SHIPPED | OUTPATIENT
Start: 2024-07-01 | End: 2024-10-01

## 2024-07-01 RX ORDER — LOSARTAN POTASSIUM AND HYDROCHLOROTHIAZIDE 12.5; 5 MG/1; MG/1
1 TABLET ORAL DAILY
Qty: 90 TABLET | Refills: 1 | Status: SHIPPED | OUTPATIENT
Start: 2024-07-01

## 2024-07-01 RX ORDER — VALACYCLOVIR HYDROCHLORIDE 1 G/1
TABLET, FILM COATED ORAL
Qty: 28 TABLET | Refills: 0 | Status: SHIPPED | OUTPATIENT
Start: 2024-07-01

## 2024-07-01 RX ORDER — LORAZEPAM 0.5 MG/1
TABLET ORAL
Qty: 30 TABLET | Refills: 0 | Status: SHIPPED | OUTPATIENT
Start: 2024-07-01

## 2024-08-05 DIAGNOSIS — I10 ESSENTIAL HYPERTENSION: ICD-10-CM

## 2024-08-05 RX ORDER — AMLODIPINE BESYLATE 10 MG/1
10 TABLET ORAL DAILY
Qty: 90 TABLET | Refills: 1 | Status: SHIPPED | OUTPATIENT
Start: 2024-08-05

## 2024-08-08 ENCOUNTER — MYC REFILL (OUTPATIENT)
Dept: INTERNAL MEDICINE | Facility: CLINIC | Age: 44
End: 2024-08-08
Payer: COMMERCIAL

## 2024-08-08 DIAGNOSIS — E29.1 PRIMARY MALE HYPOGONADISM: ICD-10-CM

## 2024-08-08 RX ORDER — TESTOSTERONE CYPIONATE 200 MG/ML
INJECTION, SOLUTION INTRAMUSCULAR
Qty: 1 ML | Refills: 3 | Status: SHIPPED | OUTPATIENT
Start: 2024-08-08

## 2024-09-05 DIAGNOSIS — J30.2 SEASONAL ALLERGIC RHINITIS, UNSPECIFIED TRIGGER: ICD-10-CM

## 2024-09-05 RX ORDER — FLUTICASONE PROPIONATE 50 MCG
SPRAY, SUSPENSION (ML) NASAL
Qty: 16 G | Refills: 0 | Status: SHIPPED | OUTPATIENT
Start: 2024-09-05

## 2024-09-30 ENCOUNTER — MYC REFILL (OUTPATIENT)
Dept: FAMILY MEDICINE | Facility: CLINIC | Age: 44
End: 2024-09-30
Payer: COMMERCIAL

## 2024-09-30 DIAGNOSIS — F98.8 ATTENTION DEFICIT DISORDER, UNSPECIFIED HYPERACTIVITY PRESENCE: ICD-10-CM

## 2024-09-30 RX ORDER — DEXTROAMPHETAMINE SACCHARATE, AMPHETAMINE ASPARTATE MONOHYDRATE, DEXTROAMPHETAMINE SULFATE AND AMPHETAMINE SULFATE 5; 5; 5; 5 MG/1; MG/1; MG/1; MG/1
20 CAPSULE, EXTENDED RELEASE ORAL DAILY
Qty: 30 CAPSULE | Refills: 0 | Status: SHIPPED | OUTPATIENT
Start: 2024-09-30

## 2024-10-01 ENCOUNTER — MYC REFILL (OUTPATIENT)
Dept: INTERNAL MEDICINE | Facility: CLINIC | Age: 44
End: 2024-10-01
Payer: COMMERCIAL

## 2024-10-01 DIAGNOSIS — F40.10 SOCIAL PHOBIA: ICD-10-CM

## 2024-10-01 DIAGNOSIS — F33.0 MILD RECURRENT MAJOR DEPRESSION (H): ICD-10-CM

## 2024-10-01 RX ORDER — VENLAFAXINE HYDROCHLORIDE 75 MG/1
225 CAPSULE, EXTENDED RELEASE ORAL DAILY
Qty: 270 CAPSULE | Refills: 0 | Status: SHIPPED | OUTPATIENT
Start: 2024-10-01

## 2024-10-11 ENCOUNTER — PATIENT OUTREACH (OUTPATIENT)
Dept: CARE COORDINATION | Facility: CLINIC | Age: 44
End: 2024-10-11
Payer: COMMERCIAL

## 2024-10-25 ENCOUNTER — PATIENT OUTREACH (OUTPATIENT)
Dept: CARE COORDINATION | Facility: CLINIC | Age: 44
End: 2024-10-25
Payer: COMMERCIAL

## 2024-11-04 DIAGNOSIS — F40.10 SOCIAL PHOBIA: ICD-10-CM

## 2024-11-04 NOTE — TELEPHONE ENCOUNTER
Refill requested for #90 of Ativan as this is usually what PCP gives patient.     Medication and pharmacy pended.     RAFAEL CliffordN, RN

## 2024-11-05 RX ORDER — LORAZEPAM 0.5 MG/1
TABLET ORAL
Qty: 90 TABLET | Refills: 0 | Status: SHIPPED | OUTPATIENT
Start: 2024-11-05

## 2024-11-06 NOTE — TELEPHONE ENCOUNTER
[facility]    PATIENT'S NAME: DIEGO CASTRO   ATTENDING PHYSICIAN: Chelly Dangelo MD   PATIENT ACCOUNT#:   [id number]    LOCATION:  [address] [place] [address]  MEDICAL RECORD #:   [id number]       YOB: 1922  ADMISSION DATE:       11/05/2024    HISTORY AND PHYSICAL EXAMINATION    CHIEF COMPLAINT:  Severe enteritis.    HISTORY OF PRESENT ILLNESS:  Patient is a 89-year-old  female who was seen in the emergency room on October 31 for similar complaint of abdominal pain, loose bowel movements.  CT scan of the abdomen at that time showed moderate to severe infectious inflammatory enterocolitis, with small amount of ascites, no free intraperitoneal air or well-defined drainable intra-abdominal collection.  She declined admission at that time.  Discharged home on Augmentin.  Today came back with recurrent symptoms associated with nausea and vomiting.  No diarrhea.  CBC today showed white blood cell count of 12.41 with left shift.  Chemistry and liver function were roughly unremarkable.  GFR is 58 which is slightly below her baseline.  Patient will be admitted to the hospital for further management.     PAST MEDICAL HISTORY:  Actinic keratosis, osteoarthritis, osteoporosis, gastroesophageal reflux disease, hypothyroidism, anxiety.    PAST SURGICAL HISTORY:  Multiple squamous cell carcinoma skin lesion resection, tonsillectomy.    MEDICATIONS:  Please see medication reconciliation list.    ALLERGIES:  No known drug allergies.    FAMILY HISTORY:  Mother had colon cancer.    SOCIAL HISTORY:  No tobacco, alcohol, or drug use.  Lives with a caregiver.  Usually requires some assistance in her basic activities of daily living.    REVIEW OF SYSTEMS:  Patient reports intractable abdominal pain for the last 3 to 4 days associated with nausea, vomiting, intolerance to oral intake.  Initially she had loose bowel movements, now she is constipated.  She had small bowel movement today, earlier, described by her  Prescription approved per [facility] Refill Protocol.  Megha Browning RN on 7/3/2023 at 1:58 PM     caregiver as loose.  No recent antibiotic exposure prior to symptoms.  No sick contacts.  Other 12-point review of systems is negative.      PHYSICAL EXAMINATION:    GENERAL:  Alert and oriented to time, place, and person.  Moderate distress.  VITAL SIGNS:  Temperature 98.4, pulse 95, respiratory rate 24, blood pressure 116/68, pulse ox 90% on room air.  HEENT:  Atraumatic.  Oropharynx clear.  Dry mucous membranes.  Ears, Nose:  Normal.  Eyes:  Anicteric sclerae.  NECK:  Supple.  No lymphadenopathy.  Trachea midline.  LUNGS:  Clear to auscultation bilaterally.  Normal respiratory effort.  HEART:  Regular rate and rhythm.  S1 and S2 auscultated.  No murmur.  ABDOMEN:   Soft, slightly distended.  Discomfort to palpation throughout the abdomen.  No point tenderness.  EXTREMITIES:  No peripheral edema, clubbing, or cyanosis.  NEUROLOGIC:  Motor and sensory intact.    ASSESSMENT AND PLAN:  Severe enterocolitis, could be inflammatory infectious versus less likely ischemic.  Patient will be admitted to general medical floor.  N.p.o. except sips of clear liquids.  IV Zosyn.  IV fluids.  Gastroenterology consult.  Monitor hemodynamic status and temperature curve.  Pain control.  If she develops diarrhea, we will obtain GI PCR panel and C difficile study.  Further recommendations to follow.    Dictated By Chelly Dangelo MD  d: 11/05/2024 19:41:12  t: 11/05/2024 20:29:21  Job [id number]  FB/

## 2024-11-26 ENCOUNTER — MYC REFILL (OUTPATIENT)
Dept: INTERNAL MEDICINE | Facility: CLINIC | Age: 44
End: 2024-11-26
Payer: COMMERCIAL

## 2024-11-26 DIAGNOSIS — E29.1 PRIMARY MALE HYPOGONADISM: ICD-10-CM

## 2024-11-26 RX ORDER — TESTOSTERONE CYPIONATE 200 MG/ML
INJECTION, SOLUTION INTRAMUSCULAR
Qty: 1 ML | Refills: 0 | Status: SHIPPED | OUTPATIENT
Start: 2024-11-26

## 2024-11-26 NOTE — TELEPHONE ENCOUNTER
The patient will need to set up a face-to-face appointment with me prior to any subsequent refills.    Please help the patient set up an appointment.    Thank you.  Felicia

## 2024-12-21 ENCOUNTER — HEALTH MAINTENANCE LETTER (OUTPATIENT)
Age: 44
End: 2024-12-21

## 2024-12-23 ENCOUNTER — MYC REFILL (OUTPATIENT)
Dept: INTERNAL MEDICINE | Facility: CLINIC | Age: 44
End: 2024-12-23
Payer: COMMERCIAL

## 2024-12-23 DIAGNOSIS — E29.1 PRIMARY MALE HYPOGONADISM: ICD-10-CM

## 2024-12-23 DIAGNOSIS — R09.A2 GLOBUS SENSATION: ICD-10-CM

## 2024-12-24 RX ORDER — OMEPRAZOLE 40 MG/1
40 CAPSULE, DELAYED RELEASE ORAL DAILY
Qty: 30 CAPSULE | Refills: 0 | Status: SHIPPED | OUTPATIENT
Start: 2024-12-24

## 2024-12-26 ENCOUNTER — MYC REFILL (OUTPATIENT)
Dept: INTERNAL MEDICINE | Facility: CLINIC | Age: 44
End: 2024-12-26
Payer: COMMERCIAL

## 2024-12-26 DIAGNOSIS — F33.0 MILD RECURRENT MAJOR DEPRESSION (H): ICD-10-CM

## 2024-12-26 DIAGNOSIS — F40.10 SOCIAL PHOBIA: ICD-10-CM

## 2024-12-26 RX ORDER — VENLAFAXINE HYDROCHLORIDE 75 MG/1
225 CAPSULE, EXTENDED RELEASE ORAL DAILY
Qty: 270 CAPSULE | Refills: 0 | Status: SHIPPED | OUTPATIENT
Start: 2024-12-26

## 2024-12-26 RX ORDER — TESTOSTERONE CYPIONATE 200 MG/ML
INJECTION, SOLUTION INTRAMUSCULAR
Qty: 1 ML | Refills: 0 | Status: SHIPPED | OUTPATIENT
Start: 2024-12-26

## 2025-01-08 DIAGNOSIS — F40.10 SOCIAL PHOBIA: ICD-10-CM

## 2025-01-08 DIAGNOSIS — G47.9 SLEEP DISORDER: ICD-10-CM

## 2025-01-08 NOTE — TELEPHONE ENCOUNTER
"Routing refill request to provider for review/approval because:    Requested Prescriptions   Pending Prescriptions Disp Refills    valACYclovir (VALTREX) 1000 mg tablet [Pharmacy Med Name: valACYclovir 1 gram tablet (VALTREX)] 28 tablet 2     Sig: Take 2 Tablets by mouth 2 times daily for 1 day for cold sore outbreak. 4 tablets for treatment.       Antivirals for Herpes Protocol Failed - 1/21/2023 10:03 AM        Failed - Recent (12 mo) or future (30 days) visit within the authorizing provider's specialty     Patient has had an office visit with the authorizing provider or a provider within the authorizing providers department within the previous 12 mos or has a future within next 30 days. See \"Patient Info\" tab in inbasket, or \"Choose Columns\" in Meds & Orders section of the refill encounter.              Failed - Normal serum creatinine on file in past 12 months     Recent Labs   Lab Test 03/10/21  1615   CR 0.74       Ok to refill medication if creatinine is low       " Detail Level: Detailed Quality 226: Preventive Care And Screening: Tobacco Use: Screening And Cessation Intervention: Patient screened for tobacco use and is an ex/non-smoker

## 2025-01-09 ENCOUNTER — MYC REFILL (OUTPATIENT)
Dept: INTERNAL MEDICINE | Facility: CLINIC | Age: 45
End: 2025-01-09
Payer: COMMERCIAL

## 2025-01-09 DIAGNOSIS — F40.10 SOCIAL PHOBIA: ICD-10-CM

## 2025-01-09 DIAGNOSIS — G47.9 SLEEP DISORDER: ICD-10-CM

## 2025-01-09 RX ORDER — TRAZODONE HYDROCHLORIDE 100 MG/1
300 TABLET ORAL AT BEDTIME
Qty: 360 TABLET | Refills: 3 | OUTPATIENT
Start: 2025-01-09

## 2025-01-09 RX ORDER — TRAZODONE HYDROCHLORIDE 100 MG/1
300 TABLET ORAL AT BEDTIME
Qty: 360 TABLET | Refills: 3 | Status: SHIPPED | OUTPATIENT
Start: 2025-01-09

## 2025-01-14 ASSESSMENT — PATIENT HEALTH QUESTIONNAIRE - PHQ9
SUM OF ALL RESPONSES TO PHQ QUESTIONS 1-9: 9
SUM OF ALL RESPONSES TO PHQ QUESTIONS 1-9: 9
10. IF YOU CHECKED OFF ANY PROBLEMS, HOW DIFFICULT HAVE THESE PROBLEMS MADE IT FOR YOU TO DO YOUR WORK, TAKE CARE OF THINGS AT HOME, OR GET ALONG WITH OTHER PEOPLE: VERY DIFFICULT

## 2025-01-15 ENCOUNTER — OFFICE VISIT (OUTPATIENT)
Dept: INTERNAL MEDICINE | Facility: CLINIC | Age: 45
End: 2025-01-15
Payer: COMMERCIAL

## 2025-01-15 VITALS
BODY MASS INDEX: 30.18 KG/M2 | WEIGHT: 242.7 LBS | RESPIRATION RATE: 20 BRPM | HEART RATE: 103 BPM | DIASTOLIC BLOOD PRESSURE: 78 MMHG | HEIGHT: 75 IN | TEMPERATURE: 98.5 F | OXYGEN SATURATION: 98 % | SYSTOLIC BLOOD PRESSURE: 130 MMHG

## 2025-01-15 DIAGNOSIS — Z00.00 ROUTINE GENERAL MEDICAL EXAMINATION AT A HEALTH CARE FACILITY: Primary | ICD-10-CM

## 2025-01-15 DIAGNOSIS — N52.2 DRUG-INDUCED ERECTILE DYSFUNCTION: ICD-10-CM

## 2025-01-15 DIAGNOSIS — E29.1 PRIMARY MALE HYPOGONADISM: ICD-10-CM

## 2025-01-15 DIAGNOSIS — G47.9 SLEEP DISORDER: ICD-10-CM

## 2025-01-15 DIAGNOSIS — F40.10 SOCIAL PHOBIA: ICD-10-CM

## 2025-01-15 DIAGNOSIS — I10 ESSENTIAL HYPERTENSION: ICD-10-CM

## 2025-01-15 DIAGNOSIS — F90.0 ATTENTION DEFICIT HYPERACTIVITY DISORDER (ADHD), PREDOMINANTLY INATTENTIVE TYPE: ICD-10-CM

## 2025-01-15 DIAGNOSIS — F33.1 MODERATE EPISODE OF RECURRENT MAJOR DEPRESSIVE DISORDER (H): ICD-10-CM

## 2025-01-15 DIAGNOSIS — Z11.59 NEED FOR HEPATITIS C SCREENING TEST: ICD-10-CM

## 2025-01-15 PROCEDURE — 99396 PREV VISIT EST AGE 40-64: CPT | Performed by: INTERNAL MEDICINE

## 2025-01-15 RX ORDER — TESTOSTERONE CYPIONATE 200 MG/ML
INJECTION, SOLUTION INTRAMUSCULAR
Qty: 1 ML | Refills: 3 | Status: SHIPPED | OUTPATIENT
Start: 2025-01-15

## 2025-01-15 SDOH — HEALTH STABILITY: PHYSICAL HEALTH: ON AVERAGE, HOW MANY MINUTES DO YOU ENGAGE IN EXERCISE AT THIS LEVEL?: 30 MIN

## 2025-01-15 SDOH — HEALTH STABILITY: PHYSICAL HEALTH: ON AVERAGE, HOW MANY DAYS PER WEEK DO YOU ENGAGE IN MODERATE TO STRENUOUS EXERCISE (LIKE A BRISK WALK)?: 3 DAYS

## 2025-01-15 ASSESSMENT — PAIN SCALES - GENERAL: PAINLEVEL_OUTOF10: NO PAIN (0)

## 2025-01-15 ASSESSMENT — SOCIAL DETERMINANTS OF HEALTH (SDOH): HOW OFTEN DO YOU GET TOGETHER WITH FRIENDS OR RELATIVES?: ONCE A WEEK

## 2025-01-15 NOTE — PROGRESS NOTES
"Preventive Care Visit  [facility]  Jose Shine Duarte DO, Internal Medicine  Misha 15, 2025      Assessment & Plan     Routine general medical examination at a health care facility      Moderate episode of recurrent major depressive disorder (H)  Will rule out medical reasons for increased depression.  - TSH; Future  - Comprehensive metabolic panel (BMP + Alb, Alk Phos, ALT, AST, Total. Bili, TP); Future  - Adult Mental Health  Referral; Future    Attention deficit hyperactivity disorder (ADHD), predominantly inattentive type  Given the complexity of his multiple medical/mental concerns, would have requests psychiatric evaluation for med recommendations  - Adult Mental Health  Referral; Future    Sleep disorder  As above      Social phobia  As above      Essential hypertension  Currently controlled.  Recommend checking renal function and electrolytes  - Comprehensive metabolic panel (BMP + Alb, Alk Phos, ALT, AST, Total. Bili, TP); Future    Primary male hypogonadism  Check testosterone peak and trough levels as well as hemoglobin and PSA  - testosterone cypionate (DEPOTESTOSTERONE) 200 MG/ML injection; INJECT .5 ML INTRAMUSCULARLY EVERY 14 DAYS    Drug-induced erectile dysfunction      Need for hepatitis C screening test    - Hepatitis C Screen Reflex to HCV RNA Quant and Genotype; Future    Patient has been advised of split billing requirements and indicates understanding: Yes        Nicotine/Tobacco Cessation  He reports that he has been smoking cigarettes. He has never used smokeless tobacco.  Nicotine/Tobacco Cessation Plan  Information offered: Patient not interested at this time      BMI  Estimated body mass index is 30.02 kg/m  as calculated from the following:    Height as of this encounter: 1.915 m (6' 3.39\").    Weight as of this encounter: 110.1 kg (242 lb 11.2 oz).   Weight management plan: Discussed healthy diet and exercise " guidelines    Depression Screening Follow Up        1/14/2025     4:33 PM   PHQ   PHQ-9 Total Score 9    Q9: Thoughts of better off dead/self-harm past 2 weeks Several days   F/U: Thoughts of suicide or self-harm No   F/U: Safety concerns No       Patient-reported         1/14/2025     4:33 PM   Last PHQ-9   1.  Little interest or pleasure in doing things 1   2.  Feeling down, depressed, or hopeless 1   3.  Trouble falling or staying asleep, or sleeping too much 3   4.  Feeling tired or having little energy 1   5.  Poor appetite or overeating 1   6.  Feeling bad about yourself 1   7.  Trouble concentrating 0   8.  Moving slowly or restless 0   Q9: Thoughts of better off dead/self-harm past 2 weeks 1   PHQ-9 Total Score 9    In the past two weeks have you had thoughts of suicide or self harm? No   Do you have concerns about your personal safety or the safety of others? No       Patient-reported                No data to display                      Follow Up Actions Taken  Crisis resource information provided in the After Visit Summary  Consult with [facility], monitored patient safety and notified provider.    Discussed the following ways the patient can remain in a safe environment:  remove alcohol, remove drugs, secure medications:  , dispose of old medications , remove access to firearms:  , and be around others  Counseling  Appropriate preventive services were addressed with this patient via screening, questionnaire, or discussion as appropriate for fall prevention, nutrition, physical activity, Tobacco-use cessation, social engagement, weight loss and cognition.  Checklist reviewing preventive services available has been given to the patient.  Reviewed patient's diet, addressing concerns and/or questions.   He is at risk for lack of exercise and has been provided with information to increase physical activity for the benefit of his well-being.   He is at risk for psychosocial distress and has been provided with  information to reduce risk.   The patient's PHQ-9 score is consistent with mild depression. He was provided with information regarding depression.       MEDICATIONS:  Continue current medications without change  Regular exercise    Henrietta Mejia is a 45 year old, presenting for the following:  Physical        1/15/2025     3:17 PM   Additional Questions   Roomed by Marilu SCHAFFER          Health Care Directive  Patient does not have a Health Care Directive: Discussed advance care planning with patient; however, patient declined at this time.      1/15/2025   General Health   How would you rate your overall physical health? (!) FAIR   Feel stress (tense, anxious, or unable to sleep) Rather much   (!) STRESS CONCERN      1/15/2025   Nutrition   Three or more servings of calcium each day? Yes   Diet: Regular (no restrictions)   How many servings of fruit and vegetables per day? (!) 2-3   How many sweetened beverages each day? 0-1         1/15/2025   Exercise   Days per week of moderate/strenous exercise 3 days   Average minutes spent exercising at this level 30 min         1/15/2025   Social Factors   Frequency of gathering with friends or relatives Once a week   Worry food won't last until get money to buy more No   Food not last or not have enough money for food? No   Do you have housing? (Housing is defined as stable permanent housing and does not include staying ouside in a car, in a tent, in an abandoned building, in an overnight shelter, or couch-surfing.) Yes   Are you worried about losing your housing? No   Lack of transportation? No   Unable to get utilities (heat,electricity)? No         1/15/2025   Dental   Dentist two times every year? Yes         1/15/2025   TB Screening   Were you born outside of the [place]? No       Today's PHQ-9 Score:       1/14/2025     4:33 PM   PHQ-9 SCORE   PHQ-9 Total Score MyChart 9 (Mild depression)   PHQ-9 Total Score 9        Patient-reported         1/15/2025    Substance Use   Alcohol more than 3/day or more than 7/wk No   Do you use any other substances recreationally? No     Social History     Tobacco Use    Smoking status: Some Days     Current packs/day: 0.00     Types: Cigarettes     Last attempt to quit: 2013     Years since quittin.0    Smokeless tobacco: Never   Vaping Use    Vaping status: Never Used   Substance Use Topics    Alcohol use: Yes    Drug use: No           1/15/2025   STI Screening   New sexual partner(s) since last STI/HIV test? No   ASCVD Risk   The 10-year ASCVD risk score (Osiris GENAO, et al., 2019) is: 5.8%    Values used to calculate the score:      Age: 45 years      Sex: Male      Is Non- : No      Diabetic: No      Tobacco smoker: Yes      Systolic Blood Pressure: 130 mmHg      Is BP treated: Yes      HDL Cholesterol: 47 mg/dL      Total Cholesterol: 194 mg/dL        1/15/2025   Contraception/Family Planning   Questions about contraception or family planning No        Reviewed and updated as needed this visit by Provider                    Past Medical History:   Diagnosis Date    Genital warts 2010    Lipoma     right leg, excised    Moderate recurrent major depression (H) 2009    Dx age  21    Social phobia 2009     Past Surgical History:   Procedure Laterality Date    TONSILLECTOMY       Lab work is in process  Labs reviewed in EPIC  BP Readings from Last 3 Encounters:   01/15/25 130/78   24 136/82   11/10/23 138/82    Wt Readings from Last 3 Encounters:   01/15/25 110.1 kg (242 lb 11.2 oz)   24 106.3 kg (234 lb 6.4 oz)   11/10/23 105.7 kg (233 lb)                  Patient Active Problem List   Diagnosis    Social phobia    Sleep disorder    CARDIOVASCULAR SCREENING; LDL GOAL LESS THAN 160    ADD (attention deficit disorder)    History of cold sores    Mild recurrent major depression    Primary hypogonadism in male     Past Surgical History:   Procedure Laterality Date     TONSILLECTOMY         Social History     Tobacco Use    Smoking status: Some Days     Current packs/day: 0.00     Types: Cigarettes     Last attempt to quit: 2013     Years since quittin.0    Smokeless tobacco: Never   Substance Use Topics    Alcohol use: Yes     Family History   Problem Relation Age of Onset    Depression Sister     Depression Mother     Diabetes Mother         borderline    Hypertension Mother     Prostate Cancer Father 60    Lipids Father     Psychotic Disorder Maternal Uncle         suicide    Cancer - colorectal No family hx of          Current Outpatient Medications   Medication Sig Dispense Refill    amLODIPine (NORVASC) 10 MG tablet Take 1 tablet (10 mg) by mouth daily 90 tablet 1    amphetamine-dextroamphetamine (ADDERALL XR) 20 MG 24 hr capsule Take 1 capsule (20 mg) by mouth daily. 30 capsule 0    fluticasone (FLONASE) 50 MCG/ACT nasal spray Use 2 spray(s) in each nostril once daily 16 g 0    LORazepam (ATIVAN) 0.5 MG tablet Take ONE to TWO tablets by mouth EVERY 6 HOURS, AS NEEDED FOR ANXIETY. 90 tablet 0    losartan-hydrochlorothiazide (HYZAAR) 50-12.5 MG tablet Take 1 tablet by mouth daily. 90 tablet 1    omeprazole (PRILOSEC) 40 MG DR capsule Take 1 capsule by mouth once daily 30 capsule 0    sildenafil (VIAGRA) 100 MG tablet Take one-half to 1 tablet ( mg) by mouth daily as needed (for erectile dysfunction) 10 tablet 11    tadalafil (CIALIS) 20 MG tablet Take 1 tablet (20 mg) by mouth daily as needed (the need for effective erectile function) 30 tablet 1    testosterone cypionate (DEPOTESTOSTERONE) 200 MG/ML injection INJECT .5 ML INTRAMUSCULARLY EVERY 14 DAYS 1 mL 3    traZODone (DESYREL) 100 MG tablet Take 3 tablets (300 mg) by mouth at bedtime. May take an additional tablet in the night if needed. 360 tablet 3    valACYclovir (VALTREX) 1000 mg tablet Take 2 Tablets by mouth 2 times daily for 1 day for cold sore outbreak. 4 tablets for treatment. 28 tablet 0  "   venlafaxine (EFFEXOR XR) 75 MG 24 hr capsule Take 3 capsules (225 mg) by mouth daily. 270 capsule 0    venlafaxine (EFFEXOR) 37.5 MG tablet One tablet to be taken if extended release Effexor is missed (to help alleviate side effects) 60 tablet 0     No Known Allergies      Review of Systems  CONSTITUTIONAL: Patient notes increased fatigue.  Notes that he is eating adequately.  INTEGUMENTARY/SKIN: NEGATIVE for worrisome rashes, moles or lesions  EYES: NEGATIVE for vision changes or irritation  ENT/MOUTH: NEGATIVE for ear, mouth and throat problems  RESP: NEGATIVE for significant cough or SOB  BREAST: NEGATIVE for masses, tenderness or discharge  CV: NEGATIVE for chest pain, palpitations or peripheral edema  GI: NEGATIVE for nausea, abdominal pain, heartburn, or change in bowel habits  : NEGATIVE for frequency, dysuria, or hematuria  MUSCULOSKELETAL: NEGATIVE for significant arthralgias or myalgia  NEURO: NEGATIVE for weakness, dizziness or paresthesias  ENDOCRINE: NEGATIVE for temperature intolerance, skin/hair changes  HEME: NEGATIVE for bleeding problems  PSYCHIATRIC: Patient relates increased depression, decreased sleep, has had brief suicidal ideation but notes no intention at this time.  Has good family support.  Is on increased doses of antidepressants.  Notes decreased interest in his normal activities of enjoyment.  Increased frustration with coworkers etc.  Has concurrent history of ADHD as well as social phobia.   Objective    Exam  /78 (BP Location: Right arm, Patient Position: Sitting, Cuff Size: Adult Regular)   Pulse 103   Temp 98.5  F (36.9  C) (Temporal)   Resp 20   Ht 1.915 m (6' 3.39\")   Wt 110.1 kg (242 lb 11.2 oz)   SpO2 98%   BMI 30.02 kg/m     Estimated body mass index is 30.02 kg/m  as calculated from the following:    Height as of this encounter: 1.915 m (6' 3.39\").    Weight as of this encounter: 110.1 kg (242 lb 11.2 oz).    Physical Exam  GENERAL: alert and no " distress  EYES: Eyes grossly normal to inspection, PERRL and conjunctivae and sclerae normal  HENT: ear canals and TM's normal, nose and mouth without ulcers or lesions  NECK: no adenopathy, no asymmetry, masses, or scars  RESP: lungs clear to auscultation - no rales, rhonchi or wheezes  CV: regular rate and rhythm, normal S1 S2, no S3 or S4, no murmur, click or rub, no peripheral edema  ABDOMEN: soft, nontender, no hepatosplenomegaly, no masses and bowel sounds normal  MS: no gross musculoskeletal defects noted, no edema  SKIN: no suspicious lesions or rashes  NEURO: Normal strength and tone, mentation intact and speech normal  PSYCH: mentation appears normal, affect normal/bright, affect flat, fatigued, and appears mildly disheveled when compared to his usual state.  Prior to immunization administration, verified patients identity using patient s name and date of birth. Please see Immunization Activity for additional information.     Screening Questionnaire for Adult Immunization    Are you sick today?   No   Do you have allergies to medications, food, a vaccine component or latex?   No   Have you ever had a serious reaction after receiving a vaccination?   No   Do you have a long-term health problem with heart, lung, kidney, or metabolic disease (e.g., diabetes), asthma, a blood disorder, no spleen, complement component deficiency, a cochlear implant, or a spinal fluid leak?  Are you on long-term aspirin therapy?   No   Do you have cancer, leukemia, HIV/AIDS, or any other immune system problem?   No   Do you have a parent, brother, or sister with an immune system problem?   No   In the past 3 months, have you taken medications that affect  your immune system, such as prednisone, other steroids, or anticancer drugs; drugs for the treatment of rheumatoid arthritis, Crohn s disease, or psoriasis; or have you had radiation treatments?   No   Have you had a seizure, or a brain or other nervous system problem?   No    During the past year, have you received a transfusion of blood or blood    products, or been given immune (gamma) globulin or antiviral drug?   No   For women: Are you pregnant or is there a chance you could become       pregnant during the next month?   No   Have you received any vaccinations in the past 4 weeks?   No     Immunization questionnaire answers were all negative.      Patient instructed to remain in clinic for 15 minutes afterwards, and to report any adverse reactions.     Screening performed by Marilu Paz on 1/15/2025 at 3:22 PM.        I have reviewed the patient's vaccination schedule and discussed the benefits of prophylactic vaccination in detail.  I recommend the patient contact their pharmacist for vaccinations.  Discussed that most insurance companies now favor reimbursement to the pharmacies and it will financially behoove the patient to have vaccinations performed at their pharmacy.        Signed Electronically by: Jose Duarte,     Answers submitted by the patient for this visit:  Patient Health Questionnaire (Submitted on 1/14/2025)  If you checked off any problems, how difficult have these problems made it for you to do your work, take care of things at home, or get along with other people?: Very difficult  PHQ9 TOTAL SCORE: 9

## 2025-01-21 DIAGNOSIS — R09.A2 GLOBUS SENSATION: ICD-10-CM

## 2025-01-22 RX ORDER — OMEPRAZOLE 40 MG/1
40 CAPSULE, DELAYED RELEASE ORAL DAILY
Qty: 30 CAPSULE | Refills: 0 | Status: SHIPPED | OUTPATIENT
Start: 2025-01-22

## 2025-01-27 ENCOUNTER — MYC MEDICAL ADVICE (OUTPATIENT)
Dept: INTERNAL MEDICINE | Facility: CLINIC | Age: 45
End: 2025-01-27
Payer: COMMERCIAL

## 2025-01-27 ENCOUNTER — MYC REFILL (OUTPATIENT)
Dept: INTERNAL MEDICINE | Facility: CLINIC | Age: 45
End: 2025-01-27
Payer: COMMERCIAL

## 2025-01-27 DIAGNOSIS — E29.1 PRIMARY MALE HYPOGONADISM: Primary | ICD-10-CM

## 2025-01-27 DIAGNOSIS — I10 ESSENTIAL HYPERTENSION: ICD-10-CM

## 2025-01-27 RX ORDER — AMLODIPINE BESYLATE 10 MG/1
10 TABLET ORAL DAILY
Qty: 90 TABLET | Refills: 1 | Status: SHIPPED | OUTPATIENT
Start: 2025-01-27

## 2025-01-28 ENCOUNTER — LAB (OUTPATIENT)
Dept: LAB | Facility: CLINIC | Age: 45
End: 2025-01-28
Payer: COMMERCIAL

## 2025-01-28 DIAGNOSIS — E29.1 PRIMARY MALE HYPOGONADISM: ICD-10-CM

## 2025-01-28 PROCEDURE — 36415 COLL VENOUS BLD VENIPUNCTURE: CPT

## 2025-01-31 ENCOUNTER — MYC MEDICAL ADVICE (OUTPATIENT)
Dept: INTERNAL MEDICINE | Facility: CLINIC | Age: 45
End: 2025-01-31
Payer: COMMERCIAL

## 2025-01-31 DIAGNOSIS — E29.1 PRIMARY MALE HYPOGONADISM: ICD-10-CM

## 2025-02-03 RX ORDER — TESTOSTERONE CYPIONATE 200 MG/ML
INJECTION, SOLUTION INTRAMUSCULAR
Qty: 1 ML | Refills: 3 | Status: SHIPPED | OUTPATIENT
Start: 2025-02-03

## 2025-02-03 NOTE — TELEPHONE ENCOUNTER
Dr. Duarte, see patient MyChart requesting updated prescription. Pended to reflect change.     Pardeep Woodruff RN on 2/3/2025 at 9:23 AM

## 2025-02-19 DIAGNOSIS — R09.A2 GLOBUS SENSATION: ICD-10-CM

## 2025-02-19 RX ORDER — OMEPRAZOLE 40 MG/1
40 CAPSULE, DELAYED RELEASE ORAL DAILY
Qty: 30 CAPSULE | Refills: 0 | Status: SHIPPED | OUTPATIENT
Start: 2025-02-19

## 2025-03-08 ENCOUNTER — E-VISIT (OUTPATIENT)
Dept: URGENT CARE | Facility: CLINIC | Age: 45
End: 2025-03-08
Payer: COMMERCIAL

## 2025-03-08 DIAGNOSIS — J01.90 ACUTE SINUSITIS WITH SYMPTOMS > 10 DAYS: Primary | ICD-10-CM

## 2025-03-09 NOTE — PATIENT INSTRUCTIONS
Acute Sinusitis: Care Instructions  Overview     Acute sinusitis is an inflammation of the mucous membranes inside the nose and sinuses. Sinuses are the hollow spaces in your skull around the eyes and nose. Acute sinusitis often follows a cold. Acute sinusitis causes thick, discolored mucus that drains from the nose or down the back of the throat. It also can cause pain and pressure in your head and face along with a stuffy or blocked nose.  In most cases, sinusitis gets better on its own in 1 to 2 weeks. But some mild symptoms may last for several weeks. Sometimes antibiotics are needed if there is a bacterial infection.  Follow-up care is a key part of your treatment and safety. Be sure to make and go to all appointments, and call your doctor if you are having problems. It's also a good idea to know your test results and keep a list of the medicines you take.  How can you care for yourself at home?  Use saline (saltwater) nasal washes. This can help keep your nasal passages open and wash out mucus and allergens.  You can buy saline nose washes at a grocery store or drugstore. Follow the instructions on the package.  You can make your own at home. Add 1 teaspoon of non-iodized salt and 1 teaspoon of baking soda to 2 cups of distilled or boiled and cooled water. Fill a squeeze bottle or a nasal cleansing pot (such as a neti pot) with the nasal wash. Then put the tip into your nostril, and lean over the sink. With your mouth open, gently squirt the liquid. Repeat on the other side.  Try a decongestant nasal spray like oxymetazoline (Afrin). Do not use it for more than 3 days in a row. Using it for more than 3 days can make your congestion worse.  If needed, take an over-the-counter pain medicine, such as acetaminophen (Tylenol), ibuprofen (Advil, Motrin), or naproxen (Aleve). Read and follow all instructions on the label.  If the doctor prescribed antibiotics, take them as directed. Do not stop taking them just  "because you feel better. You need to take the full course of antibiotics.  Be careful when taking over-the-counter cold or flu medicines and Tylenol at the same time. Many of these medicines have acetaminophen, which is Tylenol. Read the labels to make sure that you are not taking more than the recommended dose. Too much acetaminophen (Tylenol) can be harmful.  Try a steroid nasal spray. It may help with your symptoms.  Breathe warm, moist air. You can use a steamy shower, a hot bath, or a sink filled with hot water. Avoid cold, dry air. Using a humidifier in your home may help. Follow the directions for cleaning the machine.  When should you call for help?   Call your doctor now or seek immediate medical care if:    You have new or worse swelling, redness, or pain in your face or around one or both of your eyes.     You have double vision or a change in your vision.     You have a high fever.     You have a severe headache and a stiff neck.     You have mental changes, such as feeling confused or much less alert.   Watch closely for changes in your health, and be sure to contact your doctor if:    You are not getting better as expected.   Where can you learn more?  Go to https://www.ibeatyou.net/patiented  Enter I933 in the search box to learn more about \"Acute Sinusitis: Care Instructions.\"  Current as of: September 27, 2023  Content Version: 14.3    2024 [employer].   Care instructions adapted under license by your healthcare professional. If you have questions about a medical condition or this instruction, always ask your healthcare professional. [employer] disclaims any warranty or liability for your use of this information.    You may want to try a nasal lavage (also known as nasal irrigation). You can find over-the-counter products, such as Neti-Pot, at retail locations or make your own at home. Instructions for homemade nasal lavage and more information on the process are available " online at http://www.aafp.org/[email or web address]/[email or web address].    Dear Roberto Guillen    After reviewing your responses, I've been able to diagnose you with Acute sinusitis with symptoms > 10 days.      Based on your responses and diagnosis, I have prescribed   Orders Placed This Encounter   Medications     amoxicillin-clavulanate (AUGMENTIN) 875-125 MG tablet     Sig: Take 1 tablet by mouth 2 times daily for 7 days.     Dispense:  14 tablet     Refill:  0      to treat your symptoms. I have sent this to your pharmacy.?     It is also important to stay well hydrated, get lots of rest and take over-the-counter decongestants,?tylenol?or ibuprofen if you?are able to?take those medications per your primary care provider to help relieve discomfort.?     It is important that you take?all of?your prescribed medication even if your symptoms are improving after a few doses.? Taking?all of?your medicine helps prevent the symptoms from returning.?     If your symptoms worsen, you develop severe headache, vomiting, high fever (>102), or are not improving in 7 days, please contact your primary care provider for an appointment or visit any of our convenient Walk-in Care or Urgent Care Centers to be seen which can be found on our website?here.?     Thanks again for choosing?us?as your health care partner,?   ?  Benita Guillen PA-C?   Thank you for choosing us for your care. I have placed an order for a prescription so that you can start treatment:  Orders Placed This Encounter   Medications     amoxicillin-clavulanate (AUGMENTIN) 875-125 MG tablet     Sig: Take 1 tablet by mouth 2 times daily for 7 days.     Dispense:  14 tablet     Refill:  0          View your full visit summary for details by clicking on the link below. Your pharmacist will able to address any questions you may have about the medication.     If you're not feeling better within 5-7 days, please schedule an appointment.  You can schedule an appointment right here in  [place], or call [phone]  If the visit is for the same symptoms as your eVisit, we'll refund the cost of your eVisit if seen within seven days.

## 2025-03-17 DIAGNOSIS — R09.A2 GLOBUS SENSATION: ICD-10-CM

## 2025-03-17 RX ORDER — OMEPRAZOLE 40 MG/1
40 CAPSULE, DELAYED RELEASE ORAL DAILY
Qty: 30 CAPSULE | Refills: 0 | Status: SHIPPED | OUTPATIENT
Start: 2025-03-17

## 2025-03-26 ENCOUNTER — MYC REFILL (OUTPATIENT)
Dept: INTERNAL MEDICINE | Facility: CLINIC | Age: 45
End: 2025-03-26
Payer: COMMERCIAL

## 2025-03-26 DIAGNOSIS — F40.10 SOCIAL PHOBIA: ICD-10-CM

## 2025-03-26 DIAGNOSIS — N52.03 COMBINED ARTERIAL INSUFFICIENCY AND CORPORO-VENOUS OCCLUSIVE ERECTILE DYSFUNCTION: ICD-10-CM

## 2025-03-27 DIAGNOSIS — F33.0 MILD RECURRENT MAJOR DEPRESSION: ICD-10-CM

## 2025-03-27 DIAGNOSIS — F40.10 SOCIAL PHOBIA: ICD-10-CM

## 2025-03-27 DIAGNOSIS — F90.9 ATTENTION DEFICIT HYPERACTIVITY DISORDER (ADHD), UNSPECIFIED ADHD TYPE: ICD-10-CM

## 2025-03-27 RX ORDER — DEXTROAMPHETAMINE SACCHARATE, AMPHETAMINE ASPARTATE MONOHYDRATE, DEXTROAMPHETAMINE SULFATE AND AMPHETAMINE SULFATE 5; 5; 5; 5 MG/1; MG/1; MG/1; MG/1
20 CAPSULE, EXTENDED RELEASE ORAL DAILY
Qty: 30 CAPSULE | Refills: 0 | Status: SHIPPED | OUTPATIENT
Start: 2025-03-27

## 2025-03-27 RX ORDER — LORAZEPAM 0.5 MG/1
TABLET ORAL
Qty: 90 TABLET | Refills: 0 | Status: SHIPPED | OUTPATIENT
Start: 2025-03-27

## 2025-03-27 RX ORDER — TADALAFIL 20 MG/1
20 TABLET ORAL DAILY PRN
Qty: 30 TABLET | Refills: 1 | Status: SHIPPED | OUTPATIENT
Start: 2025-03-27

## 2025-03-27 RX ORDER — VENLAFAXINE HYDROCHLORIDE 75 MG/1
225 CAPSULE, EXTENDED RELEASE ORAL DAILY
Qty: 270 CAPSULE | Refills: 0 | Status: SHIPPED | OUTPATIENT
Start: 2025-03-27

## 2025-04-14 DIAGNOSIS — R09.A2 GLOBUS SENSATION: ICD-10-CM

## 2025-04-14 RX ORDER — OMEPRAZOLE 40 MG/1
40 CAPSULE, DELAYED RELEASE ORAL DAILY
Qty: 30 CAPSULE | Refills: 0 | Status: SHIPPED | OUTPATIENT
Start: 2025-04-14

## 2025-04-27 ENCOUNTER — MYC REFILL (OUTPATIENT)
Dept: INTERNAL MEDICINE | Facility: CLINIC | Age: 45
End: 2025-04-27
Payer: COMMERCIAL

## 2025-04-27 DIAGNOSIS — E29.1 PRIMARY MALE HYPOGONADISM: ICD-10-CM

## 2025-04-30 RX ORDER — TESTOSTERONE CYPIONATE 200 MG/ML
INJECTION, SOLUTION INTRAMUSCULAR
Qty: 1 ML | Refills: 3 | Status: SHIPPED | OUTPATIENT
Start: 2025-04-30

## 2025-05-12 ENCOUNTER — MYC REFILL (OUTPATIENT)
Dept: INTERNAL MEDICINE | Facility: CLINIC | Age: 45
End: 2025-05-12
Payer: COMMERCIAL

## 2025-05-12 DIAGNOSIS — J30.2 SEASONAL ALLERGIC RHINITIS, UNSPECIFIED TRIGGER: ICD-10-CM

## 2025-05-13 RX ORDER — FLUTICASONE PROPIONATE 50 MCG
2 SPRAY, SUSPENSION (ML) NASAL DAILY
Qty: 16 G | Refills: 2 | Status: SHIPPED | OUTPATIENT
Start: 2025-05-13

## 2025-06-11 ENCOUNTER — MYC REFILL (OUTPATIENT)
Dept: INTERNAL MEDICINE | Facility: CLINIC | Age: 45
End: 2025-06-11
Payer: COMMERCIAL

## 2025-06-11 DIAGNOSIS — F90.9 ATTENTION DEFICIT HYPERACTIVITY DISORDER (ADHD), UNSPECIFIED ADHD TYPE: ICD-10-CM

## 2025-06-11 RX ORDER — DEXTROAMPHETAMINE SACCHARATE, AMPHETAMINE ASPARTATE MONOHYDRATE, DEXTROAMPHETAMINE SULFATE AND AMPHETAMINE SULFATE 5; 5; 5; 5 MG/1; MG/1; MG/1; MG/1
20 CAPSULE, EXTENDED RELEASE ORAL DAILY
Qty: 30 CAPSULE | Refills: 0 | Status: SHIPPED | OUTPATIENT
Start: 2025-06-11

## 2025-06-29 ENCOUNTER — E-VISIT (OUTPATIENT)
Dept: URGENT CARE | Facility: CLINIC | Age: 45
End: 2025-06-29
Payer: COMMERCIAL

## 2025-06-29 ENCOUNTER — LAB (OUTPATIENT)
Dept: LAB | Facility: CLINIC | Age: 45
End: 2025-06-29
Payer: COMMERCIAL

## 2025-06-29 DIAGNOSIS — R30.0 DYSURIA: ICD-10-CM

## 2025-06-29 DIAGNOSIS — N30.90 BLADDER INFECTION: Primary | ICD-10-CM

## 2025-06-29 DIAGNOSIS — R30.0 DYSURIA: Primary | ICD-10-CM

## 2025-06-29 LAB
ALBUMIN UR-MCNC: 30 MG/DL
APPEARANCE UR: ABNORMAL
BACTERIA #/AREA URNS HPF: ABNORMAL /HPF
BILIRUB UR QL STRIP: NEGATIVE
COLOR UR AUTO: YELLOW
GLUCOSE UR STRIP-MCNC: NEGATIVE MG/DL
HGB UR QL STRIP: ABNORMAL
KETONES UR STRIP-MCNC: ABNORMAL MG/DL
LEUKOCYTE ESTERASE UR QL STRIP: ABNORMAL
MUCOUS THREADS #/AREA URNS LPF: PRESENT /LPF
NITRATE UR QL: POSITIVE
PH UR STRIP: 6 [PH] (ref 5–7)
RBC URINE: 9 /HPF
SP GR UR STRIP: 1.03 (ref 1–1.03)
TRANSITIONAL EPI: 1 /HPF
UROBILINOGEN UR STRIP-MCNC: 4 MG/DL
WBC URINE: >182 /HPF

## 2025-06-29 PROCEDURE — 87491 CHLMYD TRACH DNA AMP PROBE: CPT

## 2025-06-29 PROCEDURE — 87591 N.GONORRHOEAE DNA AMP PROB: CPT

## 2025-06-29 PROCEDURE — 87186 SC STD MICRODIL/AGAR DIL: CPT

## 2025-06-29 PROCEDURE — 81001 URINALYSIS AUTO W/SCOPE: CPT

## 2025-06-29 PROCEDURE — 87086 URINE CULTURE/COLONY COUNT: CPT

## 2025-06-29 RX ORDER — SULFAMETHOXAZOLE AND TRIMETHOPRIM 800; 160 MG/1; MG/1
1 TABLET ORAL 2 TIMES DAILY
Qty: 14 TABLET | Refills: 0 | Status: SHIPPED | OUTPATIENT
Start: 2025-06-29 | End: 2025-07-06

## 2025-06-29 NOTE — PATIENT INSTRUCTIONS
Dear Roberto,     After reviewing your responses, I would like you to come in for a urine test to make sure we treat you correctly. This urine test is to evaluate you for a possible urinary tract infection, and should be completed today or tomorrow. Schedule a Lab Only appointment here.     If a Lab appointment is not available, please check this site for lab locations and hours (many labs are closed evenings and weekends). You may walk into any Lab location during their operating hours without an appointment to complete your urine test. Please let the lab staff know that you have had an eVisit and your provider has ordered a urine test.     You will receive instructions with your results in [facility] once they are available.     If your symptoms worsen, you develop pain in your back or stomach, develop fevers, or are not improving in 5 days, please contact your primary care provider for an appointment or visit a Walk-in or Urgent Care Center to be seen.     Thanks again for choosing us as your health care partner,     Deonna Toribio MD

## 2025-06-30 LAB
C TRACH DNA SPEC QL NAA+PROBE: NEGATIVE
N GONORRHOEA DNA SPEC QL NAA+PROBE: NEGATIVE
SPECIMEN TYPE: NORMAL
SPECIMEN TYPE: NORMAL

## 2025-07-01 LAB — BACTERIA UR CULT: ABNORMAL

## 2025-07-02 ENCOUNTER — MYC REFILL (OUTPATIENT)
Dept: INTERNAL MEDICINE | Facility: CLINIC | Age: 45
End: 2025-07-02
Payer: COMMERCIAL

## 2025-07-02 DIAGNOSIS — F33.0 MILD RECURRENT MAJOR DEPRESSION: ICD-10-CM

## 2025-07-02 DIAGNOSIS — I10 ESSENTIAL HYPERTENSION: ICD-10-CM

## 2025-07-02 DIAGNOSIS — G47.9 SLEEP DISORDER: ICD-10-CM

## 2025-07-02 DIAGNOSIS — N52.03 COMBINED ARTERIAL INSUFFICIENCY AND CORPORO-VENOUS OCCLUSIVE ERECTILE DYSFUNCTION: ICD-10-CM

## 2025-07-02 DIAGNOSIS — F40.10 SOCIAL PHOBIA: ICD-10-CM

## 2025-07-02 RX ORDER — VENLAFAXINE HYDROCHLORIDE 75 MG/1
225 CAPSULE, EXTENDED RELEASE ORAL DAILY
Qty: 270 CAPSULE | Refills: 0 | Status: SHIPPED | OUTPATIENT
Start: 2025-07-02

## 2025-07-02 RX ORDER — TADALAFIL 20 MG/1
20 TABLET ORAL DAILY PRN
Qty: 30 TABLET | Refills: 1 | Status: SHIPPED | OUTPATIENT
Start: 2025-07-02

## 2025-07-02 RX ORDER — TRAZODONE HYDROCHLORIDE 100 MG/1
300 TABLET ORAL AT BEDTIME
Qty: 360 TABLET | Refills: 1 | Status: SHIPPED | OUTPATIENT
Start: 2025-07-02

## 2025-07-02 RX ORDER — LOSARTAN POTASSIUM AND HYDROCHLOROTHIAZIDE 12.5; 5 MG/1; MG/1
1 TABLET ORAL DAILY
Qty: 90 TABLET | Refills: 1 | Status: SHIPPED | OUTPATIENT
Start: 2025-07-02

## 2025-07-07 ENCOUNTER — MYC REFILL (OUTPATIENT)
Dept: INTERNAL MEDICINE | Facility: CLINIC | Age: 45
End: 2025-07-07
Payer: COMMERCIAL

## 2025-07-07 DIAGNOSIS — R09.A2 GLOBUS SENSATION: ICD-10-CM

## 2025-07-07 DIAGNOSIS — I10 ESSENTIAL HYPERTENSION: ICD-10-CM

## 2025-07-07 RX ORDER — OMEPRAZOLE 40 MG/1
40 CAPSULE, DELAYED RELEASE ORAL DAILY
Qty: 30 CAPSULE | Refills: 0 | OUTPATIENT
Start: 2025-07-07

## 2025-07-07 RX ORDER — OMEPRAZOLE 40 MG/1
40 CAPSULE, DELAYED RELEASE ORAL DAILY
Qty: 30 CAPSULE | Refills: 0 | Status: SHIPPED | OUTPATIENT
Start: 2025-07-07

## 2025-07-07 RX ORDER — AMLODIPINE BESYLATE 10 MG/1
10 TABLET ORAL DAILY
Qty: 90 TABLET | Refills: 1 | Status: SHIPPED | OUTPATIENT
Start: 2025-07-07

## 2025-07-21 ENCOUNTER — MYC REFILL (OUTPATIENT)
Dept: INTERNAL MEDICINE | Facility: CLINIC | Age: 45
End: 2025-07-21
Payer: COMMERCIAL

## 2025-07-21 DIAGNOSIS — E29.1 PRIMARY MALE HYPOGONADISM: ICD-10-CM

## 2025-07-21 RX ORDER — TESTOSTERONE CYPIONATE 200 MG/ML
INJECTION, SOLUTION INTRAMUSCULAR
Qty: 1 ML | Refills: 3 | Status: SHIPPED | OUTPATIENT
Start: 2025-07-21

## 2025-07-28 ENCOUNTER — MYC REFILL (OUTPATIENT)
Dept: INTERNAL MEDICINE | Facility: CLINIC | Age: 45
End: 2025-07-28
Payer: COMMERCIAL

## 2025-07-28 DIAGNOSIS — F90.9 ATTENTION DEFICIT HYPERACTIVITY DISORDER (ADHD), UNSPECIFIED ADHD TYPE: ICD-10-CM

## 2025-07-28 RX ORDER — DEXTROAMPHETAMINE SACCHARATE, AMPHETAMINE ASPARTATE MONOHYDRATE, DEXTROAMPHETAMINE SULFATE AND AMPHETAMINE SULFATE 5; 5; 5; 5 MG/1; MG/1; MG/1; MG/1
20 CAPSULE, EXTENDED RELEASE ORAL DAILY
Qty: 30 CAPSULE | Refills: 0 | Status: SHIPPED | OUTPATIENT
Start: 2025-07-28

## 2025-08-05 ENCOUNTER — MYC REFILL (OUTPATIENT)
Dept: INTERNAL MEDICINE | Facility: CLINIC | Age: 45
End: 2025-08-05
Payer: COMMERCIAL

## 2025-08-05 DIAGNOSIS — R09.A2 GLOBUS SENSATION: ICD-10-CM

## 2025-08-06 RX ORDER — OMEPRAZOLE 40 MG/1
40 CAPSULE, DELAYED RELEASE ORAL DAILY
Qty: 30 CAPSULE | Refills: 0 | Status: SHIPPED | OUTPATIENT
Start: 2025-08-06

## 2025-08-28 ENCOUNTER — MYC REFILL (OUTPATIENT)
Dept: INTERNAL MEDICINE | Facility: CLINIC | Age: 45
End: 2025-08-28
Payer: COMMERCIAL

## 2025-08-28 DIAGNOSIS — F40.10 SOCIAL PHOBIA: ICD-10-CM

## 2025-08-28 RX ORDER — LORAZEPAM 0.5 MG/1
TABLET ORAL
Qty: 90 TABLET | Refills: 0 | Status: SHIPPED | OUTPATIENT
Start: 2025-08-28